# Patient Record
Sex: FEMALE | Race: WHITE | NOT HISPANIC OR LATINO | Employment: OTHER | URBAN - NONMETROPOLITAN AREA
[De-identification: names, ages, dates, MRNs, and addresses within clinical notes are randomized per-mention and may not be internally consistent; named-entity substitution may affect disease eponyms.]

---

## 2018-06-05 ENCOUNTER — OFFICE VISIT CONVERTED (OUTPATIENT)
Dept: FAMILY MEDICINE CLINIC | Age: 68
End: 2018-06-05
Attending: FAMILY MEDICINE

## 2018-12-04 ENCOUNTER — OFFICE VISIT CONVERTED (OUTPATIENT)
Dept: FAMILY MEDICINE CLINIC | Age: 68
End: 2018-12-04
Attending: FAMILY MEDICINE

## 2019-01-14 ENCOUNTER — HOSPITAL ENCOUNTER (OUTPATIENT)
Dept: OTHER | Facility: HOSPITAL | Age: 69
Discharge: HOME | End: 2019-01-14
Attending: FAMILY MEDICINE

## 2019-01-25 ENCOUNTER — OFFICE VISIT CONVERTED (OUTPATIENT)
Dept: NEUROSURGERY | Facility: CLINIC | Age: 69
End: 2019-01-25
Attending: NEUROLOGICAL SURGERY

## 2019-02-13 ENCOUNTER — OFFICE VISIT (OUTPATIENT)
Dept: PAIN MEDICINE | Facility: CLINIC | Age: 69
End: 2019-02-13

## 2019-02-13 VITALS
RESPIRATION RATE: 15 BRPM | HEART RATE: 83 BPM | BODY MASS INDEX: 32.2 KG/M2 | DIASTOLIC BLOOD PRESSURE: 73 MMHG | HEIGHT: 62 IN | TEMPERATURE: 98.1 F | WEIGHT: 175 LBS | OXYGEN SATURATION: 96 % | SYSTOLIC BLOOD PRESSURE: 120 MMHG

## 2019-02-13 DIAGNOSIS — G89.29 OTHER CHRONIC PAIN: Primary | ICD-10-CM

## 2019-02-13 DIAGNOSIS — M48.061 SPINAL STENOSIS OF LUMBAR REGION, UNSPECIFIED WHETHER NEUROGENIC CLAUDICATION PRESENT: ICD-10-CM

## 2019-02-13 DIAGNOSIS — M51.36 DDD (DEGENERATIVE DISC DISEASE), LUMBAR: ICD-10-CM

## 2019-02-13 DIAGNOSIS — M54.16 LUMBAR RADICULOPATHY: ICD-10-CM

## 2019-02-13 PROBLEM — M51.369 DDD (DEGENERATIVE DISC DISEASE), LUMBAR: Status: ACTIVE | Noted: 2019-02-13

## 2019-02-13 LAB
POC AMPHETAMINES: NEGATIVE
POC BARBITURATES: NEGATIVE
POC BENZODIAZEPHINES: NEGATIVE
POC COCAINE: NEGATIVE
POC METHADONE: NEGATIVE
POC METHAMPHETAMINE SCREEN URINE: NEGATIVE
POC OPIATES: NEGATIVE
POC OXYCODONE: NEGATIVE
POC PHENCYCLIDINE: NEGATIVE
POC PROPOXYPHENE: NEGATIVE
POC THC: NEGATIVE
POC TRICYCLIC ANTIDEPRESSANTS: NEGATIVE

## 2019-02-13 PROCEDURE — 80305 DRUG TEST PRSMV DIR OPT OBS: CPT | Performed by: NURSE PRACTITIONER

## 2019-02-13 PROCEDURE — 99204 OFFICE O/P NEW MOD 45 MIN: CPT | Performed by: NURSE PRACTITIONER

## 2019-02-13 RX ORDER — INSULIN ASPART 100 [IU]/ML
INJECTION, SOLUTION INTRAVENOUS; SUBCUTANEOUS
COMMUNITY
Start: 2018-11-09 | End: 2021-09-14

## 2019-02-13 RX ORDER — ASPIRIN 81 MG/1
81 TABLET, CHEWABLE ORAL DAILY
COMMUNITY

## 2019-02-13 RX ORDER — ATENOLOL AND CHLORTHALIDONE TABLET 50; 25 MG/1; MG/1
0.5 TABLET ORAL 2 TIMES DAILY
COMMUNITY
Start: 2019-01-04 | End: 2021-06-30

## 2019-02-13 RX ORDER — LEVOTHYROXINE SODIUM 0.1 MG/1
TABLET ORAL DAILY
COMMUNITY
Start: 2018-12-18 | End: 2021-06-14

## 2019-02-13 RX ORDER — LIRAGLUTIDE 6 MG/ML
1.2 INJECTION SUBCUTANEOUS DAILY
COMMUNITY
Start: 2019-01-22 | End: 2021-08-20

## 2019-02-13 RX ORDER — INSULIN GLARGINE 100 [IU]/ML
60 INJECTION, SOLUTION SUBCUTANEOUS DAILY
COMMUNITY
Start: 2018-11-19 | End: 2021-08-10

## 2019-02-13 RX ORDER — ATORVASTATIN CALCIUM 20 MG/1
TABLET, FILM COATED ORAL DAILY
COMMUNITY
Start: 2019-01-11 | End: 2021-06-28

## 2019-02-13 RX ORDER — LISINOPRIL 40 MG/1
TABLET ORAL DAILY
COMMUNITY
Start: 2018-12-18 | End: 2021-06-28

## 2019-02-13 NOTE — PROGRESS NOTES
"CHIEF COMPLAINT  New pt c/o hip and leg pain and numbness in right leg. She also has a burning sensation in that right leg and it does get weak after walking \"feels like it is not there.\" Pain is in both hips, worse in the right hip. Sitting down relieves the pain. States this pain has gotten worse in the last two months but has had it off and on for a year. Since mara night she has been sleeping with a pillow between her knees.  She has seen a neuosurgeon 3-4 weeks ago who suggested pain management and she doesn't want to go the route of surgery. States it was Dr. Hernandez from [facility]. She has a second visit scheduled. States she had epidural injections years ago 10-15 years ago. She had it done at [facility] in [facility]. It was through pain management. No reports of medicine or other treatments there. She had 6 injections which helped until 6 years ago. She has had cervical injections as well because of left arm getting numb and no issues since then. No use of OTC pain medicines, only rarely tylenol. No reports of narcotics to treat this. She has tried Gabapentin but stopped due to the side effects and not effective in helping reduce pain. MRI 1-22-19.     Subjective   Kelsy Rider is a  RHD 69 y.o. female.   She presents to the office for evaluation of hip and leg pain. She was referred here by Dr. Zuñiga.  She lives with her .  She is retired from working at a factory. Does not smoke and never has. Denies any alcohol use. Denies any substance use. Family history is positive for alcoholism(brother). Family history is negative for back and leg problems.    Complains of pain in her right buttock, right hip and right leg. Does have intermittent pain in the left buttock. Today her pain 7/10VAS. Describes her right leg is weak and has continuous burning. \"It feels like rubber and like it's not there.\" Fears of subjective weakness and falls. The pain radiates down her posterior and " "lateral right thigh. Pain increases with household chores, prolonged and walking and standing; pain decreases with laying, sitting, rest, pillow between knees for sleep. Used heat and ice with minimal temporary relief.  Takes Tylenol OTC occasionally with mild improvement. Was given a trial of gabapentin but stopped last week due to side effects(\"I felt like I was in a fog and stumbling over words-- which has resolved since stopping). Noted no relief except maybe made her sleep better. Denies any bowel or bladder incontinence. ADL's by self.    Her back pain started years ago. Had epidurals with Dr. Yeh over 10 years ago. Had ANÍBAL with positive response. Had instance of neck and LUE pain. Had BRITTANY with positive response. Had noticed intermittent instances of right hip and leg numbness over past 3 years. Would flare up and go away. However, since November 2018, has noticed her pain has become more constant, more severe and more debilitating. Saw Dr Hernandez (neurosurgery). Discussed potential for surgery. Decided to try pain management route. Followed up with Dr. Hernandez next week.     Her  is a patient of Dr. Selby and had lumbar RFL with significant relief.  No relief with PT and HEP.  Back Pain   This is a recurrent problem. The current episode started more than 1 year ago. The problem occurs constantly. The problem has been gradually worsening since onset. The pain is present in the gluteal. The quality of the pain is described as burning and shooting. Radiates to: RLE. The pain is at a severity of 7/10. The pain is moderate. The pain is the same all the time. The symptoms are aggravated by bending, position, standing and twisting. Stiffness is present all day. Associated symptoms include numbness and weakness. Pertinent negatives include no bladder incontinence, bowel incontinence, chest pain, fever or headaches. Risk factors include lack of exercise, menopause and sedentary lifestyle. She has tried " heat, ice, chiropractic manipulation, home exercises, bed rest, NSAIDs and muscle relaxant (gabapentin; previous lumbar epidurals provided significant long-term relief) for the symptoms. The treatment provided mild relief.      PEG Assessment   What number best describes your pain on average in the past week?9  What number best describes how, during the past week, pain has interfered with your enjoyment of life?8  What number best describes how, during the past week, pain has interfered with your general activity?  9      Current Outpatient Medications:   •  aspirin 81 MG chewable tablet, Chew 81 mg Daily., Disp: , Rfl:   •  atenolol-chlorthalidone (TENORETIC) 50-25 MG per tablet, 0.5 tablets 2 (Two) Times a Day., Disp: , Rfl:   •  atorvastatin (LIPITOR) 20 MG tablet, Daily., Disp: , Rfl:   •  LANTUS SOLOSTAR 100 UNIT/ML injection pen, 60 Units Daily., Disp: , Rfl:   •  levothyroxine (SYNTHROID, LEVOTHROID) 100 MCG tablet, Daily., Disp: , Rfl:   •  lisinopril (PRINIVIL,ZESTRIL) 40 MG tablet, Daily., Disp: , Rfl:   •  metFORMIN (GLUCOPHAGE) 850 MG tablet, 850 mg Daily., Disp: , Rfl:   •  NOVOLOG FLEXPEN 100 UNIT/ML solution pen-injector sc pen, 4units-12 units sliding scale, 6 units before meals, Disp: , Rfl:   •  VICTOZA 18 MG/3ML solution pen-injector injection, 1.2 Units Daily., Disp: , Rfl:     The following portions of the patient's history were reviewed and updated as appropriate: allergies, current medications, past family history, past medical history, past social history, past surgical history and problem list.      REVIEW OF PERTINENT MEDICAL DATA    BRITTANI [id number]-- Prescription for gabapentin 300 mg #90 from Dr. Amato on 1-8-19. Prescription for Hydrocodone 5/325 #20 from Petrona Hernandez on 5-14-18. One pharmacy and 2 providers.    PIÑA Inventory--2    Patient had 3 BRITTANY performed by Dr Steward on 10-26-17, 9-26-17.  Had lumbar epidural steroid injections with Dr. Yeh greater than 10 year  "ago.        Review of Systems   Constitutional: Positive for activity change. Negative for chills and fever.   Respiratory: Positive for apnea (states was on a machine for a long time but stopped using it due to having difficulty with the company.). Negative for cough, shortness of breath and wheezing.    Cardiovascular: Negative for chest pain.   Gastrointestinal: Positive for constipation. Negative for bowel incontinence.   Genitourinary: Negative for bladder incontinence and difficulty urinating.   Musculoskeletal: Positive for arthralgias and back pain.   Neurological: Positive for weakness and numbness. Negative for dizziness, light-headedness and headaches.   Psychiatric/Behavioral: Negative for agitation, confusion, hallucinations, sleep disturbance and suicidal ideas. The patient is not nervous/anxious.      Vitals:    02/13/19 1001   BP: 120/73   Pulse: 83   Resp: 15   Temp: 98.1 °F (36.7 °C)   SpO2: 96%   Weight: 79.4 kg (175 lb)   Height: 157.5 cm (62\")   PainSc:   7   PainLoc: Back  Comment: and leg. pt states when walking     Objective   Physical Exam   Constitutional: She is oriented to person, place, and time. Vital signs are normal. She appears well-developed and well-nourished. She is cooperative.   HENT:   Head: Normocephalic and atraumatic.   Nose: Nose normal.   Eyes: Conjunctivae, EOM and lids are normal. Pupils are equal, round, and reactive to light.   Neck: Trachea normal. Neck supple.   Cardiovascular: Normal rate, regular rhythm and normal heart sounds.   Pulmonary/Chest: Effort normal and breath sounds normal. No respiratory distress.   Abdominal: Normal appearance.   Musculoskeletal:        Lumbar back: She exhibits decreased range of motion and tenderness. She exhibits no bony tenderness and no spasm.   No si joint tenderness  No lumbar facet tenderness    Minimal right piriformis tenderness    + SLR on right, negative on left  Negative NATHAN bilaterally  + femoral stretch on right, " negative on left   Neurological: She is alert and oriented to person, place, and time. She has normal strength. Gait (RLE) abnormal.   Reflex Scores:       Patellar reflexes are 1+ on the right side and 2+ on the left side.       Achilles reflexes are 1+ on the right side and 1+ on the left side.  Skin: Skin is warm, dry and intact.   Psychiatric: She has a normal mood and affect. Her speech is normal and behavior is normal. Judgment and thought content normal. Cognition and memory are normal.   Nursing note and vitals reviewed.    Assessment/Plan   Kelsy was seen today for back pain and extremity pain.    Diagnoses and all orders for this visit:    Other chronic pain  -     Urine Drug Screen Confirmation - Urine, Clean Catch; Future  -     POC Urine Drug Screen, Triage    Spinal stenosis of lumbar region, unspecified whether neurogenic claudication present  -     Case Request  -     Urine Drug Screen Confirmation - Urine, Clean Catch; Future  -     POC Urine Drug Screen, Triage    DDD (degenerative disc disease), lumbar  -     Case Request  -     Urine Drug Screen Confirmation - Urine, Clean Catch; Future  -     POC Urine Drug Screen, Triage    Lumbar radiculopathy  -     Case Request  -     Urine Drug Screen Confirmation - Urine, Clean Catch; Future  -     POC Urine Drug Screen, Triage      --- Routine new patient initial UDS in office today.  The specimen was viewed and the immunoassay result reviewed and is NEGATIVE(APPROPRIATE).  This specimen will be sent to [facility] for confirmation.     -- TF ANÍBAL right L3 and L5 x2, 4 weeks apart. No blood thinners. Reviewed the procedure at length with the patient.  Included in the review was expectations, complications, risk and benefits.The procedure was described in detail and the risks, benefits and alternatives were discussed with the patient (including but not limited to: bleeding, infection, nerve damage, worsening of pain, inability to perform  injection, paralysis, seizures, and death) who agreed to proceed.  Discussed the potential for sedation if warranted/wanted.  The procedure will plan to be performed at [facility] with fluoroscopic guidance(unless ultrasound is indicated). Questions were answered and in a way the patient could understand.  Patient verbalized understanding and wishes to proceed.  This intervention will be ordered.  --- Follow-up after procedure or sooner if needed     BRITTANI REPORT      BRITTANI report has been reviewed and scanned into the patient's chart.    As the clinician, I personally reviewed the BRITTANI from 2-11-19 while the patient was in the office today.            EMR Dragon/Transcription disclaimer:   Much of this encounter note is an electronic transcription/translation of spoken language to printed text. The electronic translation of spoken language may permit erroneous, or at times, nonsensical words or phrases to be inadvertently transcribed; Although I have reviewed the note for such errors, some may still exist.

## 2019-02-18 ENCOUNTER — RESULTS ENCOUNTER (OUTPATIENT)
Dept: PAIN MEDICINE | Facility: CLINIC | Age: 69
End: 2019-02-18

## 2019-02-18 DIAGNOSIS — G89.29 OTHER CHRONIC PAIN: ICD-10-CM

## 2019-02-18 DIAGNOSIS — M51.36 DDD (DEGENERATIVE DISC DISEASE), LUMBAR: ICD-10-CM

## 2019-02-18 DIAGNOSIS — M48.061 SPINAL STENOSIS OF LUMBAR REGION, UNSPECIFIED WHETHER NEUROGENIC CLAUDICATION PRESENT: ICD-10-CM

## 2019-02-18 DIAGNOSIS — M54.16 LUMBAR RADICULOPATHY: ICD-10-CM

## 2019-02-27 ENCOUNTER — DOCUMENTATION (OUTPATIENT)
Dept: PAIN MEDICINE | Facility: CLINIC | Age: 69
End: 2019-02-27

## 2019-02-27 ENCOUNTER — OUTSIDE FACILITY SERVICE (OUTPATIENT)
Dept: PAIN MEDICINE | Facility: CLINIC | Age: 69
End: 2019-02-27

## 2019-02-27 PROCEDURE — 64484 NJX AA&/STRD TFRM EPI L/S EA: CPT | Performed by: PAIN MEDICINE

## 2019-02-27 PROCEDURE — 64483 NJX AA&/STRD TFRM EPI L/S 1: CPT | Performed by: PAIN MEDICINE

## 2019-02-27 NOTE — PROGRESS NOTES
Lumbar Transforaminal Epidural Steroid Injection (two levels)  [facility]      PREOPERATIVE DIAGNOSIS:  Chronic low back pain, Lumbar Degenerative Disc Disease and Lumbar Spinal Stenosis unspecified regarding Neurogenic Claudication    POSTOPERATIVE DIAGNOSIS:  Same as preop diagnosis    PROCEDURE:    1. CPT 65265 --  Diagnostic & Therapeutic Transforaminal Epidural Steroid Injection at the L3 level, on the right   2. CPT 20946 --  Diagnostic & Therapeutic Transforaminal Epidural Steroid Injection at the L5 level, on the right     PRE-PROCEDURE DISCUSSION WITH PATIENT:    Risks and complications were discussed with the patient prior to starting the procedure and informed consent was obtained.  We discussed various topics including but not limited to bleeding, infection, injury, nerve injury, paralysis, coma, death, postprocedural painful flare-up, postprocedural site soreness, and a lack of pain relief.  We discussed the diagnostic aspect of transforaminal epidural / selective nerve root blockade.    SURGEON:  Lady Selby MD    REASON FOR PROCEDURE:    Diagnostic injection at this level is needed, Degenerative changes are noted in the area. and Stenotic area is noted, and is likely contributing to this chronic &/or recurrent pain.     SEDATION:  Patient declined administration of moderate sedation    ANESTHETIC:  Marcaine 0.25%  STEROID:  Methylprednisolone (DEPO MEDROL) 80mg/ml    DESCRIPTON OF PROCEDURE:  After obtaining informed consent, an I.V. was not started in the preoperative area. The patient taken to the operating room and was placed in the prone position with a pillow under the abdomen.  All pressure points were well padded.  EKG, blood pressure, and pulse oximeter were monitored.  The lumbar area was prepped with Chloraprep and draped in a sterile fashion. Under fluoroscopic guidance in an oblique dimension on the above mentioned side, the transverse process of the first  aforementioned vertebra at the junction of the body at 6 o'clock position was identified. Skin and subcutaneous tissue was anesthetized with 1% lidocaine. A 22-gauge spinal needle was introduced under fluoroscopic guidance at the above junction into the foramen without parasthesias and into the epidural space. After confirming the position of the needle with PA, lateral, and oblique fluoroscopic views, aspiration was checked and was clear of blood or CSF.  Next, 1 mL of Omnipaque was injected. After seeing adequate spread on the corresponding nerve root, a total volume 2mL of injectate containing local anesthetic as above and half of the above mentioned corticosteroid was injected into the epidural space.  The needle was removed intact.      Next, in similar fashion, the second level mentioned above was addressed and a similar amount of injectate was delivered after similar imaging was achieved.      Vital signs were stable throughout.          ESTIMATED BLOOD LOSS:  <5 mL  SPECIMENS:  none    COMPLICATIONS:   No complications were noted. and There was no indication of vascular uptake on live injection of contrast dye.    TOLERANCE & DISCHARGE CONDITION:    The patient tolerated the procedure well.  The patient was transported to the recovery area without difficulties.  The patient was discharged to home under the care of family in stable and satisfactory condition.    PLAN OF CARE:  1. The patient was given our standard instruction sheet.  2. The patient will Repeat injection 2-4 wks.  3. The patient will resume all medications as per the medication reconciliation sheet.

## 2019-05-08 ENCOUNTER — DOCUMENTATION (OUTPATIENT)
Dept: PAIN MEDICINE | Facility: CLINIC | Age: 69
End: 2019-05-08

## 2019-05-08 ENCOUNTER — OUTSIDE FACILITY SERVICE (OUTPATIENT)
Dept: PAIN MEDICINE | Facility: CLINIC | Age: 69
End: 2019-05-08

## 2019-05-08 PROCEDURE — 64484 NJX AA&/STRD TFRM EPI L/S EA: CPT | Performed by: PAIN MEDICINE

## 2019-05-08 PROCEDURE — 64483 NJX AA&/STRD TFRM EPI L/S 1: CPT | Performed by: PAIN MEDICINE

## 2019-05-08 NOTE — PROGRESS NOTES
Lumbar Transforaminal Epidural Steroid Injection (two levels)  [facility]      PREOPERATIVE DIAGNOSIS:  Chronic low back pain and right Lumbar Radiculopathy    POSTOPERATIVE DIAGNOSIS:  Same as preop diagnosis    PROCEDURE:    1. CPT 54233 --  Diagnostic & Therapeutic Transforaminal Epidural Steroid Injection at the L3 level, on the right   2. CPT 90619 --  Diagnostic & Therapeutic Transforaminal Epidural Steroid Injection at the L5 level, on the right     PRE-PROCEDURE DISCUSSION WITH PATIENT:    Risks and complications were discussed with the patient prior to starting the procedure and informed consent was obtained.  We discussed various topics including but not limited to bleeding, infection, injury, nerve injury, paralysis, coma, death, postprocedural painful flare-up, postprocedural site soreness, and a lack of pain relief.  We discussed the diagnostic aspect of transforaminal epidural / selective nerve root blockade.    SURGEON:  Lady Selby MD    REASON FOR PROCEDURE:    Diagnostic injection at this level is needed and Degenerative changes are noted in the area.    SEDATION:  Patient declined administration of moderate sedation    ANESTHETIC:  Marcaine 0.25%  STEROID:  Methylprednisolone (DEPO MEDROL) 80mg/ml    DESCRIPTON OF PROCEDURE:  After obtaining informed consent, an I.V. was not started in the preoperative area. The patient taken to the operating room and was placed in the prone position with a pillow under the abdomen.  All pressure points were well padded.  EKG, blood pressure, and pulse oximeter were monitored.  The lumbar area was prepped with Chloraprep and draped in a sterile fashion. Under fluoroscopic guidance in an oblique dimension on the above mentioned side, the transverse process of the first aforementioned vertebra at the junction of the body at 6 o'clock position was identified. Skin and subcutaneous tissue was anesthetized with 1% lidocaine. A 22-gauge spinal  needle was introduced under fluoroscopic guidance at the above junction into the foramen without parasthesias and into the epidural space. After confirming the position of the needle with PA, lateral, and oblique fluoroscopic views, aspiration was checked and was clear of blood or CSF.  Next, 1 mL of Omnipaque was injected. After seeing adequate spread on the corresponding nerve root, a total volume 2mL of injectate containing local anesthetic as above and half of the above mentioned corticosteroid was injected into the epidural space.  The needle was removed intact.      Next, in similar fashion, the second level mentioned above was addressed and a similar amount of injectate was delivered after similar imaging was achieved.      Vital signs were stable throughout.          ESTIMATED BLOOD LOSS:  <5 mL  SPECIMENS:  none    COMPLICATIONS:   No complications were noted. and There was no indication of vascular uptake on live injection of contrast dye.    TOLERANCE & DISCHARGE CONDITION:    The patient tolerated the procedure well.  The patient was transported to the recovery area without difficulties.  The patient was discharged to home under the care of family in stable and satisfactory condition.    PLAN OF CARE:  1. The patient was given our standard instruction sheet.  2. The patient will Return to clinic 4-6 wks.  3. The patient will resume all medications as per the medication reconciliation sheet.

## 2019-06-13 ENCOUNTER — OFFICE VISIT CONVERTED (OUTPATIENT)
Dept: FAMILY MEDICINE CLINIC | Age: 69
End: 2019-06-13
Attending: FAMILY MEDICINE

## 2019-06-13 ENCOUNTER — HOSPITAL ENCOUNTER (OUTPATIENT)
Dept: OTHER | Facility: HOSPITAL | Age: 69
Discharge: HOME | End: 2019-06-13
Attending: FAMILY MEDICINE

## 2019-06-13 LAB
ALBUMIN SERPL-MCNC: 4.5 G/DL (ref 3.5–5)
ALBUMIN/GLOB SERPL: 1.4 {RATIO} (ref 1.4–2.6)
ALP SERPL-CCNC: 101 U/L (ref 43–160)
ALT SERPL-CCNC: 23 U/L (ref 10–40)
ANION GAP SERPL CALC-SCNC: 20 MMOL/L (ref 8–19)
AST SERPL-CCNC: 21 U/L (ref 15–50)
BILIRUB SERPL-MCNC: 0.54 MG/DL (ref 0.2–1.3)
BUN SERPL-MCNC: 30 MG/DL (ref 5–25)
BUN/CREAT SERPL: 25 {RATIO} (ref 6–20)
CALCIUM SERPL-MCNC: 9.8 MG/DL (ref 8.7–10.4)
CHLORIDE SERPL-SCNC: 98 MMOL/L (ref 99–111)
CHOLEST SERPL-MCNC: 166 MG/DL (ref 107–200)
CHOLEST/HDLC SERPL: 4.2 {RATIO} (ref 3–6)
CONV CO2: 25 MMOL/L (ref 22–32)
CONV CREATININE URINE, RANDOM: 228.8 MG/DL (ref 10–300)
CONV MICROALBUM.,U,RANDOM: 19.7 MG/L (ref 0–20)
CONV TOTAL PROTEIN: 7.8 G/DL (ref 6.3–8.2)
CREAT UR-MCNC: 1.21 MG/DL (ref 0.5–0.9)
EST. AVERAGE GLUCOSE BLD GHB EST-MCNC: 123 MG/DL
GFR SERPLBLD BASED ON 1.73 SQ M-ARVRAT: 46 ML/MIN/{1.73_M2}
GLOBULIN UR ELPH-MCNC: 3.3 G/DL (ref 2–3.5)
GLUCOSE SERPL-MCNC: 181 MG/DL (ref 65–99)
HBA1C MFR BLD: 5.9 % (ref 3.5–5.7)
HDLC SERPL-MCNC: 40 MG/DL (ref 40–60)
LDLC SERPL CALC-MCNC: 94 MG/DL (ref 70–100)
MICROALBUMIN/CREAT UR: 8.6 MG/G{CRE} (ref 0–35)
OSMOLALITY SERPL CALC.SUM OF ELEC: 297 MOSM/KG (ref 273–304)
POTASSIUM SERPL-SCNC: 4.5 MMOL/L (ref 3.5–5.3)
SODIUM SERPL-SCNC: 138 MMOL/L (ref 135–147)
TRIGL SERPL-MCNC: 162 MG/DL (ref 40–150)
VLDLC SERPL-MCNC: 32 MG/DL (ref 5–37)

## 2019-07-12 ENCOUNTER — HOSPITAL ENCOUNTER (OUTPATIENT)
Dept: OTHER | Facility: HOSPITAL | Age: 69
Discharge: HOME | End: 2019-07-12
Attending: FAMILY MEDICINE

## 2019-07-12 LAB
ANION GAP SERPL CALC-SCNC: 20 MMOL/L (ref 8–19)
BUN SERPL-MCNC: 19 MG/DL (ref 5–25)
BUN/CREAT SERPL: 22 {RATIO} (ref 6–20)
CALCIUM SERPL-MCNC: 9.4 MG/DL (ref 8.7–10.4)
CHLORIDE SERPL-SCNC: 101 MMOL/L (ref 99–111)
CONV CO2: 24 MMOL/L (ref 22–32)
CREAT UR-MCNC: 0.87 MG/DL (ref 0.5–0.9)
GFR SERPLBLD BASED ON 1.73 SQ M-ARVRAT: >60 ML/MIN/{1.73_M2}
GLUCOSE SERPL-MCNC: 158 MG/DL (ref 65–99)
OSMOLALITY SERPL CALC.SUM OF ELEC: 298 MOSM/KG (ref 273–304)
POTASSIUM SERPL-SCNC: 4 MMOL/L (ref 3.5–5.3)
SODIUM SERPL-SCNC: 141 MMOL/L (ref 135–147)

## 2019-07-15 ENCOUNTER — HOSPITAL ENCOUNTER (OUTPATIENT)
Dept: OTHER | Facility: HOSPITAL | Age: 69
Discharge: HOME | End: 2019-07-15
Attending: FAMILY MEDICINE

## 2019-07-15 ENCOUNTER — OFFICE VISIT CONVERTED (OUTPATIENT)
Dept: FAMILY MEDICINE CLINIC | Age: 69
End: 2019-07-15
Attending: FAMILY MEDICINE

## 2019-07-15 LAB
APPEARANCE UR: CLEAR
BACTERIA UR QL AUTO: ABNORMAL
BILIRUB UR QL: NEGATIVE
CASTS URNS QL MICRO: ABNORMAL /[LPF]
COLOR UR: YELLOW
CONV LEUKOCYTE ESTERASE: NEGATIVE
CONV UROBILINOGEN IN URINE BY AUTOMATED TEST STRIP: 0.2 {EHRLICHU}/DL (ref 0.1–1)
EPI CELLS #/AREA URNS HPF: ABNORMAL /[HPF]
GLUCOSE 24H UR-MCNC: NEGATIVE MG/DL
HGB UR QL STRIP: ABNORMAL
KETONES UR QL STRIP: NEGATIVE MG/DL
MUCOUS THREADS URNS QL MICRO: ABNORMAL
NITRITE UR-MCNC: NEGATIVE MG/ML
PH UR STRIP.AUTO: 6 [PH] (ref 5–8)
PROT UR-MCNC: NEGATIVE MG/DL
RBC # BLD AUTO: ABNORMAL /[HPF]
SP GR UR STRIP: 1.01 (ref 1–1.03)
SPECIMEN SOURCE: ABNORMAL
UNIDENT CRYS URNS QL MICRO: ABNORMAL /[HPF]
WBC #/AREA URNS HPF: ABNORMAL /[HPF]

## 2019-10-14 ENCOUNTER — OFFICE VISIT CONVERTED (OUTPATIENT)
Dept: FAMILY MEDICINE CLINIC | Age: 69
End: 2019-10-14
Attending: FAMILY MEDICINE

## 2019-10-14 ENCOUNTER — HOSPITAL ENCOUNTER (OUTPATIENT)
Dept: OTHER | Facility: HOSPITAL | Age: 69
Discharge: HOME | End: 2019-10-14
Attending: FAMILY MEDICINE

## 2019-10-14 LAB
ALBUMIN SERPL-MCNC: 4.5 G/DL (ref 3.5–5)
ALBUMIN/GLOB SERPL: 1.5 {RATIO} (ref 1.4–2.6)
ALP SERPL-CCNC: 102 U/L (ref 43–160)
ALT SERPL-CCNC: 22 U/L (ref 10–40)
ANION GAP SERPL CALC-SCNC: 18 MMOL/L (ref 8–19)
AST SERPL-CCNC: 22 U/L (ref 15–50)
BASOPHILS # BLD MANUAL: 0.04 10*3/UL (ref 0–0.2)
BASOPHILS NFR BLD MANUAL: 0.6 % (ref 0–3)
BILIRUB SERPL-MCNC: 0.4 MG/DL (ref 0.2–1.3)
BUN SERPL-MCNC: 17 MG/DL (ref 5–25)
BUN/CREAT SERPL: 20 {RATIO} (ref 6–20)
CALCIUM SERPL-MCNC: 9.8 MG/DL (ref 8.7–10.4)
CHLORIDE SERPL-SCNC: 101 MMOL/L (ref 99–111)
CHOLEST SERPL-MCNC: 149 MG/DL (ref 107–200)
CHOLEST/HDLC SERPL: 4 {RATIO} (ref 3–6)
CONV CO2: 26 MMOL/L (ref 22–32)
CONV TOTAL PROTEIN: 7.6 G/DL (ref 6.3–8.2)
CREAT UR-MCNC: 0.83 MG/DL (ref 0.5–0.9)
DEPRECATED RDW RBC AUTO: 45.3 FL
EOSINOPHIL # BLD MANUAL: 0.2 10*3/UL (ref 0–0.7)
EOSINOPHIL NFR BLD MANUAL: 3.1 % (ref 0–7)
ERYTHROCYTE [DISTWIDTH] IN BLOOD BY AUTOMATED COUNT: 13.4 % (ref 11.5–14.5)
EST. AVERAGE GLUCOSE BLD GHB EST-MCNC: 128 MG/DL
GFR SERPLBLD BASED ON 1.73 SQ M-ARVRAT: >60 ML/MIN/{1.73_M2}
GLOBULIN UR ELPH-MCNC: 3.1 G/DL (ref 2–3.5)
GLUCOSE SERPL-MCNC: 78 MG/DL (ref 65–99)
GRANS (ABSOLUTE): 3.56 10*3/UL (ref 2–8)
GRANS: 54.5 % (ref 30–85)
HBA1C MFR BLD: 13.8 G/DL (ref 12–16)
HBA1C MFR BLD: 6.1 % (ref 3.5–5.7)
HCT VFR BLD AUTO: 43.2 % (ref 37–47)
HDLC SERPL-MCNC: 37 MG/DL (ref 40–60)
IMM GRANULOCYTES # BLD: 0.01 10*3/UL (ref 0–0.54)
IMM GRANULOCYTES NFR BLD: 0.2 % (ref 0–0.43)
LDLC SERPL CALC-MCNC: 75 MG/DL (ref 70–100)
LYMPHOCYTES # BLD MANUAL: 2.11 10*3/UL (ref 1–5)
LYMPHOCYTES NFR BLD MANUAL: 9.2 % (ref 3–10)
MCH RBC QN AUTO: 29.6 PG (ref 27–31)
MCHC RBC AUTO-ENTMCNC: 31.9 G/DL (ref 33–37)
MCV RBC AUTO: 92.7 FL (ref 81–99)
MONOCYTES # BLD AUTO: 0.6 10*3/UL (ref 0.2–1.2)
OSMOLALITY SERPL CALC.SUM OF ELEC: 292 MOSM/KG (ref 273–304)
PLATELET # BLD AUTO: 276 10*3/UL (ref 130–400)
PMV BLD AUTO: 10.7 FL (ref 7.4–10.4)
POTASSIUM SERPL-SCNC: 4.1 MMOL/L (ref 3.5–5.3)
RBC # BLD AUTO: 4.66 10*6/UL (ref 4.2–5.4)
SODIUM SERPL-SCNC: 141 MMOL/L (ref 135–147)
TRIGL SERPL-MCNC: 186 MG/DL (ref 40–150)
TSH SERPL-ACNC: 0.56 M[IU]/L (ref 0.27–4.2)
VARIANT LYMPHS NFR BLD MANUAL: 32.4 % (ref 20–45)
VLDLC SERPL-MCNC: 37 MG/DL (ref 5–37)
WBC # BLD AUTO: 6.52 10*3/UL (ref 4.8–10.8)

## 2019-12-03 ENCOUNTER — OFFICE VISIT CONVERTED (OUTPATIENT)
Dept: FAMILY MEDICINE CLINIC | Age: 69
End: 2019-12-03
Attending: FAMILY MEDICINE

## 2019-12-13 ENCOUNTER — OFFICE VISIT CONVERTED (OUTPATIENT)
Dept: FAMILY MEDICINE CLINIC | Age: 69
End: 2019-12-13
Attending: FAMILY MEDICINE

## 2020-01-03 ENCOUNTER — HOSPITAL ENCOUNTER (OUTPATIENT)
Dept: OTHER | Facility: HOSPITAL | Age: 70
Discharge: HOME | End: 2020-01-03
Attending: FAMILY MEDICINE

## 2020-01-15 ENCOUNTER — HOSPITAL ENCOUNTER (OUTPATIENT)
Dept: OTHER | Facility: HOSPITAL | Age: 70
Discharge: HOME | End: 2020-01-15
Attending: FAMILY MEDICINE

## 2020-01-15 LAB
ALBUMIN SERPL-MCNC: 4.2 G/DL (ref 3.5–5)
ALBUMIN/GLOB SERPL: 1.4 {RATIO} (ref 1.4–2.6)
ALP SERPL-CCNC: 85 U/L (ref 43–160)
ALT SERPL-CCNC: 15 U/L (ref 10–40)
ANION GAP SERPL CALC-SCNC: 18 MMOL/L (ref 8–19)
AST SERPL-CCNC: 16 U/L (ref 15–50)
BILIRUB SERPL-MCNC: 0.38 MG/DL (ref 0.2–1.3)
BUN SERPL-MCNC: 25 MG/DL (ref 5–25)
BUN/CREAT SERPL: 26 {RATIO} (ref 6–20)
CALCIUM SERPL-MCNC: 9.5 MG/DL (ref 8.7–10.4)
CHLORIDE SERPL-SCNC: 102 MMOL/L (ref 99–111)
CHOLEST SERPL-MCNC: 160 MG/DL (ref 107–200)
CHOLEST/HDLC SERPL: 5.2 {RATIO} (ref 3–6)
CONV CO2: 26 MMOL/L (ref 22–32)
CONV TOTAL PROTEIN: 7.2 G/DL (ref 6.3–8.2)
CREAT UR-MCNC: 0.98 MG/DL (ref 0.5–0.9)
EST. AVERAGE GLUCOSE BLD GHB EST-MCNC: 120 MG/DL
GFR SERPLBLD BASED ON 1.73 SQ M-ARVRAT: 59 ML/MIN/{1.73_M2}
GLOBULIN UR ELPH-MCNC: 3 G/DL (ref 2–3.5)
GLUCOSE SERPL-MCNC: 129 MG/DL (ref 65–99)
HBA1C MFR BLD: 5.8 % (ref 3.5–5.7)
HDLC SERPL-MCNC: 31 MG/DL (ref 40–60)
LDLC SERPL CALC-MCNC: 80 MG/DL (ref 70–100)
OSMOLALITY SERPL CALC.SUM OF ELEC: 298 MOSM/KG (ref 273–304)
POTASSIUM SERPL-SCNC: 4.5 MMOL/L (ref 3.5–5.3)
SODIUM SERPL-SCNC: 141 MMOL/L (ref 135–147)
TRIGL SERPL-MCNC: 247 MG/DL (ref 40–150)
VLDLC SERPL-MCNC: 49 MG/DL (ref 5–37)

## 2020-03-23 ENCOUNTER — HOSPITAL ENCOUNTER (OUTPATIENT)
Dept: OTHER | Facility: HOSPITAL | Age: 70
Discharge: HOME | End: 2020-03-23
Attending: FAMILY MEDICINE

## 2020-03-23 ENCOUNTER — OFFICE VISIT CONVERTED (OUTPATIENT)
Dept: FAMILY MEDICINE CLINIC | Age: 70
End: 2020-03-23
Attending: FAMILY MEDICINE

## 2020-03-25 LAB — BACTERIA SPEC AEROBE CULT: NORMAL

## 2020-04-13 ENCOUNTER — OFFICE VISIT CONVERTED (OUTPATIENT)
Dept: FAMILY MEDICINE CLINIC | Age: 70
End: 2020-04-13
Attending: FAMILY MEDICINE

## 2020-06-23 ENCOUNTER — HOSPITAL ENCOUNTER (OUTPATIENT)
Dept: OTHER | Facility: HOSPITAL | Age: 70
Discharge: HOME | End: 2020-06-23
Attending: FAMILY MEDICINE

## 2020-06-23 ENCOUNTER — OFFICE VISIT CONVERTED (OUTPATIENT)
Dept: FAMILY MEDICINE CLINIC | Age: 70
End: 2020-06-23
Attending: FAMILY MEDICINE

## 2020-06-23 LAB
ALBUMIN SERPL-MCNC: 4.3 G/DL (ref 3.5–5)
ALBUMIN/GLOB SERPL: 1.3 {RATIO} (ref 1.4–2.6)
ALP SERPL-CCNC: 97 U/L (ref 43–160)
ALT SERPL-CCNC: 22 U/L (ref 10–40)
ANION GAP SERPL CALC-SCNC: 19 MMOL/L (ref 8–19)
AST SERPL-CCNC: 22 U/L (ref 15–50)
BILIRUB SERPL-MCNC: 0.5 MG/DL (ref 0.2–1.3)
BUN SERPL-MCNC: 28 MG/DL (ref 5–25)
BUN/CREAT SERPL: 28 {RATIO} (ref 6–20)
CALCIUM SERPL-MCNC: 9.8 MG/DL (ref 8.7–10.4)
CHLORIDE SERPL-SCNC: 100 MMOL/L (ref 99–111)
CHOLEST SERPL-MCNC: 87 MG/DL (ref 107–200)
CHOLEST/HDLC SERPL: 2.2 {RATIO} (ref 3–6)
CONV CO2: 24 MMOL/L (ref 22–32)
CONV CREATININE URINE, RANDOM: 89.8 MG/DL (ref 10–300)
CONV MICROALBUM.,U,RANDOM: <12 MG/L (ref 0–20)
CONV TOTAL PROTEIN: 7.5 G/DL (ref 6.3–8.2)
CREAT UR-MCNC: 0.99 MG/DL (ref 0.5–0.9)
EST. AVERAGE GLUCOSE BLD GHB EST-MCNC: 146 MG/DL
GFR SERPLBLD BASED ON 1.73 SQ M-ARVRAT: 58 ML/MIN/{1.73_M2}
GLOBULIN UR ELPH-MCNC: 3.2 G/DL (ref 2–3.5)
GLUCOSE SERPL-MCNC: 140 MG/DL (ref 65–99)
HBA1C MFR BLD: 6.7 % (ref 3.5–5.7)
HDLC SERPL-MCNC: 40 MG/DL (ref 40–60)
LDLC SERPL CALC-MCNC: 12 MG/DL (ref 70–100)
MICROALBUMIN/CREAT UR: 13.4 MG/G{CRE} (ref 0–35)
OSMOLALITY SERPL CALC.SUM OF ELEC: 296 MOSM/KG (ref 273–304)
POTASSIUM SERPL-SCNC: 4.4 MMOL/L (ref 3.5–5.3)
SODIUM SERPL-SCNC: 139 MMOL/L (ref 135–147)
TRIGL SERPL-MCNC: 177 MG/DL (ref 40–150)
VLDLC SERPL-MCNC: 35 MG/DL (ref 5–37)

## 2020-08-04 ENCOUNTER — OFFICE VISIT CONVERTED (OUTPATIENT)
Dept: NEUROSURGERY | Facility: CLINIC | Age: 70
End: 2020-08-04
Attending: NEUROLOGICAL SURGERY

## 2020-08-19 ENCOUNTER — HOSPITAL ENCOUNTER (OUTPATIENT)
Dept: OTHER | Facility: HOSPITAL | Age: 70
Discharge: HOME | End: 2020-08-19
Attending: NEUROLOGICAL SURGERY

## 2020-09-01 ENCOUNTER — OFFICE VISIT CONVERTED (OUTPATIENT)
Dept: NEUROSURGERY | Facility: CLINIC | Age: 70
End: 2020-09-01
Attending: NEUROLOGICAL SURGERY

## 2020-09-01 ENCOUNTER — CONVERSION ENCOUNTER (OUTPATIENT)
Dept: NEUROLOGY | Facility: CLINIC | Age: 70
End: 2020-09-01

## 2020-10-09 ENCOUNTER — HOSPITAL ENCOUNTER (OUTPATIENT)
Dept: PREADMISSION TESTING | Facility: HOSPITAL | Age: 70
Discharge: HOME | End: 2020-10-09
Attending: NEUROLOGICAL SURGERY

## 2020-10-09 LAB
ANION GAP SERPL CALC-SCNC: 15 MMOL/L (ref 8–19)
BUN SERPL-MCNC: 32 MG/DL (ref 5–25)
BUN/CREAT SERPL: 32 {RATIO} (ref 6–20)
CALCIUM SERPL-MCNC: 9.3 MG/DL (ref 8.7–10.4)
CHLORIDE SERPL-SCNC: 101 MMOL/L (ref 99–111)
CONV CO2: 24 MMOL/L (ref 22–32)
CREAT UR-MCNC: 0.99 MG/DL (ref 0.5–0.9)
GFR SERPLBLD BASED ON 1.73 SQ M-ARVRAT: 58 ML/MIN/{1.73_M2}
GLUCOSE SERPL-MCNC: 292 MG/DL (ref 65–99)
OSMOLALITY SERPL CALC.SUM OF ELEC: 300 MOSM/KG (ref 273–304)
POTASSIUM SERPL-SCNC: 3.8 MMOL/L (ref 3.5–5.3)
SODIUM SERPL-SCNC: 136 MMOL/L (ref 135–147)

## 2020-10-11 LAB — SARS-COV-2 RNA SPEC QL NAA+PROBE: NOT DETECTED

## 2020-10-14 ENCOUNTER — HOSPITAL ENCOUNTER (OUTPATIENT)
Dept: PERIOP | Facility: HOSPITAL | Age: 70
Setting detail: HOSPITAL OUTPATIENT SURGERY
Discharge: HOME | End: 2020-10-14
Attending: NEUROLOGICAL SURGERY

## 2020-10-14 LAB
GLUCOSE BLD-MCNC: 140 MG/DL (ref 65–99)
GLUCOSE BLD-MCNC: 176 MG/DL (ref 65–99)

## 2020-11-05 ENCOUNTER — OFFICE VISIT CONVERTED (OUTPATIENT)
Dept: NEUROSURGERY | Facility: CLINIC | Age: 70
End: 2020-11-05
Attending: PHYSICIAN ASSISTANT

## 2020-11-16 ENCOUNTER — HOSPITAL ENCOUNTER (OUTPATIENT)
Dept: OTHER | Facility: HOSPITAL | Age: 70
Discharge: HOME | End: 2020-11-16
Attending: FAMILY MEDICINE

## 2020-11-16 ENCOUNTER — OFFICE VISIT CONVERTED (OUTPATIENT)
Dept: FAMILY MEDICINE CLINIC | Age: 70
End: 2020-11-16
Attending: FAMILY MEDICINE

## 2020-11-16 LAB
25(OH)D3 SERPL-MCNC: 34.7 NG/ML (ref 30–100)
ALBUMIN SERPL-MCNC: 4.3 G/DL (ref 3.5–5)
ALBUMIN/GLOB SERPL: 1.5 {RATIO} (ref 1.4–2.6)
ALP SERPL-CCNC: 99 U/L (ref 43–160)
ALT SERPL-CCNC: 36 U/L (ref 10–40)
ANION GAP SERPL CALC-SCNC: 19 MMOL/L (ref 8–19)
AST SERPL-CCNC: 30 U/L (ref 15–50)
BILIRUB SERPL-MCNC: 0.39 MG/DL (ref 0.2–1.3)
BUN SERPL-MCNC: 21 MG/DL (ref 5–25)
BUN/CREAT SERPL: 21 {RATIO} (ref 6–20)
CALCIUM SERPL-MCNC: 9.3 MG/DL (ref 8.7–10.4)
CHLORIDE SERPL-SCNC: 101 MMOL/L (ref 99–111)
CHOLEST SERPL-MCNC: 69 MG/DL (ref 107–200)
CHOLEST/HDLC SERPL: 1.8 {RATIO} (ref 3–6)
CONV CO2: 25 MMOL/L (ref 22–32)
CONV TOTAL PROTEIN: 7.2 G/DL (ref 6.3–8.2)
CREAT UR-MCNC: 1.02 MG/DL (ref 0.5–0.9)
EST. AVERAGE GLUCOSE BLD GHB EST-MCNC: 160 MG/DL
GFR SERPLBLD BASED ON 1.73 SQ M-ARVRAT: 56 ML/MIN/{1.73_M2}
GLOBULIN UR ELPH-MCNC: 2.9 G/DL (ref 2–3.5)
GLUCOSE SERPL-MCNC: 200 MG/DL (ref 65–99)
HBA1C MFR BLD: 7.2 % (ref 3.5–5.7)
HDLC SERPL-MCNC: 39 MG/DL (ref 40–60)
LDLC SERPL CALC-MCNC: <10 MG/DL (ref 70–100)
OSMOLALITY SERPL CALC.SUM OF ELEC: 301 MOSM/KG (ref 273–304)
POTASSIUM SERPL-SCNC: 4.1 MMOL/L (ref 3.5–5.3)
SODIUM SERPL-SCNC: 141 MMOL/L (ref 135–147)
TRIGL SERPL-MCNC: 207 MG/DL (ref 40–150)
TSH SERPL-ACNC: 1.56 M[IU]/L (ref 0.27–4.2)
VLDLC SERPL-MCNC: 41 MG/DL (ref 5–37)

## 2020-12-07 ENCOUNTER — HOSPITAL ENCOUNTER (OUTPATIENT)
Dept: CARDIOLOGY | Facility: HOSPITAL | Age: 70
Discharge: HOME | End: 2020-12-07
Attending: FAMILY MEDICINE

## 2021-02-24 ENCOUNTER — HOSPITAL ENCOUNTER (OUTPATIENT)
Dept: VACCINE CLINIC | Facility: HOSPITAL | Age: 71
Discharge: HOME | End: 2021-02-24
Attending: INTERNAL MEDICINE

## 2021-03-26 ENCOUNTER — HOSPITAL ENCOUNTER (OUTPATIENT)
Dept: VACCINE CLINIC | Facility: HOSPITAL | Age: 71
Discharge: HOME | End: 2021-03-26
Attending: INTERNAL MEDICINE

## 2021-05-12 ENCOUNTER — OFFICE VISIT CONVERTED (OUTPATIENT)
Dept: FAMILY MEDICINE CLINIC | Age: 71
End: 2021-05-12
Attending: FAMILY MEDICINE

## 2021-05-12 ENCOUNTER — HOSPITAL ENCOUNTER (OUTPATIENT)
Dept: OTHER | Facility: HOSPITAL | Age: 71
Discharge: HOME | End: 2021-05-12
Attending: FAMILY MEDICINE

## 2021-05-12 LAB
ERYTHROCYTE [DISTWIDTH] IN BLOOD BY AUTOMATED COUNT: 13.8 % (ref 11.5–14.5)
HBA1C MFR BLD: 13.9 G/DL (ref 12–16)
HCT VFR BLD AUTO: 42.2 % (ref 37–47)
MCH RBC QN AUTO: 30 PG (ref 27–31)
MCHC RBC AUTO-ENTMCNC: 32.9 G/DL (ref 33–37)
MCV RBC AUTO: 91.1 FL (ref 81–99)
PLATELET # BLD AUTO: 210 10*3/UL (ref 130–400)
PMV BLD AUTO: 11.4 FL (ref 7.4–10.4)
RBC # BLD AUTO: 4.63 10*6/UL (ref 4.2–5.4)
TSH SERPL-ACNC: 3.05 M[IU]/L (ref 0.27–4.2)
WBC # BLD AUTO: 6.9 10*3/UL (ref 4.8–10.8)

## 2021-05-13 LAB
25(OH)D3 SERPL-MCNC: 34.3 NG/ML (ref 30–100)
ALBUMIN SERPL-MCNC: 4.5 G/DL (ref 3.5–5)
ALBUMIN/GLOB SERPL: 1.5 {RATIO} (ref 1.4–2.6)
ALP SERPL-CCNC: 75 U/L (ref 43–160)
ALT SERPL-CCNC: 22 U/L (ref 10–40)
ANION GAP SERPL CALC-SCNC: 16 MMOL/L (ref 8–19)
AST SERPL-CCNC: 22 U/L (ref 15–50)
BILIRUB SERPL-MCNC: 0.31 MG/DL (ref 0.2–1.3)
BUN SERPL-MCNC: 25 MG/DL (ref 5–25)
BUN/CREAT SERPL: 20 {RATIO} (ref 6–20)
CALCIUM SERPL-MCNC: 9.3 MG/DL (ref 8.7–10.4)
CHLORIDE SERPL-SCNC: 104 MMOL/L (ref 99–111)
CHOLEST SERPL-MCNC: 87 MG/DL (ref 107–200)
CHOLEST/HDLC SERPL: 2.1 {RATIO} (ref 3–6)
CONV CO2: 28 MMOL/L (ref 22–32)
CONV TOTAL PROTEIN: 7.6 G/DL (ref 6.3–8.2)
CREAT UR-MCNC: 1.25 MG/DL (ref 0.5–0.9)
EST. AVERAGE GLUCOSE BLD GHB EST-MCNC: 131 MG/DL
GFR SERPLBLD BASED ON 1.73 SQ M-ARVRAT: 43 ML/MIN/{1.73_M2}
GLOBULIN UR ELPH-MCNC: 3.1 G/DL (ref 2–3.5)
GLUCOSE SERPL-MCNC: 108 MG/DL (ref 65–99)
HBA1C MFR BLD: 6.2 % (ref 3.5–5.7)
HDLC SERPL-MCNC: 42 MG/DL (ref 40–60)
LDLC SERPL CALC-MCNC: 11 MG/DL (ref 70–100)
OSMOLALITY SERPL CALC.SUM OF ELEC: 301 MOSM/KG (ref 273–304)
POTASSIUM SERPL-SCNC: 4.5 MMOL/L (ref 3.5–5.3)
SODIUM SERPL-SCNC: 143 MMOL/L (ref 135–147)
TRIGL SERPL-MCNC: 169 MG/DL (ref 40–150)
VLDLC SERPL-MCNC: 34 MG/DL (ref 5–37)

## 2021-05-13 NOTE — PROGRESS NOTES
Progress Note      Patient Name: Kelsy Rider   Patient ID: [id number]   Sex: Female   YOB: 1950    Referring Provider: Jonnie Zuñiga MD    Visit Date: September 1, 2020    Provider: Maikol Nails MD   Location[facility]   Location Address: [address] [place] [address]  [id number]   Location Phone: [phone]          Chief Complaint  · Surgical History and Physical     Here to discuss MRI results.       History Of Present Illness     She has continued pain into the bilateral legs and feet. She has severe stenosis at L4-5 and L-3-4. She lateral recess stenosis at L5-S1 (? synovial cyst).       Past Medical History  Arthritis; Diabetes; Diabetes mellitus, insulin dependent (IDDM), controlled; Hyperlipemia; Hyperlipidemia; Hypertension; Hypertension, Benign Essential; Leg pain; Lumbago; Lumbar Spinal Stenosis; Synovial Cyst; Thyroid Disease         Past Surgical History  cardiac stents; Cholecystecomy; Gallbladder; Shoulder surgery         Medication List  alendronate 70 mg oral tablet; aspirin 81 mg oral tablet,delayed release (DR/EC); atenolol-chlorthalidone 100-25 mg oral tablet; atorvastatin 20 mg oral tablet; Calcium 600 + D(3) 600 mg calcium- 200 unit oral capsule; Co Q-10 100 mg oral capsule; famotidine 20 mg oral tablet; gabapentin 600 mg oral tablet; Lantus Solostar U-100 Insulin 100 unit/mL (3 mL) subcutaneous insulin pen; levothyroxine 100 mcg oral tablet; lisinopril 40 mg oral tablet; meloxicam 15 mg oral tablet; metformin 850 mg oral tablet; Novolog Flexpen U-100 Insulin 100 unit/mL subcutaneous insulin pen; Repatha Syringe 140 mg/mL subcutaneous syringe; Victoza 2-Osorio 0.6 mg/0.1 mL (18 mg/3 mL) subcutaneous pen injector         Allergy List  morphine; Plavix       Allergies Reconciled  Family Medical History  Heart Disease; Cancer, Unspecified; Diabetes; Family history of cancer; Family history of heart disease; Family history of diabetes  "mellitus         Social History  Alcohol (Never); Denies illicit substance abuse (Never); lives with spouse; ; Tobacco (Never)         Review of Systems  · Constitutional  o Admits  o : weight gain  o Denies  o : chills, excessive sweating, fatigue, fever, sycope/passing out, weight loss  · Eyes  o Denies  o : changes in vision, blurry vision, double vision  · HENT  o Denies  o : loss of hearing, ringing in the ears, ear aches, sore throat, nasal congestion, sinus pain, nose bleeds, seasonal allergies  · Cardiovascular  o Denies  o : blood clots, swollen legs, anemia, easy burising or bleeding, transfusions  · Respiratory  o Denies  o : shortness of breath, dry cough, productive cough, pneumonia, COPD  · Gastrointestinal  o Denies  o : difficulty swallowing, reflux  · Genitourinary  o Denies  o : incontinence  · Neurologic  o Denies  o : headache, seizure, stroke, tremor, loss of balance, falls, dizziness/vertigo, difficulty with sleep, numbness/tingling/paresthesia , difficulty with coordination, difficulty with dexterity, weakness  · Musculoskeletal  o Admits  o : pain radiating in leg, low back pain  o Denies  o : neck stiffness/pain, swollen lymph nodes, muscle aches, joint pain, weakness, spasms, sciatica, pain radiating in arm  · Endocrine  o Admits  o : diabetes, thyroid disorder  · Psychiatric  o Denies  o : anxiety, depression  · All Others Negative      Vitals  Date Time BP Position Site L\R Cuff Size HR RR TEMP (F) WT  HT  BMI kg/m2 BSA m2 O2 Sat        09/01/2020 10:34 AM        97.5 177lbs 3oz 5'  2\" 32.41 1.88           Physical Examination  · Constitutional  o Appearance  o : well-nourished, well developed, alert, in no acute distress  · Respiratory  o Respiratory Effort  o : breathing unlabored  · Cardiovascular  o Peripheral Vascular System  o :   § Extremities  § : no cyanosis  · Psychiatric  o Mood and Affect  o : mood normal, affect appropriate              Assessment  · Lumbar Spinal " Stenosis     724.02/M48.06  L3/4, L4/5 with neurogenic claudication she also has lateral recess stenosis at L5-S1 with possible synovial cyst  · Preoperative examination     V72.84/Z01.818      Plan  · Medications  o Medications have been Reconciled  o Transition of Care or Provider Policy  · Instructions  o We will plan to proceed with L3-4, L4-5 and L5-S1 minimally invasive laminectomy.   o ****Surgical Orders****  o Outpatient  o RISK AND BENEFITS:  o Possible risks/complications, benefits and alternatives to surgical or invasive procedure have been explained to the patient and/or legal guardian.  o Patient has been evaluated and can tolerate anesthesia and/or sedation. Risks, benefits, and alternatives to anesthesia and sedation have been explained to the patient and/or legal guardian.  o PREP: Per protocol;  o IV: Per Anesthesia;  o *******************************************  o PRE- OP MEDICATION ORDER:  o *******************************************  o Kefzol 2 grams IV on call to OR.  o ***************  o Date of Surgical Procedure:   o Please sign permit for:  o Minimally Invasive Laminectomy, left approach  o lumbar three to lumbar four  o lumbar four to lumbar five  o lumbar five to sacral one  o The above History and Physical must have been completed within 30 days of admission.            Electronically Signed by: Maikol Nails MD -Author on September 1, 2020 11:06:32 AM

## 2021-05-13 NOTE — PROGRESS NOTES
Progress Note      Patient Name: Kelsy Rider   Patient ID: [id number]   Sex: Female   YOB: 1950    Referring Provider: Jonnie Zuñiga MD    Visit Date: November 5, 2020    Provider: Kathy Patel PA-C   Location: [facility]   Location Address: [address] [place] [address]  [id number]   Location Phone: [phone]          Chief Complaint     3 week post op       History Of Present Illness     Here for 3 week post op visit s/p three level lumbar laminectomy.  Able to walk a lot further now and leg pain has improved.  Still having some burning pain in the right lateral lower leg and bottom of the foot and has been diagnosed with neuropathy. Denies fever or drainage from the wound.       Past Medical History  Arthritis; Diabetes; Diabetes mellitus, insulin dependent (IDDM), controlled; Hyperlipemia; Hyperlipidemia; Hypertension; Hypertension, Benign Essential; Leg pain; Lumbago; Lumbar Spinal Stenosis; Synovial Cyst; Thyroid Disease         Past Surgical History  cardiac stents; Cholecystecomy; Gallbladder; Lumbar laminectomy; Shoulder surgery         Medication List  alendronate 70 mg oral tablet; aspirin 81 mg oral tablet,delayed release (DR/EC); atenolol-chlorthalidone 100-25 mg oral tablet; atorvastatin 20 mg oral tablet; Calcium 600 + D(3) 600 mg calcium- 200 unit oral capsule; Co Q-10 100 mg oral capsule; famotidine 20 mg oral tablet; Fosamax 70 mg oral tablet; gabapentin 600 mg oral tablet; Lantus Solostar U-100 Insulin 100 unit/mL (3 mL) subcutaneous insulin pen; levothyroxine 100 mcg oral tablet; lisinopril 40 mg oral tablet; meloxicam 15 mg oral tablet; meloxicam 7.5 mg oral tablet; metformin 850 mg oral tablet; Novolog Flexpen U-100 Insulin 100 unit/mL subcutaneous insulin pen; Repatha Syringe 140 mg/mL subcutaneous syringe; Victoza 2-Osorio 0.6 mg/0.1 mL (18 mg/3 mL) subcutaneous pen injector         Allergy List  morphine; Plavix       Allergies  "Reconciled  Family Medical History  Heart Disease; Cancer, Unspecified; Diabetes; Family history of cancer; Family history of heart disease; Family history of diabetes mellitus         Social History  Alcohol (Never); Denies illicit substance abuse (Never); lives with spouse; ; Tobacco (Never)         Review of Systems  · Constitutional  o Admits  o : weight gain  o Denies  o : chills, excessive sweating, fatigue, fever, sycope/passing out, weight loss  · Eyes  o Denies  o : changes in vision, blurry vision, double vision  · HENT  o Denies  o : loss of hearing, ringing in the ears, ear aches, sore throat, nasal congestion, sinus pain, nose bleeds, seasonal allergies  · Cardiovascular  o Denies  o : blood clots, swollen legs, anemia, easy burising or bleeding, transfusions  · Respiratory  o Denies  o : shortness of breath, dry cough, productive cough, pneumonia, COPD  · Gastrointestinal  o Denies  o : difficulty swallowing, reflux  · Genitourinary  o Denies  o : incontinence  · Neurologic  o Admits  o : numbness/tingling/paresthesia , weakness  o Denies  o : headache, seizure, stroke, tremor, loss of balance, falls, dizziness/vertigo, difficulty with sleep, difficulty with coordination, difficulty with dexterity  · Musculoskeletal  o Admits  o : lbp and leg pain  · Endocrine  o Admits  o : diabetes, thyroid disorder  · Psychiatric  o Denies  o : anxiety, depression  · All Others Negative      Vitals  Date Time BP Position Site L\R Cuff Size HR RR TEMP (F) WT  HT  BMI kg/m2 BSA m2 O2 Sat FR L/min FiO2        11/05/2020 02:57 PM        96.9 177lbs 1oz 5'  2\" 32.38 1.87             Physical Examination     lumbar incision has healed and no signs of infection, gait and station are wnl           Assessment  · Lumbar Spinal Stenosis     724.02/M48.06  L3/4, L4/5 with neurogenic claudication she also has lateral recess stenosis at L5-S1 with possible synovial cyst ( s/p MIL)      Plan  · Medications  o Medications " have been Reconciled  o Transition of Care or Provider Policy  · Instructions  o She is doing well and will wean off gabapentin and is down to 7.5 mg of meloxicam now.             Electronically Signed by: Kathy Patel PA-C -Author on November 5, 2020 03:32:53 PM

## 2021-05-13 NOTE — PROGRESS NOTES
Progress Note      Patient Name: Kelsy Rider   Patient ID: [id number]   Sex: Female   YOB: 1950    Referring Provider: Jonnie Zuñiga MD    Visit Date: August 4, 2020    Provider: aMikol Nails MD   Location: [facility]   Location Address: [address] [place] [address]  [id number]   Location Phone: [phone]          Chief Complaint  · Follow-up     Here with complaints of worsening low back and bilateral leg pain. No recent imaging.       History Of Present Illness  The patient is a 70 year old /White female who is in the office for followup appointment. She has spinal stenosis at L3-4 and L4-5. She has bilateral burning leg pain. It is worse with standing for a period of time. Her toes feel swollen, but they are not. It is better with facet injections for a couple of weeks, a lumbar epidural didn't. It improves with sitting or lying down.       Past Medical History  Arthritis; Diabetes; Diabetes mellitus, insulin dependent (IDDM), controlled; Hyperlipemia; Hyperlipidemia; Hypertension; Hypertension, Benign Essential; Leg pain; Lumbago; Lumbar Spinal Stenosis; Synovial Cyst; Thyroid Disease         Past Surgical History  cardiac stents; Cholecystecomy; Gallbladder; Shoulder surgery         Medication List  alendronate 70 mg oral tablet; aspirin 81 mg oral tablet,delayed release (DR/EC); atenolol-chlorthalidone 100-25 mg oral tablet; atorvastatin 20 mg oral tablet; Calcium 600 + D(3) 600 mg calcium- 200 unit oral capsule; Co Q-10 100 mg oral capsule; famotidine 20 mg oral tablet; gabapentin 600 mg oral tablet; Lantus Solostar U-100 Insulin 100 unit/mL (3 mL) subcutaneous insulin pen; levothyroxine 100 mcg oral tablet; lisinopril 40 mg oral tablet; meloxicam 15 mg oral tablet; metformin 850 mg oral tablet; Novolog Flexpen U-100 Insulin 100 unit/mL subcutaneous insulin pen; Repatha Syringe 140 mg/mL subcutaneous syringe; Victoza 2-Osorio 0.6 mg/0.1 mL (18 mg/3 mL)  "subcutaneous pen injector         Allergy List  morphine; Plavix       Allergies Reconciled  Family Medical History  Heart Disease; Cancer, Unspecified; Diabetes; Family history of cancer; Family history of heart disease; Family history of diabetes mellitus         Social History  Alcohol (Never); Denies illicit substance abuse (Never); lives with spouse; ; Tobacco (Never)         Review of Systems  · Constitutional  o Admits  o : weight gain  o Denies  o : chills, excessive sweating, fatigue, fever, sycope/passing out, weight loss  · Eyes  o Denies  o : changes in vision, blurry vision, double vision  · HENT  o Denies  o : loss of hearing, ringing in the ears, ear aches, sore throat, nasal congestion, sinus pain, nose bleeds, seasonal allergies  · Cardiovascular  o Denies  o : blood clots, swollen legs, anemia, easy burising or bleeding, transfusions  · Respiratory  o Denies  o : shortness of breath, dry cough, productive cough, pneumonia, COPD  · Gastrointestinal  o Admits  o : reflux  o Denies  o : difficulty swallowing  · Genitourinary  o Denies  o : incontinence  · Neurologic  o Admits  o : difficulty with sleep, numbness/tingling/paresthesia , weakness  o Denies  o : headache, seizure, stroke, tremor, loss of balance, falls, dizziness/vertigo, difficulty with coordination, difficulty with dexterity  · Musculoskeletal  o Admits  o : joint pain, pain radiating in leg, low back pain  o Denies  o : neck stiffness/pain, swollen lymph nodes, muscle aches, weakness, spasms, sciatica, pain radiating in arm  · Endocrine  o Admits  o : diabetes, thyroid disorder  · Psychiatric  o Denies  o : anxiety, depression  · All Others Negative      Vitals  Date Time BP Position Site L\R Cuff Size HR RR TEMP (F) WT  HT  BMI kg/m2 BSA m2 O2 Sat        08/04/2020 02:22 PM        97.1 178lbs 8oz 5'  2\" 32.65 1.88           Physical Examination  · Constitutional  o Appearance  o : well-nourished, well developed, alert, in no " acute distress  · Respiratory  o Respiratory Effort  o : breathing unlabored  · Cardiovascular  o Peripheral Vascular System  o :   § Extremities  § : no cyanosis, clubbing or edema  · Psychiatric  o Mood and Affect  o : mood normal, affect appropriate          Assessment  · Lumbar Spinal Stenosis     724.02/M48.06  L3/4, L4/5 with neurogenic claudication  · Synovial Cyst     727.40/M71.30  Left L5/S1      Plan  · Orders  o MRI lumbar spine w/o contrast ([id number]) - - 08/04/2020   [place] back and leg pain[place]  · Medications  o Medications have been Reconciled  o Transition of Care or Provider Policy  · Instructions  o She will obtain a new MRI to evaluate and plan for surgery.             Electronically Signed by: Maikol Nails MD -Author on August 4, 2020 03:44:29 PM

## 2021-05-14 VITALS — BODY MASS INDEX: 32.58 KG/M2 | WEIGHT: 177.06 LBS | HEIGHT: 62 IN | TEMPERATURE: 96.9 F

## 2021-05-14 VITALS — BODY MASS INDEX: 32.61 KG/M2 | WEIGHT: 177.19 LBS | TEMPERATURE: 97.5 F | HEIGHT: 62 IN

## 2021-05-15 VITALS — TEMPERATURE: 97.1 F | WEIGHT: 178.5 LBS | HEIGHT: 62 IN | BODY MASS INDEX: 32.85 KG/M2

## 2021-05-16 VITALS
HEART RATE: 96 BPM | WEIGHT: 173.44 LBS | BODY MASS INDEX: 31.92 KG/M2 | SYSTOLIC BLOOD PRESSURE: 145 MMHG | DIASTOLIC BLOOD PRESSURE: 70 MMHG | HEIGHT: 62 IN

## 2021-05-18 NOTE — PROGRESS NOTES
Kelsy Rider  1950     Office/Outpatient Visit    Visit Date: Fri, Dec 13, 2019 11:20 am    Provider: Jonnie Zuñiga MD (Assistant: Kati Arroyo MA)    Location: [facility]        Electronically signed by Jonnie Zuñiga MD on  12/15/2019 03:32:06 PM                             Subjective:        CC: Mrs. Rider is a 69 year old White female.  This is a follow-up visit.  The patient is accompanied into the exam room by her spouse.  check up;         HPI:     Accompanied to the office visit today by her .  She continues to have significant back pain which is functionally limiting to her.  She did make the appointment to the [facility] this morning and they have suggested that she consider surgery.  Might possibly try epidural injections first.She does state that her spine surgeon asked she get a bone density study for which she is due.      UTD w eye exam    Mrs. Rider presents with type 2 diabetes mellitus without complications.  Compliance with treatment has been good.  She specifically denies associated symptoms, including nocturia, polydipsia and polyuria.  She reports home blood glucose readings have been a bit high, with average fasting readings in the 150-180 mg/dL range. She checks her glucose 1 to 2 times daily.            Concerning mixed hyperlipidemia, compliance with treatment has been good.  She specifically denies associated symptoms, including muscle pain and weakness.            Due for breast cancer screening.  She does SBE and is without concern from her own exams.      ROS:     CONSTITUTIONAL:  Negative for chills, fatigue, fever, and weight change.      EYES:  Negative for blurred vision.      CARDIOVASCULAR:  Negative for chest pain, orthopnea, paroxysmal nocturnal dyspnea and pedal edema.      RESPIRATORY:  Negative for dyspnea.      GASTROINTESTINAL:  Negative for abdominal pain, constipation, diarrhea, nausea and vomiting.      GENITOURINARY:   Negative for dysuria and frequent urination.      NEUROLOGICAL:  Negative for dizziness, headaches, paresthesias, and weakness.      PSYCHIATRIC:  Negative for anxiety, depression, and sleep disturbances.          Past Medical History / Family History / Social History:         Last Reviewed on 10/14/2019 01:20 PM by Jonnie Zuñiga    Past Medical History:                 PAST MEDICAL HISTORY         Hyperlipidemia     Hypothyroidism         PREVENTIVE HEALTH MAINTENANCE             BONE DENSITY: was last done 11/3/15 with the following abnormality noted-- osteopenia     COLORECTAL CANCER SCREENING: Up to date (colonoscopy q10y; sigmoidoscopy q5y; Cologuard q3y) was last done 2015, Results are in chart     EYE EXAM: Diabetic Eye Exam during this calendar year and results are in chart was last done 02/21/2018     MAMMOGRAM: was last done 11/3/15 with normal results         PAST MEDICAL HISTORY         Positive for    Stress test 10/2019- [place];         CURRENT MEDICAL PROVIDERS:    Cardiologist: Wagner    Dermatologist: Tacho    General Surgeon: Dr. Calixto    Ophthalmologist: Dr. Aranda    Orthopedist: Dr. Mumtaz Mathis    Pulmonologist: Vaibhav Singleton ([place] [facility])         Surgical History:         Cholecystectomy    Coronary Artery Stent Placement: 2/13/12;     Tubal Ligation     Breast Bx- Left- Negative; 2015     Epidural Injections;         Family History:     Father: Myocardial Infarction     Mother: Lung Cancer     Brother(s): Type 2 Diabetes     Son(s): Type 1 Diabetes     Daughter(s): Hypothyroidism         Social History:     Occupation: Retired (Prior occupation: [employer])     Marital Status:      Children: 3 children         Tobacco/Alcohol/Supplements:     Last Reviewed on 12/03/2019 02:29 PM by Kathleen Lopez    Tobacco: She has never smoked.          Allergies:     Last Reviewed on 12/03/2019 02:29 PM by Kathleen Lopez    Plavix:          Current Medications:     Last Reviewed on 12/03/2019  "02:29 PM by Kathleen Lopez    aspirin 81 mg oral tablet, delayed release (enteric coated) [1 tab daily]    Caltrate-600 Plus Vitamin D3 600 mg(1,500mg) -400 unit oral tablet [Take 1 tablet(s) by mouth daily]    Synthroid 0.1mg Tablet [Take 1 tablet(s) by mouth daily]    Atenolol/Chlorthalidone 50mg/25mg Tablet [Take 1/2  tablet by mouth BID]    Glucophage 850mg Tablet [Take 1 tablet(s) by mouth daily]    Glucose Reagent Blood Test Strips  Reagent Strips [check blood sugar up to TID with accuc chek shannon plus DX E11.9]    Lisinopril 40 mg oral tablet [Take 1 tablet(s) by mouth daily]    Lantus SoloSTAR 100units/1ml Injection [70 UNITS QDAY]    NovoLOG Flexpen U-100 Insulin 100 unit/mL (3 mL) subcutaneous Insulin Pen [take 4U in am, 14 U with noon meal and 6 U with evening along withsliding scale max daily 40 units DX E11.9]    BD Ultra-Fine Ita Pen Needle 32G x 5/32\"  Pen Needle [use as directed with victoza, lantus, & novolog Dx E11.9]    Victoza 2-Osorio 18mg/3ml Injection [Inject 1.2 mg daily]    atorvastatin 20 mg oral tablet [Take 1 tablet(s) by mouth daily]    Famotidine 20 mg oral tablet [1 tab daily ]    Medrol (Osorio) 4 mg oral Tablet, Dose Pack [take as directed]    Percocet 5-325 mg oral tablet [take 1 tablet by oral route every 6 hours as needed for pain]        Objective:        Vitals:         Current: 12/13/2019 11:28:05 AM    Ht:  5 ft, 2.25 in;  Wt: 175 lbs;  BMI: 31.8T: 97.7 F (oral);  BP: 99/61 mm Hg (left arm, sitting);  P: 85 bpm (left arm (BP Cuff), sitting);  sCr: 0.83 mg/dL;  GFR: 67.66        Exams:     PHYSICAL EXAM:     GENERAL: well developed, well nourished;  well groomed;  no apparent distress;     EYES: nonicteric;     E/N/T:  normal EACs, TMs, nasal/oral mucosa, teeth, gingiva, and oropharynx;     NECK:  supple, full ROM; no thyromegaly; no carotid bruits;     RESPIRATORY: normal appearance and symmetric expansion of chest wall; normal respiratory rate and pattern with no distress; normal " breath sounds with no rales, rhonchi, wheezes or rubs;     CARDIOVASCULAR: normal rate; rhythm is regular;  no systolic murmur;     GASTROINTESTINAL: nontender, nondistended; no hepatosplenomegaly or masses; no bruits;     LYMPHATIC: no enlargement of cervical or facial nodes; no supraclavicular nodes;     BREAST/INTEGUMENT: BREASTS: breast exam is normal without masses, skin changes, or nipple discharge;     MUSCULOSKELETAL: no pedal edema; Does take considerable effort for her to stand from a sitting position and she walks in a partially bent posture.      NEUROLOGIC: no focal lateralizing deficits.;     PSYCHIATRIC:  appropriate affect and demeanor; normal speech pattern; grossly normal memory;         Assessment:         M48.062   Spinal stenosis, lumbar region with neurogenic claudication       E11.9   Type 2 diabetes mellitus without complications       E78.2   Mixed hyperlipidemia       I10   Essential (primary) hypertension       M85.859   Other specified disorders of bone density and structure, unspecified thigh       Z12.39   Encounter for other screening for malignant neoplasm of breast           ORDERS:         Radiology/Test Orders:       [id number]  Screening digital breast tomosynthesis bi  (Send-Out)            66186  DXA, bone density study, 1 or more sites; axial skeleton (eg hips, pelvis, spine)  (Send-Out)                      Plan:         Spinal stenosis, lumbar region with neurogenic claudicationWe will send for her records from [facility] if you do not receive those next day or 2.        Type 2 diabetes mellitus without complicationsCertainly with her back pain and steroid use blood sugar control is been more difficult for her but we cannot lose focus of that.  She will be due for repeat lab work in January.        Mixed hyperlipidemiaContinue current medications for now we will repeat lab work in January        Essential (primary) hypertensionHer blood pressure looks amazingly good  today considering she is in pain.  Makes me doubt the validity of the reading.She is not having any orthostatic type symptoms.        Other specified disorders of bone density and structure, unspecified thighShe is due for repeat DEXA scan will get that scheduled for her        FOLLOW-UP TESTING #1:    RADIOLOGY:  I have ordered Dexa Scan to be done today.            Orders:       [id number]  DXA, bone density study, 1 or more sites; axial skeleton (eg hips, pelvis, spine)  (Send-Out)              Encounter for other screening for malignant neoplasm of breastBreast exam was unremarkable we will get her scheduled for a mammogram as well.        FOLLOW-UP TESTING #1:    RADIOLOGY:  I have ordered Screening 3D Mammogram Bilateral to be done today.            Orders:       [id number]  Screening digital breast tomosynthesis bi  (Send-Out)                  Charge Capture:         Primary Diagnosis:     M48.062  Spinal stenosis, lumbar region with neurogenic claudication           Orders:      [id number]  Office/outpatient visit; established patient, level 4  (In-House)              E11.9  Type 2 diabetes mellitus without complications     E78.2  Mixed hyperlipidemia     I10  Essential (primary) hypertension     M85.859  Other specified disorders of bone density and structure, unspecified thigh     Z12.39  Encounter for other screening for malignant neoplasm of breast

## 2021-05-18 NOTE — PROGRESS NOTES
Kelsy Rider 1950     Office/Outpatient Visit    Visit Date: Mon, Apr 13, 2020 11:12 am    Provider: Jonnie Zuñiga MD (Assistant: Shira Philippe MA )    Location: [facility]        Electronically signed by Jonnie Zuñiga MD on  04/13/2020 12:14:13 PM                             Subjective:        CC: TELEHEALTH- CONSENT BY MNP(PT IS NOT TAKING PERCOCET)GABAPENTIN 600MG TID)    HPI:       As far as her diabetes she is trying to do a better job with diet is been hard since isolated to the house and Easter.BS have been up some lately.  This morning BS was 91, but has been as high as 167.  One evening at dinner was 322, but had forgotten to take her lunch time shot. Her last hemoglobin A1c was good      Started on Repatha in the interium too- first shot was March 9th.      Back in March Pain Mgt incresed virgie 600 tid.  BS got a little messed up afterward and then  toes started feeling funny at that time too.  The toes feel swollen like a string tied around then, at times cold feeling, and at times burning up. The bad pain in the back and legs are a lot better.      Has been checking 164/88, 159/76, 134/76, 110/68.   Most of the time has been pretty good.           In regard to the hypothyroidism, unspecified, she denies any related symptoms.  She reports no symptoms suggestive of adverse medication effect.      ROS:     CONSTITUTIONAL:  Negative for chills, fatigue, fever, and weight change.      EYES:  Negative for blurred vision.      CARDIOVASCULAR:  Negative for chest pain, orthopnea, paroxysmal nocturnal dyspnea and pedal edema.      RESPIRATORY:  Negative for dyspnea.      GASTROINTESTINAL:  Negative for abdominal pain, constipation, diarrhea, nausea and vomiting.      GENITOURINARY:  Negative for dysuria and frequent urination.      NEUROLOGICAL:  Negative for dizziness, headaches, paresthesias, and weakness.      PSYCHIATRIC:  Negative for anxiety, depression, and sleep  disturbances.          Past Medical History / Family History / Social History:         Last Reviewed on 12/13/2019 12:00 PM by Jonnie Zuñiga    Past Medical History:                 PAST MEDICAL HISTORY         Hyperlipidemia     Hypothyroidism         PREVENTIVE HEALTH MAINTENANCE             BONE DENSITY: was last done 11/3/15 with the following abnormality noted-- osteopenia     COLORECTAL CANCER SCREENING: Up to date (colonoscopy q10y; sigmoidoscopy q5y; Cologuard q3y) was last done 2015, Results are in chart     EYE EXAM: Diabetic Eye Exam during this calendar year and results are in chart was last done 02/21/2018     MAMMOGRAM: was last done 11/3/15 with normal results         PAST MEDICAL HISTORY         Positive for    Stress test 10/2019- Flaget;         CURRENT MEDICAL PROVIDERS:    Cardiologist: Wagner    Dermatologist: Tacho    General Surgeon: Dr. Calixto    Ophthalmologist: Dr. Aranda    Orthopedist: Dr. Mumtaz Mathis    Pulmonologist: Vaibhav Singleton ([place] pulmon[facility])         Surgical History:         Cholecystectomy    Coronary Artery Stent Placement: 2/13/12;     Tubal Ligation     Breast Bx- Left- Negative; 2015     Epidural Injections;         Family History:     Father: Myocardial Infarction     Mother: Lung Cancer     Brother(s): Type 2 Diabetes     Son(s): Type 1 Diabetes     Daughter(s): Hypothyroidism         Social History:     Occupation: Retired (Prior occupation: [employer])     Marital Status:      Children: 3 children         Tobacco/Alcohol/Supplements:     Last Reviewed on 3/23/2020 10:06 AM by Spurling, Sarah C    Tobacco: She has never smoked.          Allergies:     Last Reviewed on 3/23/2020 10:06 AM by Spurling, Sarah C    Plavix:          Current Medications:     Last Reviewed on 3/23/2020 10:13 AM by Spurling, Sarah C    gabapentin 300 mg oral capsule [take 1 capsule (300 mg) by oral route 4 times per day]    CoQ-10 100 mg oral capsule [once a day ]    meloxicam 15 mg  "oral tablet [take 1 tablet (15 mg) by oral route once daily]    Repatha SureClick 140 mg/mL subcutaneous Pen Injector [inject 1 milliliter (140 mg) by subcutaneous route every 2 weeks in the abdomen, thigh, or outer area of upper arm (rotate sites)]    aspirin 81 mg oral tablet, delayed release (enteric coated) [1 tab daily]    Caltrate-600 Plus Vitamin D3 600 mg(1,500mg) -400 unit oral tablet [Take 1 tablet(s) by mouth daily]    levothyroxine 100 mcg oral tablet [TAKE ONE TABLET BY MOUTH DAILY]    Glucophage 850 mg oral tablet [TAKE ONE TABLET BY MOUTH DAILY]    atenolol-chlorthalidone 50-25 mg oral tablet [TAKE ONE-HALF TABLET BY MOUTH TWO TIMES A DAY]    atenoloL-chlorthalidone 50-25 mg oral tablet [TAKE 1/2 OF A TABLET BY MOUTH TWO TIMES A DAY]    Glucose Reagent Blood Test Strips  Reagent Strips [check blood sugar up to TID with accuc chek shannon plus DX E11.9]    lisinopriL 40 mg oral tablet [TAKE ONE TABLET BY MOUTH DAILY]    Lantus Solostar U-100 Insulin 100 unit/mL (3 mL) subcutaneous Insulin Pen [INJECT 70 UNITS SUBCUTANEOUSLY DAILY]    NovoLOG Flexpen U-100 Insulin 100 unit/mL (3 mL) subcutaneous Insulin Pen [take 4U in am, 14 U with noon meal and 6 U with evening along withsliding scale max daily 40 units DX E11.9]    BD Ultra-Fine Ita Pen Needle 32G x 5/32\"  Pen Needle [use as directed with victoza, lantus, & novolog Dx E11.9]    Victoza 2-Osorio 0.6 mg/0.1 mL (18 mg/3 mL) subcutaneous Pen Injector [DIAL AND INJECT SUBCUTANEOUSLY 1.2MG DAILY]    atorvastatin 20 mg oral tablet [TAKE ONE TABLET BY MOUTH DAILY]    Famotidine 20 mg oral tablet [1 tab daily ]    Percocet 5-325 mg oral tablet [take 1 tablet by oral route every 6 hours as needed for pain]        Objective:        Vitals:         Current: 4/13/2020 11:17:23 AM    Ht:  5 ft, 2.25 inBP: 143/76 mm Hg (right arm, sitting);  P: 77 bpm (left arm (BP Cuff), sitting);  sCr: 0.98 mg/dL;  GFR: 54.00        Exams: Telehealth visit via [employer] video.On the " "video telehealth visit patient looked well.  No acute distress.  Color look good.  Eyes were nonicteric.  Had no evidence of respiratory infection.  No cough or congestion.  Patient was up and ambulatory.  Mental health seem good.  Has good insight into the coronavirus issues.        Assessment:         E11.9   Type 2 diabetes mellitus without complications       E78.2   Mixed hyperlipidemia       M54.17   Radiculopathy, lumbosacral region       I10   Essential (primary) hypertension       E03.9   Hypothyroidism, unspecified           ORDERS:         Meds Prescribed:       [Refilled] NovoLOG Flexpen U-100 Insulin 100 unit/mL (3 mL) subcutaneous Insulin Pen [take 4U in am, 14 U with noon meal and 6 U with evening along withsliding scale max daily 40 units DX E11.9], #15 (fifteen) each, Refills: 0 (zero)       [Refilled] BD Ultra-Fine Ita Pen Needle 32 gauge x 5/32\" [use as directed with victoza, lantus, & novolog Dx E11.9], #200 (two hundred) each, Refills: 1 (one)                 Plan: We discussed issues related to coronavirus/Corvid-19 disease including the importance of social distancing, good hygiene, behaviors for visitors/family, safe grocery shopping practices, etc.  If does have any symptoms call us to discuss so we can help decide if needs to be seen or manage over phone.  Also advised to stay ahead on medication refills and have extra on hand.         Type 2 diabetes mellitus without complicationsWe will hold off on repeat labs until she is here for her repeat appointment in June.          Prescriptions:       [Refilled] NovoLOG Flexpen U-100 Insulin 100 unit/mL (3 mL) subcutaneous Insulin Pen [take 4U in am, 14 U with noon meal and 6 U with evening along withsliding scale max daily 40 units DX E11.9], #15 (fifteen) each, Refills: 0 (zero)       [Refilled] BD Ultra-Fine Ita Pen Needle 32 gauge x 5/32\" [use as directed with victoza, lantus, & novolog Dx E11.9], #200 (two hundred) each, Refills: 1 (one)    "      Mixed hyperlipidemiaHer lipid profile did not look terrible before but she is on Repatha and is getting paid for so we will see how that goes    Telehealth: Verbal consent obtained for visit to occur via televideo conferencing; Staff, other than provider, present during telephone visit include none; Total time spent was 20 minutes         Radiculopathy, lumbosacral regionContinue to follow with pain management and continue on current medical regimen        Hypothyroidism, unspecifiedContinue current medical regimen we will repeat lab in June when she has her next appointment            Other Patient Education Handouts:     Dragon transcription disclaimer:        Much of this encounter note is an electronic transcription/translation of spoken language to printed text.  The electronic translation of spoken language may permit erroneous, or at times, nonsensical words or phrases to be inadvertently transcribed.  Although I have reviewed the note for such errors, some may still exist.        Charge Capture:         Primary Diagnosis:     E11.9  Type 2 diabetes mellitus without complications           Orders:      61931  Office/outpatient visit; established patient, level 4  (In-House)              E78.2  Mixed hyperlipidemia     M54.17  Radiculopathy, lumbosacral region     I10  Essential (primary) hypertension     E03.9  Hypothyroidism, unspecified         ADDENDUMS:      ____________________________________    Addendum: 04/15/2020 12:49 PM - Jonnie Zuñiga        It was never the intent for this record to be billed based on time. It is to be billed based on E/M services provided.

## 2021-05-18 NOTE — PROGRESS NOTES
"Kelsy Rider  1950     Office/Outpatient Visit    Visit Date: Mon, Mar 23, 2020 10:05 am    Provider: Jonnie Zuñiga MD (Assistant: Spurling, Sarah C, MA)    Location: [facility]        Electronically signed by Jonnie Zuñiga MD on  03/23/2020 11:01:37 AM                             Subjective:        CC: Mrs. Rider is a 70 year old White female.  cough;         HPI:           Mrs. Rider presents with acute upper respiratory infection, unspecified.  These have been present for the past 3 days.  The symptoms include cough, headache, nasal discharge and sore throat.  She denies exposure to ill contacts.  No allergies usually. I haven't had anything like this for long time. Only place I've been is one trip [facility] and one doctor office (Dr Mathis). has \"sinuses\" going on.  He did go to work last week and was sent to \"the doctor\" for evaluation and not been back to work.  Neither of us have had fever. Seems worse at night time.Granddaughter lives with her and works at [employer], but has not been symptomatic.       Does have DM and BS have up and down.  Running  (but likely due to icecream).      BP running up today, she doesn't check often at home.     ROS:     CONSTITUTIONAL:  Negative for chills, fatigue, fever, and weight change.      EYES:  Negative for blurred vision.      CARDIOVASCULAR:  Negative for chest pain, orthopnea, paroxysmal nocturnal dyspnea and pedal edema.      RESPIRATORY:  Negative for dyspnea.      GASTROINTESTINAL:  Negative for abdominal pain, constipation, diarrhea, nausea and vomiting.      GENITOURINARY:  Negative for dysuria and frequent urination.      NEUROLOGICAL:  Negative for dizziness, headaches, paresthesias, and weakness.      PSYCHIATRIC:  Negative for anxiety, depression, and sleep disturbances.          Past Medical History / Family History / Social History:         Last Reviewed on 12/13/2019 12:00 PM by Jonnie Zuñiga    Past Medical " History:                 PAST MEDICAL HISTORY         Hyperlipidemia     Hypothyroidism         PREVENTIVE HEALTH MAINTENANCE             BONE DENSITY: was last done 11/3/15 with the following abnormality noted-- osteopenia     COLORECTAL CANCER SCREENING: Up to date (colonoscopy q10y; sigmoidoscopy q5y; Cologuard q3y) was last done 2015, Results are in chart     EYE EXAM: Diabetic Eye Exam during this calendar year and results are in chart was last done 02/21/2018     MAMMOGRAM: was last done 11/3/15 with normal results         PAST MEDICAL HISTORY         Positive for    Stress test 10/2019- [facility];         CURRENT MEDICAL PROVIDERS:    Cardiologist: Wagner    Dermatologist: Tacho    General Surgeon: Dr. Calixto    Ophthalmologist: Dr. Aranda    Orthopedist: Dr. Mumtaz Mathis    Pulmonologist: Vaibhav Singleton ([place] pul[facility])         Surgical History:         Cholecystectomy    Coronary Artery Stent Placement: 2/13/12;     Tubal Ligation     Breast Bx- Left- Negative; 2015     Epidural Injections;         Family History:     Father: Myocardial Infarction     Mother: Lung Cancer     Brother(s): Type 2 Diabetes     Son(s): Type 1 Diabetes     Daughter(s): Hypothyroidism         Social History:     Occupation: Retired (Prior occupation: [employer])     Marital Status:      Children: 3 children         Tobacco/Alcohol/Supplements:     Last Reviewed on 12/13/2019 11:21 AM by Kati Arroyo    Tobacco: She has never smoked.          Allergies:     Last Reviewed on 12/13/2019 11:21 AM by Kati Arroyo    Plavix:          Current Medications:     Last Reviewed on 3/23/2020 10:13 AM by Spurling, Sarah C    gabapentin 300 mg oral capsule [take 1 capsule (300 mg) by oral route 4 times per day]    CoQ-10 100 mg oral capsule [once a day ]    aspirin 81 mg oral tablet, delayed release (enteric coated) [1 tab daily]    Caltrate-600 Plus Vitamin D3 600 mg(1,500mg) -400 unit oral tablet [Take 1 tablet(s) by mouth daily]     "levothyroxine 100 mcg oral tablet [TAKE ONE TABLET BY MOUTH DAILY]    atenoloL-chlorthalidone 50-25 mg oral tablet [TAKE 1/2 OF A TABLET BY MOUTH TWO TIMES A DAY]    Glucophage 850 mg oral tablet [TAKE ONE TABLET BY MOUTH DAILY]    atenolol-chlorthalidone 50-25 mg oral tablet [TAKE ONE-HALF TABLET BY MOUTH TWO TIMES A DAY]    Glucose Reagent Blood Test Strips  Reagent Strips [check blood sugar up to TID with accuc chek shannon plus DX E11.9]    lisinopril 40 mg oral tablet [TAKE ONE TABLET BY MOUTH DAILY]    Lantus Solostar U-100 Insulin 100 unit/mL (3 mL) subcutaneous Insulin Pen [INJECT 70 UNITS SUBCUTANEOUSLY DAILY]    NovoLOG Flexpen U-100 Insulin 100 unit/mL (3 mL) subcutaneous Insulin Pen [take 4U in am, 14 U with noon meal and 6 U with evening along withsliding scale max daily 40 units DX E11.9]    BD Ultra-Fine Ita Pen Needle 32G x 5/32\"  Pen Needle [use as directed with victoza, lantus, & novolog Dx E11.9]    Victoza 2-Osorio 0.6 mg/0.1 mL (18 mg/3 mL) subcutaneous Pen Injector [DIAL AND INJECT SUBCUTANEOUSLY 1.2MG DAILY]    atorvastatin 20 mg oral tablet [TAKE ONE TABLET BY MOUTH DAILY]    Famotidine 20 mg oral tablet [1 tab daily ]    Percocet 5-325 mg oral tablet [take 1 tablet by oral route every 6 hours as needed for pain]    meloxicam 15 mg oral tablet [take 1 tablet (15 mg) by oral route once daily]    Repatha SureClick 140 mg/mL subcutaneous Pen Injector [inject 1 milliliter (140 mg) by subcutaneous route every 2 weeks in the abdomen, thigh, or outer area of upper arm (rotate sites)]        Objective:        Vitals:         Current: 3/23/2020 10:16:03 AM    Ht:  5 ft, 2.25 in;  Wt: 178.2 lbs;  BMI: 32.3T: 98.7 F (oral);  BP: 154/96 mm Hg (left arm, sitting);  P: 73 bpm (left arm (BP Cuff), sitting);  sCr: 0.98 mg/dL;  GFR: 56.98O2 Sat: 99 % (room air)        Repeat:     10:17:16 AM  BP:   151/71mm Hg (right arm, sitting, Second take, heart rate: 75)     Exams:     PHYSICAL EXAM:     GENERAL: well " developed, well nourished;  well groomed;  no apparent distress;     EYES: nonicteric;     E/N/T: EARS:  normal external auditory canals and tympanic membranes;  grossly normal hearing; OROPHARYNX:  normal mucosa, dentition, gingiva, and posterior pharynx;     NECK:  supple, full ROM; no thyromegaly; no carotid bruits;     RESPIRATORY: normal appearance and symmetric expansion of chest wall; normal respiratory rate and pattern with no distress; normal breath sounds with no rales, rhonchi, wheezes or rubs;     CARDIOVASCULAR: normal rate; rhythm is regular;  no systolic murmur;     MUSCULOSKELETAL: no pedal edema;     NEUROLOGIC: no focal lateralizing deficits.;     PSYCHIATRIC:  appropriate affect and demeanor; normal speech pattern; grossly normal memory;         Lab/Test Results:         Rapid Strep Screen: Negative (03/23/2020),     Performed by:: tls (03/23/2020),     Influenza A and B: Negative (03/23/2020),             Assessment:         J06.9   Acute upper respiratory infection, unspecified       E11.9   Type 2 diabetes mellitus without complications       I10   Essential (primary) hypertension           ORDERS:         Lab Orders:       [id number]  Infectious agent antigen detection by immunoassay; Influenza  (In-House)            [id number]  Infectious agent antigen detection by immunoassay; Influenza  (In-House)            48610  Group A Streptococcus detection by immunoassay with direct optical observation  (In-House)            53790  [facility] Throat culture, strep  (Send-Out)                      Plan:         Acute upper respiratory infection, unspecifiedsymptomatic treatment for now.  let us know if worsens or if sever soa go to er.          Orders:       [id number]  Infectious agent antigen detection by immunoassay; Influenza  (In-House)            87202  Infectious agent antigen detection by immunoassay; Influenza  (In-House)            06642  Group A Streptococcus detection by immunoassay with direct  optical observation  (In-House)            48689  [facility] Throat culture, strep  (Send-Out)              Type 2 diabetes mellitus without complicationsgive us update on BS to us next week too         Essential (primary) hypertensionCheck BP at home daily for next week and call us the results.             Charge Capture:         Primary Diagnosis:     J06.9  Acute upper respiratory infection, unspecified           Orders:      [id number]  Office/outpatient visit; established patient, level 4  (In-House)            70634-88  Infectious agent antigen detection by immunoassay; Influenza  (In-House)            77135  Infectious agent antigen detection by immunoassay; Influenza  (In-House)            72368  Group A Streptococcus detection by immunoassay with direct optical observation  (In-House)              E11.9  Type 2 diabetes mellitus without complications     I10  Essential (primary) hypertension

## 2021-05-18 NOTE — PROGRESS NOTES
Kelsy Rider  1950     Office/Outpatient Visit    Visit Date: Tue, Jun 23, 2020 08:47 am    Provider: Jonnie Zuñiga MD (Assistant: Spurling, Sarah C, MA)    Location: [facility]        Electronically signed by Jonnie Zuñiga MD on  06/23/2020 09:29:21 AM                             Subjective:        CC: Mrs. Rider is a 70 year old White female.  This is a follow-up visit.  *no longer on percocet, and she said that her gabapentin is a different dosage now as well that will need to be changed.* Routine follow up.;         HPI:           Patient to be evaluated for type 2 diabetes mellitus without complications.  Compliance with treatment has been good; she takes her medication as directed, follows up as directed, and is keeping a glucose diary.  Her blood sugar log mostly looks pretty good she does have some elevations          Concerning mixed hyperlipidemia, compliance with treatment has been good; she takes her medication as directed, maintains her low cholesterol diet, and follows up as directed.  She is on the Repatha every 2 weeks.  He will be interesting see what her lipid profile looks like today.          Additionally, she presents with history of essential (primary) hypertension.  compliance with treatment has been good; she takes her medication as directed and follows up as directed.        She continues to have considerable pain in her back and radiating into her legs.  Also has numbness and tingling in her feet and toes which need discussed as possibly related to neuropathy as well.  Hard to distinguish at this point.  She is on gabapentin but she does not think that it is done much for her.  It was making her sleepy so she dropped her midday dosage.      Her back is been bothering her enough she said she would consider having surgery.  Had previously gone to the [facility] but says that she would like to see Dr. Nails as a possible alternative.      We  also reviewed the results of her recent DEXA scan shows she does have some osteoporosis in the femur.  Discussed the treatment options pros and cons of treatment possible side effects of the medication.She understands no need to treat could be as high as 30    ROS:     CONSTITUTIONAL:  Negative for chills, fatigue, fever, and weight change.      EYES:  Negative for blurred vision.      CARDIOVASCULAR:  Negative for chest pain, orthopnea, paroxysmal nocturnal dyspnea and pedal edema.      RESPIRATORY:  Negative for dyspnea.      GASTROINTESTINAL:  Negative for abdominal pain, constipation, diarrhea, nausea and vomiting.      GENITOURINARY:  Negative for dysuria and frequent urination.      NEUROLOGICAL:  Negative for dizziness, headaches, paresthesias, and weakness.      PSYCHIATRIC:  Negative for anxiety, depression, and sleep disturbances.          Past Medical History / Family History / Social History:         Last Reviewed on 6/23/2020 08:55 AM by Jonnie Zuñiga    Past Medical History:                 PAST MEDICAL HISTORY         Hyperlipidemia     Hypothyroidism         PREVENTIVE HEALTH MAINTENANCE             BONE DENSITY: was last done 11/3/15 with the following abnormality noted-- osteopenia     COLORECTAL CANCER SCREENING: Up to date (colonoscopy q10y; sigmoidoscopy q5y; Cologuard q3y) was last done 2015, Results are in chart     EYE EXAM: Diabetic Eye Exam during this calendar year and results are in chart was last done 02/21/2018     MAMMOGRAM: was last done 11/3/15 with normal results         PAST MEDICAL HISTORY         Positive for    Stress test 10/2019- Flaget;         CURRENT MEDICAL PROVIDERS:    Cardiologist: Wagner    Dermatologist: Tacho    General Surgeon: Dr. Calixto    Ophthalmologist: Dr. Aarnda    Orthopedist: Dr. Mumtaz Mathis    Pulmonologist: Vaibhav Singleton ([place] pulmonology)         Surgical History:         Cholecystectomy    Coronary Artery Stent Placement: 2/13/12;     Tubal  Ligation     Breast Bx- Left- Negative; 2015     Epidural Injections;         Family History:     Father: Myocardial Infarction     Mother: Lung Cancer     Brother(s): Type 2 Diabetes     Son(s): Type 1 Diabetes     Daughter(s): Hypothyroidism         Social History:     Occupation: Retired (Prior occupation: [employer])     Marital Status:      Children: 3 children         Tobacco/Alcohol/Supplements:     Last Reviewed on 6/23/2020 08:47 AM by Spurling, Sarah C    Tobacco: She has never smoked.          Current Problems:     Last Reviewed on 6/23/2020 08:47 AM by Spurling, Sarah C    Hypothyroidism, unspecified    Type 2 diabetes mellitus without complications    Mixed hyperlipidemia    Essential (primary) hypertension    Pain in right shoulder    Pain in right hip    Disorder of bone density and structure, unspecified    Vitamin D deficiency, unspecified    Residual hemorrhoidal skin tags    Radiculopathy, lumbosacral region    Other long term (current) drug therapy    Encounter for screening for depression    Urgency of urination    Constipation, unspecified    Spinal stenosis, lumbar region with neurogenic claudication    Encounter for other screening for malignant neoplasm of breast    Other specified disorders of bone density and structure, unspecified thigh    Acute upper respiratory infection, unspecified        Allergies:     Last Reviewed on 6/23/2020 08:47 AM by Spurling, Sarah C    Plavix:      morphine:          Current Medications:     Last Reviewed on 6/23/2020 08:51 AM by Spurling, Sarah C    meloxicam 15 mg oral tablet [take 1 tablet (15 mg) by oral route once daily]    Repatha SureClick 140 mg/mL subcutaneous Pen Injector [inject 1 milliliter (140 mg) by subcutaneous route every 2 weeks in the abdomen, thigh, or outer area of upper arm (rotate sites)]    gabapentin 300 mg oral capsule [take 1 capsule (300 mg) by oral route 4 times per day]    CoQ-10 100 mg oral capsule [once a day ]     "levothyroxine 100 mcg oral tablet [TAKE ONE TABLET BY MOUTH DAILY]    aspirin 81 mg oral tablet, delayed release (enteric coated) [1 tab daily]    Caltrate-600 Plus Vitamin D3 600 mg(1,500mg) -400 unit oral tablet [Take 2 tablet(s) by mouth daily]    atenoloL-chlorthalidone 50-25 mg oral tablet [TAKE 1/2 OF A TABLET BY MOUTH TWO TIMES A DAY]    Glucophage 850 mg oral tablet [TAKE ONE TABLET BY MOUTH DAILY]    Glucose Reagent Blood Test Strips  Reagent Strips [check blood sugar up to TID with accuc chek shannon plus DX E11.9]    lisinopriL 40 mg oral tablet [TAKE ONE TABLET BY MOUTH DAILY]    Lantus Solostar U-100 Insulin 100 unit/mL (3 mL) subcutaneous Insulin Pen [INJECT 70 UNITS SUBCUTANEOUSLY DAILY]    NovoLOG Flexpen U-100 Insulin 100 unit/mL (3 mL) subcutaneous Insulin Pen [take 4U in am, 14 U with noon meal and 6 U with evening along withsliding scale max daily 40 units DX E11.9]    BD Ultra-Fine Ita Pen Needle 32 gauge x 5/32\"  [use as directed with victoza, lantus, & novolog Dx E11.9]    Victoza 2-Osorio 0.6 mg/0.1 mL (18 mg/3 mL) subcutaneous Pen Injector [DIAL AND INJECT SUBCUTANEOUSLY 1.2MG DAILY]    atorvastatin 20 mg oral tablet [TAKE ONE TABLET BY MOUTH DAILY]    Famotidine 20 mg oral tablet [1 tab daily ]    Percocet 5-325 mg oral tablet [take 1 tablet by oral route every 6 hours as needed for pain]        Objective:        Vitals:         Current: 6/23/2020 8:54:24 AM    Ht:  5 ft, 2.25 in;  Wt: 178.6 lbs;  BMI: 32.4T: 97.8 F (temporal);  BP: 130/72 mm Hg (left arm, sitting);  P: 81 bpm (left arm (BP Cuff), sitting);  sCr: 0.98 mg/dL;  GFR: 57.03        Exams:     PHYSICAL EXAM:     GENERAL: obese;  well groomed;  no apparent distress;     EYES: nonicteric;     E/N/T: EARS:  normal external auditory canals and tympanic membranes;  grossly normal hearing; OROPHARYNX:  normal mucosa, dentition, gingiva, and posterior pharynx;     NECK: carotid exam is normal with good upstroke and no bruits;     " RESPIRATORY: normal appearance and symmetric expansion of chest wall; normal respiratory rate and pattern with no distress; normal breath sounds with no rales, rhonchi, wheezes or rubs;     CARDIOVASCULAR: normal rate; rhythm is regular;  no systolic murmur;     LYMPHATIC: no enlargement of cervical or facial nodes; no supraclavicular nodes;     MUSCULOSKELETAL: no pedal edema;     NEUROLOGIC: no focal lateralizing deficits.  Monofilament test okay bilat feet.;     PSYCHIATRIC:  appropriate affect and demeanor; normal speech pattern; grossly normal memory;     Left foot exam    Protective sensation using Monofilament test: NORMAL sensation. Patient detects .07 grams of force which is considered normal.    Vascular status: normal peripheral vascular exam with palpable dorsal pedal and posterior tibal pulses and brisk digital capillary refill    Skin is intact without sores or ulcers    Right foot exam    Protective sensation using Monofilament test: NORMAL sensation. Patient detects .07 grams of force which is considered normal.    Vascular status: normal peripheral vascular exam with palpable dorsal pedal and posterior tibal pulses and brisk digital capillary refill    Skin is intact without sores or ulcers         Assessment:         E11.9   Type 2 diabetes mellitus without complications       E78.2   Mixed hyperlipidemia       I10   Essential (primary) hypertension       M54.17   Radiculopathy, lumbosacral region       M48.062   Spinal stenosis, lumbar region with neurogenic claudication       M81.0   Age-related osteoporosis without current pathological fracture           ORDERS:         Meds Prescribed:       [New Rx] alendronate 70 mg oral tablet [take 1 po once weekly in the morning, at least 30 min before first food, beverage, or medication of day and do not lie down for 30 min], #12 (twelve) tablets, Refills: 3 (three)         Lab Orders:       [id number]  DIAB2 - [facility] CMP A1C LIPID AND MICRO ALBUM CR RATIO:  [id number]  (Send-Out)              Procedures Ordered:       REFER  Referral to Specialist or Other Facility  (Send-Out)              Other Orders:       2028F  Foot examination performed (includes examination through visual inspection, sensory exam with monofilament, and pulse exam - report when any of the three components are completed) (DM)4  (In-House)                      Plan:         Type 2 diabetes mellitus without complicationsWe will check lab today and predicate further medication changes based on those results    LABORATORY:  Labs ordered to be performed today include Diabetes Panel 2;CMP, A1C, Lipid, Microalbumin:Creatinine Ratio.            Orders:       [id number]  DIAB2 - [facility] CMP A1C LIPID AND MICRO ALBUM CR RATIO: [id number]  (Send-Out)              Mixed hyperlipidemiaWe will see what labs look like with the Repatha        Radiculopathy, lumbosacral regionWe will get her in see the neurosurgeon.  Continue with epidural injections as per pain management        Spinal stenosis, lumbar region with neurogenic claudication        REFERRALS:  Referral initiated to a neurosurgeon ( Dr. Maikol Nails ).  ONLY wants to see Dr Nails          Orders:       REFER  Referral to Specialist or Other Facility  (Send-Out)              Age-related osteoporosis without current pathological fractureWe will get her started on alendronate.  I described to her the dosing regimen necessary.          Prescriptions:       [New Rx] alendronate 70 mg oral tablet [take 1 po once weekly in the morning, at least 30 min before first food, beverage, or medication of day and do not lie down for 30 min], #12 (twelve) tablets, Refills: 3 (three)             Other Orders      2028F  Foot examination performed (includes examination through visual inspection, sensory exam with monofilament, and pulse exam - report when any of the three components are completed) (DM)4  (In-House)              Other  Patient Education Handouts:     Dragon transcription disclaimer:        Much of this encounter note is an electronic transcription/translation of spoken language to printed text.  The electronic translation of spoken language may permit erroneous, or at times, nonsensical words or phrases to be inadvertently transcribed.  Although I have reviewed the note for such errors, some may still exist.        Charge Capture:         Primary Diagnosis:     E11.9  Type 2 diabetes mellitus without complications           Orders:      [id number]  Office/outpatient visit; established patient, level 4  (In-House)              E78.2  Mixed hyperlipidemia     I10  Essential (primary) hypertension     M54.17  Radiculopathy, lumbosacral region     M48.062  Spinal stenosis, lumbar region with neurogenic claudication     M81.0  Age-related osteoporosis without current pathological fracture         Other Orders:       2028F  Foot examination performed (includes examination through visual inspection, sensory exam with monofilament, and pulse exam - report when any of the three components are completed) (DM)4  (In-House)

## 2021-05-18 NOTE — PROGRESS NOTES
Kelsy Rider  1950     Office/Outpatient Visit    Visit Date: Tue, Dec 3, 2019 02:24 pm    Provider: Jonnie Zuñiga MD (Assistant: Kathleen Lopez LPN)    Location: [facility]        Electronically signed by Jonnie Zuñiga MD on  12/04/2019 07:05:39 AM                             Subjective:        CC: Medrol dose pack is finished.        Gabapentin 300mg TID    Tramadol 50mg- TID PRN pain pt is only taking 1/2 pill a dayMrvern Rider is a 69 year old White female.  pt is here today for pain in her legs. she states she's been here numerous times for this. went to the ER Saturday for this pain, was given Tramadol 50mg.;         HPI:           Ms. Rider that she went to [employer] around [facility] and was in so much pain he ended up crying most of the day Thanksving.  Walking seem to make her pain worse.  Pain radiating into both buttocks and down to both legs but worse so on the left side.  No loss of control of bowel or bladder.  When they returned from this location [place] she ended up going to [facility] emergency room where she was given prescription for some tramadol.  She says the tramadol makes her nauseated and does not really help that much with her pain.  In the past few weeks she has been to the chiropractor on occasion seem to be worse afterward, but in the case she did feel better for a day or 2.She did have an MRI of her LS spine and January which showed multilevel degenerative changes and high-grade central canal stenosis subsequently saw Dr. Hernandez neurosurgeon.She then went to pain management at [facility] with little benefit.She says that her pain is a sensation of burning and needles that are very intense.She does also have diabetes mellitus and we discussed possible contribution from neuropathy but symptoms seem more compatible with radiculopathy and spinal stenosis.    ROS:     CONSTITUTIONAL:  Negative for chills, fatigue, fever, and weight change.       EYES:  Negative for blurred vision.      CARDIOVASCULAR:  Negative for chest pain, orthopnea, paroxysmal nocturnal dyspnea and pedal edema.      RESPIRATORY:  Negative for dyspnea.      GASTROINTESTINAL:  Negative for abdominal pain, constipation, diarrhea, nausea and vomiting.      GENITOURINARY:  Negative for dysuria and frequent urination.      NEUROLOGICAL:  Negative for dizziness, headaches, paresthesias, and weakness.      PSYCHIATRIC:  Negative for anxiety, depression, and sleep disturbances.          Past Medical History / Family History / Social History:         Last Reviewed on 10/14/2019 01:20 PM by Jonnie Zuñiga    Past Medical History:                 PAST MEDICAL HISTORY         Hyperlipidemia     Hypothyroidism         PREVENTIVE HEALTH MAINTENANCE             BONE DENSITY: was last done 11/3/15 with the following abnormality noted-- osteopenia     COLORECTAL CANCER SCREENING: Up to date (colonoscopy q10y; sigmoidoscopy q5y; Cologuard q3y) was last done 2015, Results are in chart     EYE EXAM: Diabetic Eye Exam during this calendar year and results are in chart was last done 02/21/2018     MAMMOGRAM: was last done 11/3/15 with normal results         PAST MEDICAL HISTORY         Positive for    Stress test 10/2019- Flaget;         CURRENT MEDICAL PROVIDERS:    Cardiologist: Wagner    Dermatologist: Tacho    General Surgeon: Dr. Calixto    Ophthalmologist: Dr. Aranda    Orthopedist: Dr. Mumtaz Mathis    Pulmonologist: Vaibhav Singleton ([place] pul[facility])         Surgical History:         Cholecystectomy    Coronary Artery Stent Placement: 2/13/12;     Tubal Ligation     Breast Bx- Left- Negative; 2015     Epidural Injections;         Family History:     Father: Myocardial Infarction     Mother: Lung Cancer     Brother(s): Type 2 Diabetes     Son(s): Type 1 Diabetes     Daughter(s): Hypothyroidism         Social History:     Occupation: Retired (Prior occupation: [employer])     Marital Status:   "    Children: 3 children         Tobacco/Alcohol/Supplements:     Last Reviewed on 10/14/2019 12:45 PM by Spurling, Sarah C    Tobacco: She has never smoked.          Allergies:     Last Reviewed on 10/14/2019 12:45 PM by Spurling, Sarah C    Plavix:          Current Medications:     Last Reviewed on 10/14/2019 12:47 PM by Spurling, Sarah C    aspirin 81 mg oral tablet, delayed release (enteric coated) [1 tab daily]    Caltrate-600 Plus Vitamin D3 600 mg(1,500mg) -400 unit oral tablet [Take 1 tablet(s) by mouth daily]    Synthroid 0.1mg Tablet [Take 1 tablet(s) by mouth daily]    Atenolol/Chlorthalidone 50mg/25mg Tablet [Take 1/2  tablet by mouth BID]    Glucophage 850mg Tablet [Take 1 tablet(s) by mouth daily]    Glucose Reagent Blood Test Strips  Reagent Strips [check blood sugar up to TID with accuc chek shannon plus DX E11.9]    Lisinopril 40 mg oral tablet [Take 1 tablet(s) by mouth daily]    Lantus SoloSTAR 100units/1ml Injection [70 UNITS QDAY]    NovoLOG Flexpen U-100 Insulin 100 unit/mL (3 mL) subcutaneous Insulin Pen [take 4U in am, 14 U with noon meal and 6 U with evening along withsliding scale max daily 40 units DX E11.9]    BD Ultra-Fine Ita Pen Needle 32G x 5/32\"  Pen Needle [use as directed with victoza, lantus, & novolog Dx E11.9]    Victoza 2-Osorio 18mg/3ml Injection [Inject 1.2 mg daily]    atorvastatin 20 mg oral tablet [Take 1 tablet(s) by mouth daily]    Famotidine 20 mg oral tablet [1 tab daily ]    Medrol (Osorio) 4 mg oral Tablet, Dose Pack [take as directed]        Objective:        Vitals:         Current: 12/3/2019 2:31:27 PM    Ht:  5 ft, 2.25 in;  Wt: 178.2 lbs;  BMI: 32.3T: 97.6 F (oral);  BP: 144/68 mm Hg (left arm, sitting);  P: 65 bpm (left arm (BP Cuff), sitting);  sCr: 0.83 mg/dL;  GFR: 68.19        Repeat:     2:33:38 PM  BP:   159/74mm Hg (left arm, sitting) 2:33:50 PM  P:   71bpm (left arm (BP Cuff), sitting)     Exams:     PHYSICAL EXAM:     GENERAL: well developed, well nourished;  " well groomed;  no apparent distress;     EYES: nonicteric;     NECK:  supple, full ROM; no thyromegaly; no carotid bruits;     RESPIRATORY: normal appearance and symmetric expansion of chest wall; normal respiratory rate and pattern with no distress; normal breath sounds with no rales, rhonchi, wheezes or rubs;     CARDIOVASCULAR: normal rate; rhythm is regular;  no systolic murmur;     LYMPHATIC: no enlargement of cervical or facial nodes; no supraclavicular nodes;     MUSCULOSKELETAL: no pedal edema; Forward bending is slow but able to get to 90 degrees, however standing back erect  is painful.  No tenderness to palpation on paraspinous muscles of the lumbar region.      NEUROLOGIC: no focal lateralizing deficits.; Deep tendon reflexes patella and Achilles bilaterally are symmetrical.  There is no clonus.    PSYCHIATRIC:  appropriate affect and demeanor; normal speech pattern; grossly normal memory;         Assessment:         M48.062   Spinal stenosis, lumbar region with neurogenic claudication       M54.17   Radiculopathy, lumbosacral region       E11.9   Type 2 diabetes mellitus without complications       I10   Essential (primary) hypertension           ORDERS:         Meds Prescribed:       [New Rx] Percocet 5-325 mg oral tablet [take 1 tablet by oral route every 6 hours as needed for pain], #30 (thirty) tablets, Refills: 0 (zero)         Procedures Ordered:       REFER  Referral to Specialist or Other Facility  (Send-Out)                      Plan:         Spinal stenosis, lumbar region with neurogenic claudicationWe will get her back into see the neurosurgeon.  She would like to see someone different for second opinion.  She is not interested in epidural injections since they were not that beneficial.In the meantime we will make the following medication changes.Increase gabapentin to 2 po BID.  Stop tramadol.  Begin Percocet .        REFERRALS:  Referral initiated to [facility].             Prescriptions:       [New Rx] Percocet 5-325 mg oral tablet [take 1 tablet by oral route every 6 hours as needed for pain], #30 (thirty) tablets, Refills: 0 (zero)           Orders:       REFER  Referral to Specialist or Other Facility  (Send-Out)              Radiculopathy, lumbosacral regionas above        Essential (primary) hypertensionHer blood pressure is noted to be elevated but she is in pain.  I will make any changes to her medications today.  She is got a follow-up just in a couple weeks we will see how she is doing at that point            Patient Recommendations:        For  Spinal stenosis, lumbar region with neurogenic claudication:    I also recommend [facility].              Charge Capture:         Primary Diagnosis:     M48.062  Spinal stenosis, lumbar region with neurogenic claudication           Orders:      [id number]  Office/outpatient visit; established patient, level 4  (In-House)              M54.17  Radiculopathy, lumbosacral region     E11.9  Type 2 diabetes mellitus without complications     I10  Essential (primary) hypertension

## 2021-05-18 NOTE — PROGRESS NOTES
Kelsy Rider 1950     Office/Outpatient Visit    Visit Date: Mon, Jul 15, 2019 01:26 pm    Provider: Jonnie Zuñiga MD (Assistant: Shira Philippe MA)    Location: [facility]        Electronically signed by Jonnie Zuñiga MD on  07/16/2019 03:58:58 PM                             SUBJECTIVE:        CC: (PT STOPPED GABAPENTIN, DUE TO HOW IT MADE HER FEEL)     Mrs. Rider is a 69 year old White female.  She presents with pain in right low side & issues with BM. Urinary urgency.          HPI: Kelsy has been continuing to have some back pain.  She went to a chiropractor and thought she has some relief, but pain then returned. Then she started experiencing some constipation had no bowel movement at all Sunday Monday or Tuesday.Then just small bowel movements for a couple of days after that.  This of course added to her discomfort.  Her chiropractor thought that constipation might be related to her back issues. She is had a long history of constipation in the past and was on MiraLAX but stopped it because she was doing okay. She has also noted a little bit of urinary urgency/frequency but not much in the way of dysuria.She does of course have diabetes but her sugars have been okay. So one has to wonder may be she is got possible infection contributing to the back pain         In regard to the screening for depression,         PHQ-9 Depression Screening: Completed form scanned and in chart; Total Score 3 Alcohol Consumption Screening: Completed form scanned and in chart; Total Score 0     ROS:     CONSTITUTIONAL:  Negative for chills, fatigue, fever, and weight change.      EYES:  Negative for blurred vision.      CARDIOVASCULAR:  Negative for chest pain, orthopnea, paroxysmal nocturnal dyspnea and pedal edema.      RESPIRATORY:  Negative for dyspnea.      GASTROINTESTINAL:  Negative for hematemesis and hematochezia.      GENITOURINARY:  Negative for dysuria and hematuria.      NEUROLOGICAL:   Negative for dizziness, headaches, paresthesias, and weakness.      PSYCHIATRIC:  Negative for anxiety, depression, and sleep disturbances.          PMH/FMH/SH:     Last Reviewed on 6/13/2019 11:18 AM by Jonnie Zuñiga    Past Medical History:                 PAST MEDICAL HISTORY         Hyperlipidemia     Hypothyroidism         PREVENTIVE HEALTH MAINTENANCE             BONE DENSITY: was last done 11/3/15 with the following abnormality noted-- osteopenia     COLORECTAL CANCER SCREENING: Up to date (colonoscopy q10y; sigmoidoscopy q5y; Cologuard q3y) was last done 2015, Results are in chart     EYE EXAM: Diabetic Eye Exam during this calendar year and results are in chart was last done 02/21/2018     MAMMOGRAM: was last done 11/3/15 with normal results         PAST MEDICAL HISTORY             CURRENT MEDICAL PROVIDERS:    Cardiologist: Wagner    Dermatologist: Tacho    General Surgeon: Dr. Calixto    Ophthalmologist: Dr. Aranda    Orthopedist: Dr. Mumtaz Mathis    Pulmonologist: Vaibhav Singleton ([place] pul[facility])         Surgical History:         Cholecystectomy    Coronary Artery Stent Placement: 2/13/12;     Tubal Ligation      Breast Bx- Left- Negative;     2015     Epidural Injections;         Family History:     Father: Myocardial Infarction     Mother: Lung Cancer     Brother(s): Type 2 Diabetes     Son(s): Type 1 Diabetes     Daughter(s): Hypothyroidism         Social History:     Occupation: Retired (Prior occupation: [employer])     Marital Status:      Children: 3 children         Tobacco/Alcohol/Supplements:     Last Reviewed on 6/13/2019 10:43 AM by Spurling, Sarah C    Tobacco: She has never smoked.              Allergies:     Last Reviewed on 6/13/2019 10:43 AM by Spurling, Sarah C    Plavix:        Current Medications:     Last Reviewed on 6/13/2019 10:43 AM by Spurling, Sarah C    NovoLog FlexPen 100units/1ml Injection take 4U in am, 14 U with noon meal and 6 U with evening along withsliding  scale max daily 40 units DX E11.9     Atorvastatin Calcium 20mg Tablet Take 1 tablet(s) by mouth daily     Lisinopril 40mg Tablet Take 1 tablet(s) by mouth daily     Atenolol/Chlorthalidone 50mg/25mg Tablet Take 1/2  tablet by mouth BID     Glucophage 850mg Tablet Take 1 tablet(s) by mouth daily     Synthroid 0.1mg Tablet Take 1 tablet(s) by mouth daily     Lantus SoloSTAR 100units/1ml Injection 70 UNITS QDAY     Glucose Reagent Blood Test Strips  Reagent Strips check blood sugar up to TID with accuc chek shannon plus DX E11.9     Victoza 2-Osorio 18mg/3ml Injection Inject 1.2 mg daily     Gabapentin 300mg Capsules Take 1 capsule(s) by mouth qday for 1 week then increase to 1 capsule(s) BID for second week, then tid     Aspirin (ASA) 81mg Tablets, Enteric Coated 1 tab daily     Caltrate 600 + D Tablet Take 1 tablet(s) by mouth daily         OBJECTIVE:        Vitals:         Current: 7/15/2019 1:41:17 PM    Ht:  5 ft, 2.25 in;  Wt: 175 lbs;  BMI: 31.8    T: 98.1 F (oral);  BP: 127/55 mm Hg (right arm, sitting);  P: 82 bpm (right arm (BP Cuff), sitting);  sCr: 0.87 mg/dL;  GFR: 64.55        Exams:     PHYSICAL EXAM:     GENERAL: well developed, well nourished;  well groomed;  no apparent distress;     EYES: nonicteric;     NECK:  supple, full ROM; no thyromegaly; no carotid bruits;     RESPIRATORY: normal appearance and symmetric expansion of chest wall; normal respiratory rate and pattern with no distress; normal breath sounds with no rales, rhonchi, wheezes or rubs;     CARDIOVASCULAR: normal rate; rhythm is regular;  no systolic murmur;     GASTROINTESTINAL: nontender, nondistended; no hepatosplenomegaly or masses; no bruits;     LYMPHATIC: no enlargement of cervical or facial nodes; no supraclavicular nodes;     MUSCULOSKELETAL: no pedal edema;   Internal and external rotation of the hip without pain.  No pain with pelvic rock     NEUROLOGIC: no focal lateralizing deficits.; DTRs symmetrical bilaterally at patella and  Achilles.  Dorsiflexion great toe symmetrical.  Straight leg raise negative.     PSYCHIATRIC:  appropriate affect and demeanor; normal speech pattern; grossly normal memory;         ASSESSMENT           722.10   M54.17  Lumbar radiculopathy              DDx:     564.01   K59.00  Constipation              DDx:     788.63   R39.15  Urgency of urination              DDx:     250.00   E11.9  Type 2 diabetes              DDx:     V79.0   Z13.31  Screening for depression              DDx:         ORDERS:         Lab Orders:       [id number]  [facility] - [facility] Urinalysis, automated, with micro  (Send-Out; Stat)           Other Orders:         Depression screen negative  (In-House)                   PLAN:          Lumbar radiculopathy Take the gabapentin as prescribed.  If she wants to see the chiropractor that is fine.  Also suggests consideration of physical therapy.          Constipation Think if we can get her constipation under better control she will fill better as well.  So wanted to take MiraLAX on a daily basis.  She should report back in 1 week if that is been effective or not.          Urgency of urination           Orders:       [id number]  [facility] - [facility] Urinalysis, automated, with micro  (Send-Out; Stat)            Screening for depression     MIPS PHQ-9 Depression Screening: Completed form scanned and in chart; Total Score 3; Negative Depression Screen           Orders:         Depression screen negative  (In-House)               CHARGE CAPTURE           **Please note: ICD descriptions below are intended for billing purposes only and may not represent clinical diagnoses**        Primary Diagnosis:         722.10 Lumbar radiculopathy            M54.17    Radiculopathy, lumbosacral region              Orders:          [id number]   Office/outpatient visit; established patient, level 3  (In-House)           564.01 Constipation            K59.00    Constipation, unspecified    788.63 Urgency of urination            R39.15     Urgency of urination    250.00 Type 2 diabetes            E11.9    Type 2 diabetes mellitus without complications    V79.0 Screening for depression            Z13.31    Encounter for screening for depression              Orders:             Depression screen negative  (In-House)

## 2021-05-18 NOTE — PROGRESS NOTES
Kelsy Rider 1950     Office/Outpatient Visit    Visit Date: Mon, Oct 14, 2019 12:42 pm    Provider: Jonnie Zuñiga MD (Assistant: Sarah Spurling, MA)    Location: [facility]        Electronically signed by Jonnie Zuñiga MD on  10/14/2019 05:10:41 PM                             SUBJECTIVE:        CC:     Mrs. Rider is a 69 year old White female.  She is here today following a transition of care from an inpatient hospital: [facility]. The patient was admitted on 10/3/19 to 10-4-19 for chest pains.. Our office called the patient within 48 hours of discharge and scheduled the follow-up appointment..          HPI:     CBD for leg pain working in flowers, took ntg then sick/like pass out. same in past     Kelsy is here today follow-up on her recent hospitalization for chest pain.  She had cardiac work-up and myocardial infarction was ruled out.  She had a stress test that was unremarkable.  The hospitalist told her that he suspect it may be dyspepsia and discharged her with Pepcid.  Since being home she is not been symptomatic.  She relates to me that the initiation of her symptoms she had been out working in the flowers started to have a little chest discomfort felt like her bra was on too tight.  Took a half of the nitroglycerin did not improve took a second half of nitroglycerin and then became nauseated diaphoretic lightheaded.  Ended up having to lie on the floor.  Granddaughter had to call for assistance to get her transported to the emergency room.  She does state that once before she took nitroglycerin and got syncopal.     She also tells me that she is been having significant discomfort in the right leg related to her lumbar radiculopathy.  She admits that she started taking CBD oil and thinks that it has been helpful.  Now she does not know if she should stay on the gabapentin or the CBD or both         In regard to the hypothyroidism, she denies any related symptoms.  She reports no  symptoms suggestive of adverse medication effect.          With regard to the mixed hyperlipidemia, compliance with treatment has been good.  She specifically denies associated symptoms, including muscle pain and weakness.          Additionally, she presents with history of hypertension.  she did not bring her blood pressure diary, but says that pressures have been okay.  She is tolerating the medication well without side effects.  Compliance with treatment has been good.          Type 2 diabetes details; compliance with treatment has been good.  She specifically denies associated symptoms, including nocturia, polydipsia and polyuria.  She reports home blood glucose readings have been fairly good, with average fasting glucoses running in the 120-150 mg/dL range. She checks her glucose 1 to 2 times daily.  She says while she was in the hospital her sugars can get a little out of whack as they did not administer her insulin weight was prescribed.     ROS:     CONSTITUTIONAL:  Negative for chills, fatigue, fever, and weight change.      EYES:  Negative for blurred vision.      CARDIOVASCULAR:  Negative for chest pain, orthopnea, paroxysmal nocturnal dyspnea and pedal edema.      RESPIRATORY:  Negative for dyspnea.      GASTROINTESTINAL:  Negative for abdominal pain, constipation, diarrhea, nausea and vomiting.      GENITOURINARY:  Negative for dysuria and frequent urination.      NEUROLOGICAL:  Negative for dizziness, headaches, paresthesias, and weakness.      PSYCHIATRIC:  Negative for anxiety, depression, and sleep disturbances.          PMH/FMH/SH:     Last Reviewed on 10/14/2019 01:20 PM by Jonnie Zuñiga    Past Medical History:                 PAST MEDICAL HISTORY         Hyperlipidemia     Hypothyroidism         PREVENTIVE HEALTH MAINTENANCE             BONE DENSITY: was last done 11/3/15 with the following abnormality noted-- osteopenia     COLORECTAL CANCER SCREENING: Up to date (colonoscopy q10y;  sigmoidoscopy q5y; Cologuard q3y) was last done 2015, Results are in chart     EYE EXAM: Diabetic Eye Exam during this calendar year and results are in chart was last done 02/21/2018     MAMMOGRAM: was last done 11/3/15 with normal results         PAST MEDICAL HISTORY         Positive for    Stress test 10/2019- [facility];         CURRENT MEDICAL PROVIDERS:    Cardiologist: Wagner    Dermatologist: Tacho    General Surgeon: Dr. Calixto    Ophthalmologist: Dr. Aranda    Orthopedist: Dr. Mumtaz Mathis    Pulmonologist: Vaibhav Singleton ([place] [facility])         Surgical History:         Cholecystectomy    Coronary Artery Stent Placement: 2/13/12;     Tubal Ligation      Breast Bx- Left- Negative;     2015     Epidural Injections;         Family History:     Father: Myocardial Infarction     Mother: Lung Cancer     Brother(s): Type 2 Diabetes     Son(s): Type 1 Diabetes     Daughter(s): Hypothyroidism         Social History:     Occupation: Retired (Prior occupation: [employer])     Marital Status:      Children: 3 children         Tobacco/Alcohol/Supplements:     Last Reviewed on 10/14/2019 12:45 PM by Spurling, Sarah C    Tobacco: She has never smoked.              Allergies:     Last Reviewed on 10/14/2019 12:45 PM by Spurling, Sarah C    Plavix:        Current Medications:     Last Reviewed on 10/14/2019 12:47 PM by Spurling, Sarah C    Atenolol/Chlorthalidone 50mg/25mg Tablet Take 1/2  tablet by mouth BID     Glucophage 850mg Tablet Take 1 tablet(s) by mouth daily     Lisinopril 40mg Tablet Take 1 tablet(s) by mouth daily     Synthroid 0.1mg Tablet Take 1 tablet(s) by mouth daily     Lantus SoloSTAR 100units/1ml Injection 70 UNITS QDAY     Glucose Reagent Blood Test Strips  Reagent Strips check blood sugar up to TID with accuc chek shannon plus DX E11.9     Victoza 2-Osorio 18mg/3ml Injection Inject 1.2 mg daily     NovoLog FlexPen 100units/1ml Injection take 4U in am, 14 U with noon meal and 6 U with evening along  withsliding scale max daily 40 units DX E11.9     Atorvastatin Calcium 20mg Tablet Take 1 tablet(s) by mouth daily     Aspirin (ASA) 81mg Tablets, Enteric Coated 1 tab daily     Caltrate 600 + D Tablet Take 1 tablet(s) by mouth daily     Gabapentin 300mg Capsules Take 1 capsule TID     Famotidine 20mg Tablet 1 tab daily         OBJECTIVE:        Vitals:         Current: 10/14/2019 12:49:19 PM    Ht:  5 ft, 2.25 in;  Wt: 173.6 lbs;  BMI: 31.5    T: 98.1 F (oral);  BP: 123/61 mm Hg (left arm, sitting);  P: 70 bpm (left arm (BP Cuff), sitting);  sCr: 0.87 mg/dL;  GFR: 64.33        Exams:     PHYSICAL EXAM:     GENERAL: well developed, well nourished;  well groomed;  no apparent distress;     EYES: nonicteric;     E/N/T: EARS:  normal external auditory canals and tympanic membranes;  grossly normal hearing; OROPHARYNX:  normal mucosa, dentition, gingiva, and posterior pharynx;     NECK:  supple, full ROM; no thyromegaly; no carotid bruits;     RESPIRATORY: normal appearance and symmetric expansion of chest wall; normal respiratory rate and pattern with no distress; normal breath sounds with no rales, rhonchi, wheezes or rubs;     CARDIOVASCULAR: normal rate; rhythm is regular;  no systolic murmur;     GASTROINTESTINAL: nontender, nondistended; no hepatosplenomegaly or masses; no bruits;     GENITOURINARY: No CVA tenderness;     LYMPHATIC: no enlargement of cervical or facial nodes; no supraclavicular nodes;     MUSCULOSKELETAL: no pedal edema;     NEUROLOGIC: no focal lateralizing deficits.;     PSYCHIATRIC:  appropriate affect and demeanor; normal speech pattern; grossly normal memory;         ASSESSMENT           786.59   R07.9  Atypical chest pain              DDx:     536.8   K30  Dyspepsia              DDx:     722.10   M54.17  Lumbar radiculopathy              DDx:     244.9   E03.9  Hypothyroidism              DDx:     272.2   E78.2  Mixed hyperlipidemia              DDx:     401.1   I10  Hypertension               DDx:     250.00   E11.9  Type 2 diabetes              DDx:         ORDERS:         Meds Prescribed:       Refill of: Atorvastatin Calcium 20mg Tablet Take 1 tablet(s) by mouth daily  #90 (Ninety) tablet(s) Refills: 0         Lab Orders:       [id number]  DIAB1 - [facility] LIPID,CMP, A1C: 38695, 51720, 04221  (Send-Out)         [id number]  BDCB - [facility] CBC with 3 part diff  (Send-Out)         16424  TSH - [facility] TSH  (Send-Out)                   PLAN:          Atypical chest pain Keep her follow-up appointment with cardiology.  Sounds like her cardiac work-up was negative and hospitalist could be correct that her symptoms are related to dyspepsia     LABORATORY:  Labs ordered to be performed today include CBC.            Orders:       [id number]  [facility] CBC with 3 part diff  (Send-Out)            Dyspepsia Continue with the Pepcid for at least a month and then she can see how symptoms are.  If she needs to stay on it chronically that would be fine.          Lumbar radiculopathy I told her that I was unable to give definitive advice regarding the CBD oil as controlled studies have not really been performed.  She is more or less on her own in regards to judging effectiveness.  If she wants to wean the CBD and see if her symptoms worsen that would be reasonable.  Then if symptoms do worsen she could get back on CBD and try weaning the gabapentin to see if symptoms worsen. Again informed her I could not vouch for the safety and long-term effects of CBD oil exposure.          Hypothyroidism     LABORATORY:  Labs ordered to be performed today include TSH.            Orders:       [id number]  TSH - [facility] TSH  (Send-Out)            Mixed hyperlipidemia           Prescriptions:       Refill of: Atorvastatin Calcium 20mg Tablet Take 1 tablet(s) by mouth daily  #90 (Ninety) tablet(s) Refills: 0          Hypertension Stay on current medication          Type 2 diabetes     LABORATORY:  Labs ordered to be performed today include Diabetes Panel 1; CMP,  Lipid, A1C.            Orders:       [id number]  [address] [place] [address] LIPID,CMP, A1C: 00400, 29565, 79937  (Send-Out)               Patient Recommendations:    Dragon transcription disclaimer:        Much of this encounter note is an electronic transcription/translation of spoken language to printed text.  The electronic translation of spoken language may permit erroneous, or at times, nonsensical words or phrases to be inadvertently transcribed.  Although I have reviewed the note for such errors, some may still exist.             CHARGE CAPTURE           **Please note: ICD descriptions below are intended for billing purposes only and may not represent clinical diagnoses**        Primary Diagnosis:         786.59 Atypical chest pain            R07.9    Chest pain, unspecified              Orders:          [id number]   Transitional care manage service 14 day discharge  (In-House)           536.8 Dyspepsia            K30    Functional dyspepsia    722.10 Lumbar radiculopathy            M54.17    Radiculopathy, lumbosacral region    244.9 Hypothyroidism            E03.9    Hypothyroidism, unspecified    272.2 Mixed hyperlipidemia            E78.2    Mixed hyperlipidemia    401.1 Hypertension            I10    Essential (primary) hypertension    250.00 Type 2 diabetes            E11.9    Type 2 diabetes mellitus without complications

## 2021-05-18 NOTE — PROGRESS NOTES
Kelsy Rider 1950     Office/Outpatient Visit    Visit Date: Tue, Jun 5, 2018 09:49 am    Provider: Jonnie Zuñiga MD (Assistant: Jennifer Gaitan MA)    Location: [facility]        Electronically signed by Jonnie Zuñiga MD on  06/05/2018 04:32:47 PM                             SUBJECTIVE:        CC:     Mrs. Rider is a 68 year old White female.  Patient is here for 6 month check up and medication refills.;         HPI: With regard to hypothyroidism, she denies fatigue, constipation, and weight gain and is tolerating medications well.         Patient to be evaluated for mixed hyperlipidemia.  Compliance with treatment has been good.  She specifically denies associated symptoms, including muscle pain and weakness.          With regard to the hypertension, she did not bring her blood pressure diary, but says that pressures have been okay.  She is tolerating the medication well without side effects.  Compliance with treatment has been good.          Type 2 diabetes details; compliance with treatment has been good.  She specifically denies associated symptoms, including nocturia, polydipsia and polyuria.  She reports home blood glucose readings have averaged fasting readings in the  mg/dL range. She checks her glucose 3 to 4 times daily.  She had an eye exam in March 2018 which showed no retinopathy and mild cataracts in L eye.         Due for breast cancer screening.  She does SBE and is without concern from her own exams.      ROS:     CONSTITUTIONAL:  Negative for chills, fatigue, fever, and weight change.      EYES:  Negative for blurred vision.      CARDIOVASCULAR:  Negative for chest pain, orthopnea, paroxysmal nocturnal dyspnea and pedal edema.      RESPIRATORY:  Negative for dyspnea.      GASTROINTESTINAL:  Negative for abdominal pain, constipation, diarrhea, nausea and vomiting.      GENITOURINARY:  Negative for dysuria and frequent urination.      NEUROLOGICAL:  Negative for  dizziness, headaches, paresthesias, and weakness.      PSYCHIATRIC:  Negative for anxiety, depression, and sleep disturbances.          PMH/FMH/SH:     Last Reviewed on 3/07/2013 09:43 AM by Jonnie Zuñiga    Past Medical History:                 PAST MEDICAL HISTORY         Hyperlipidemia     Hypothyroidism         PREVENTIVE HEALTH MAINTENANCE             COLONOSCOPY: Done within the last 10 years was last done 2015     MAMMOGRAM: was last done 11/3/15 with normal results     BONE DENSITY: was last done 11/3/15 with the following abnormality noted-- osteopenia     PNEUMOCOCCAL 23 VACCINE: was last done 8/10/10     Prevnar 13: was last done 11/12/15     INFLUENZA VACCINE: was last done 10/2016     SHINGLES VACCINE: was last done 11/14/13     Tdap VACCINE: was last done 11/14/13         PAST MEDICAL HISTORY             CURRENT MEDICAL PROVIDERS:    Cardiologist: Wagner    Dermatologist: Tacho    General Surgeon: Dr. Calixto    Ophthalmologist: Dr. Aranda    Orthopedist: Dr. Mumtaz Mathis    Pulmonologist: Vaibhav Singleton ([place] [facility])         Surgical History:         Cholecystectomy    Coronary Artery Stent Placement: 2/13/12;     Tubal Ligation      Breast Bx- Left- Negative;     Procedures: colonoscopy 2005 2015     Epidural Injections;         Family History:     Father: Myocardial Infarction     Mother: Lung Cancer     Brother(s): Type 2 Diabetes     Son(s): Type 1 Diabetes     Daughter(s): Hypothyroidism         Social History:     Occupation: Retired (Prior occupation: [employer])     Marital Status:      Children: 3 children         Tobacco/Alcohol/Supplements:     Last Reviewed on 6/05/2018 09:52 AM by Jennifer Gaitan    Tobacco: She has never smoked.              Allergies:     Last Reviewed on 6/05/2018 09:50 AM by Jennifer Gaitan    Plavix:        Current Medications:     Last Reviewed on 6/05/2018 09:53 AM by Jennifer Gaitan    Lantus SoloSTAR 100units/1ml Injection 70 UNITS QDAY      "Glucose Reagent Blood Test Strips  Reagent Strips check blood sugar up to TID with accuc chek shannon plus DX E11.9     Victoza 2-Osorio 18mg/3ml Injection Inject 1.2 mg daily     Atorvastatin Calcium 20mg Tablet Take 1 tablet(s) by mouth daily     Lisinopril 40mg Tablet Take 1 tablet(s) by mouth daily     Synthroid 0.1mg Tablet Take 1 tablet(s) by mouth daily     NovoLog FlexPen 100units/1ml Injection take 4U in am, 14 U with noon meal and 6 U with evening along withsliding scale max daily 40 units DX E11.9     Atenolol/Chlorthalidone 50mg/25mg Tablet Take 1/2  tablet by mouth BID     Glucophage 850mg Tablet Take 1 tablet(s) by mouth daily     Aspirin (ASA) 81mg Tablets, Enteric Coated 1 tab daily     BD Ultra-Fine Ita Pen Needle 32G x 5/32\"  Pen Needle use as directed     Caltrate 600 + D Tablet Take 1 tablet(s) by mouth daily         OBJECTIVE:        Vitals:         Current: 6/5/2018 9:52:06 AM    Ht:  5 ft, 2.25 in;  Wt: 179.3 lbs;  BMI: 32.5    T: 97.9 F (oral);  BP: 95/56 mm Hg (left arm, sitting);  P: 84 bpm (left arm (BP Cuff), sitting);  sCr: 0.97 mg/dL;  GFR: 59.28        Repeat:     11:00:06 AM     BP:   110/64mm Hg        Exams:     PHYSICAL EXAM:     GENERAL:  well developed and nourished; appropriately groomed; in no apparent distress;     EYES: PERRL, EOMI     E/N/T: oropharynx w/o erythema or edema     NECK: thyroid exam reveals no discrete nodules;  carotid exam is normal with good upstroke and no bruits;     RESPIRATORY: CTA B WITHOUT WHEEZING/RALES OR RHONCHI, NORMAL RESP. EFFORT     CARDIOVASCULAR: normal rate; rhythm is regular;     GASTROINTESTINAL: nontender;     LYMPHATIC: no cervical lymphadenopathy;     BREAST/INTEGUMENT: BREASTS: breast exam is normal without masses, skin changes, or nipple discharge; no nipple discharge, no skin retractions, no masses palpated;     NEUROLOGIC: GROSSLY INTACT     PSYCHIATRIC:  appropriate affect and demeanor; normal speech pattern; grossly normal memory;     "     ASSESSMENT           272.2   E78.2  Mixed hyperlipidemia              DDx:     401.1   I10  Hypertension              DDx:     250.00   E11.9  Type 2 diabetes              DDx:     V76.10   Z12.39  Screening for breast cancer, unspecified              DDx:         ORDERS:         Radiology/Test Orders:       [id number]  Screening mammography bi 2-view inc CAD  (Send-Out)           Lab Orders:       [id number]  DIAB2 - [facility] CMP A1C LIPID AND MICRO ALBUM CR RATIO: [id number]  (Send-Out)                   PLAN:          Mixed hyperlipidemia cont rx, ck lab          Hypertension cont rx, if develops orthostatic symptoms we can lower dose of meds.  She is followed by Cardiology too.          Type 2 diabetes continue Rx and ck lab     LABORATORY:  Labs ordered to be performed today include Diabetes Panel 2;CMP, A1C, Lipid, Microalbumin:Creatinine Ratio.            Orders:       [id number]  DIAB2 - [facility] CMP A1C LIPID AND MICRO ALBUM CR RATIO: [id number]  (Send-Out)            Screening for breast cancer, unspecified         RADIOLOGY:  I have ordered screening mammogram to be done today.            Orders:       [id number]  Screening mammography bi 2-view inc CAD  (Send-Out)               CHARGE CAPTURE           **Please note: ICD descriptions below are intended for billing purposes only and may not represent clinical diagnoses**        Primary Diagnosis:         272.2 Mixed hyperlipidemia            E78.2    Mixed hyperlipidemia              Orders:          [id number]   Office/outpatient visit; established patient, level 4  (In-House)           401.1 Hypertension            I10    Essential (primary) hypertension    250.00 Type 2 diabetes            E11.9    Type 2 diabetes mellitus without complications    V76.10 Screening for breast cancer, unspecified            Z12.39    Encounter for other screening for malignant neoplasm of breast

## 2021-05-18 NOTE — PROGRESS NOTES
Kelsy Rider 1950     Office/Outpatient Visit    Visit Date: Thu, Jun 13, 2019 10:41 am    Provider: Jonnie Zuñiga MD (Assistant: Sarah Spurling, MA)    Location: [facility]        Electronically signed by Jonnie Zuñiga MD on  06/13/2019 11:52:17 AM                             SUBJECTIVE:        CC:     Mrs. Rider is a 69 year old White female.  This is a follow-up visit.  no longer taking gabapentin, still in the med list for you.;         HPI:         Patient presents with mixed hyperlipidemia.  Compliance with treatment has been good.  She specifically denies associated symptoms, including muscle pain and weakness.          With regard to the hypertension, Mrs. Rider does not check her blood pressure other than at her clinic appointments.  She is tolerating the medication well without side effects.  Compliance with treatment has been good.          Type 2 diabetes details; compliance with treatment has been good.  She specifically denies associated symptoms, including nocturia, polydipsia and polyuria.  She reports home blood glucose readings have been fairly good, with average fasting glucoses running in the 120-150 mg/dL range. She checks her glucose 2 to 3 times daily.      As far as her lumbar radiculopathy spinal stenosis issues she has seen the neurosurgeon, Dr. Hernandez, who suggested surgery but she is not ready for that she says.  She did go to pain management and has had 2 epidural injections.  Says they do help but only for a couple of weeks.  She is not sure she is going to continue to do those.     As far as her shoulder issues she says she is getting along okay.  We reviewed the last note from Dr. Mumtaz Mathis.  He anticipates that eventually she may need surgery again.     ROS:     CONSTITUTIONAL:  Negative for chills, fatigue, fever, and weight change.      EYES:  Negative for blurred vision.      CARDIOVASCULAR:  Negative for chest pain, orthopnea, paroxysmal nocturnal  dyspnea and pedal edema.      RESPIRATORY:  Negative for dyspnea.      GASTROINTESTINAL:  Negative for abdominal pain, constipation, diarrhea, nausea and vomiting.      GENITOURINARY:  Negative for dysuria and frequent urination.      NEUROLOGICAL:  Negative for dizziness, headaches, paresthesias, and weakness.      PSYCHIATRIC:  Negative for anxiety, depression, and sleep disturbances.          PMH/FMH/SH:     Last Reviewed on 6/13/2019 11:18 AM by Jonnie Zuñiga    Past Medical History:                 PAST MEDICAL HISTORY         Hyperlipidemia     Hypothyroidism         PREVENTIVE HEALTH MAINTENANCE             BONE DENSITY: was last done 11/3/15 with the following abnormality noted-- osteopenia     COLORECTAL CANCER SCREENING: Up to date (colonoscopy q10y; sigmoidoscopy q5y; Cologuard q3y) was last done 2015, Results are in chart     EYE EXAM: Diabetic Eye Exam during this calendar year and results are in chart was last done 02/21/2018     MAMMOGRAM: was last done 11/3/15 with normal results         PAST MEDICAL HISTORY             CURRENT MEDICAL PROVIDERS:    Cardiologist: Wagner    Dermatologist: Tacho    General Surgeon: Dr. Calixto    Ophthalmologist: Dr. Aranda    Orthopedist: Dr. Mumtaz Mathis    Pulmonologist: Vaibhav Singleton ([place] [facility])         Surgical History:         Cholecystectomy    Coronary Artery Stent Placement: 2/13/12;     Tubal Ligation      Breast Bx- Left- Negative;     2015     Epidural Injections;         Family History:     Father: Myocardial Infarction     Mother: Lung Cancer     Brother(s): Type 2 Diabetes     Son(s): Type 1 Diabetes     Daughter(s): Hypothyroidism         Social History:     Occupation: Retired (Prior occupation: [employer])     Marital Status:      Children: 3 children         Tobacco/Alcohol/Supplements:     Last Reviewed on 6/13/2019 10:43 AM by Spurling, Sarah C    Tobacco: She has never smoked.              Allergies:     Last Reviewed on 6/13/2019  10:43 AM by Spurling, Sarah C    Plavix:        Current Medications:     Last Reviewed on 6/13/2019 10:43 AM by Spurling, Sarah C    Lantus SoloSTAR 100units/1ml Injection 70 UNITS QDAY     Glucose Reagent Blood Test Strips  Reagent Strips check blood sugar up to TID with accuc chek shannon plus DX E11.9     Victoza 2-Osorio 18mg/3ml Injection Inject 1.2 mg daily     Atorvastatin Calcium 20mg Tablet Take 1 tablet(s) by mouth daily     Atenolol/Chlorthalidone 50mg/25mg Tablet Take 1/2  tablet by mouth BID     Lisinopril 40mg Tablet Take 1 tablet(s) by mouth daily     Glucophage 850mg Tablet Take 1 tablet(s) by mouth daily     Synthroid 0.1mg Tablet Take 1 tablet(s) by mouth daily     Gabapentin 300mg Capsules Take 1 capsule(s) by mouth qday for 1 week then increase to 1 capsule(s) BID for second week, then tid     NovoLog FlexPen 100units/1ml Injection take 4U in am, 14 U with noon meal and 6 U with evening along withsliding scale max daily 40 units DX E11.9     Aspirin (ASA) 81mg Tablets, Enteric Coated 1 tab daily     Caltrate 600 + D Tablet Take 1 tablet(s) by mouth daily         OBJECTIVE:        Vitals:         Current: 6/13/2019 10:51:48 AM    Ht:  5 ft, 2.25 in;  Wt: 174 lbs;  BMI: 31.6    T: 98.5 F (oral);  BP: 97/54 mm Hg (left arm, sitting);  P: 76 bpm (left arm (BP Cuff), sitting);  sCr: 0.91 mg/dL;  GFR: 61.57        Exams:     PHYSICAL EXAM:     GENERAL: well developed, well nourished;  well groomed;  no apparent distress;     EYES: nonicteric;     E/N/T: EARS:  normal external auditory canals and tympanic membranes;  grossly normal hearing; OROPHARYNX:  normal mucosa, dentition, gingiva, and posterior pharynx;     NECK:  supple, full ROM; no thyromegaly; no carotid bruits;     RESPIRATORY: normal appearance and symmetric expansion of chest wall; normal respiratory rate and pattern with no distress; normal breath sounds with no rales, rhonchi, wheezes or rubs;     CARDIOVASCULAR: normal rate; rhythm is  regular;  no systolic murmur;     LYMPHATIC: no enlargement of cervical or facial nodes; no supraclavicular nodes;     MUSCULOSKELETAL: no pedal edema; Right shoulder abduction to 90 degrees     NEUROLOGIC: no focal lateralizing deficits.;     PSYCHIATRIC:  appropriate affect and demeanor; normal speech pattern; grossly normal memory;     Left foot exam    Protective sensation using Monofilament test: NORMAL sensation. Patient detects .07 grams of force which is considered normal.    Vascular status: normal peripheral vascular exam with palpable dorsal pedal and posterior tibal pulses and brisk digital capillary refill    Skin is intact without sores or ulcers    Right foot exam    Protective sensation using Monofilament test: NORMAL sensation. Patient detects .07 grams of force which is considered normal.    Vascular status: normal peripheral vascular exam with palpable dorsal pedal and posterior tibal pulses and brisk digital capillary refill    Skin is intact without sores or ulcers         ASSESSMENT           272.2   E78.2  Mixed hyperlipidemia              DDx:     401.1   I10  Hypertension              DDx:     250.00   E11.9  Type 2 diabetes              DDx:     722.10   M54.17  Lumbar radiculopathy              DDx:     719.41   M25.511  Shoulder pain              DDx:         ORDERS:         Lab Orders:       [id number]  DIAB2 - [facility] CMP A1C LIPID AND MICRO ALBUM CR RATIO: [id number]  (Send-Out)           Other Orders:       [id number]  Foot examination performed (includes examination through visual inspection, sensory exam with monofi  (In-House)                   PLAN:          Mixed hyperlipidemia Check labs and predicate any medication changes based on those results.  I did suggest that she think about getting labs prior to her next appointment so we can review while she is here.          Hypertension Continue on her current medication as long as she remains asymptomatic.  Did tell her if she  starts having lightheadedness dizziness etc. let us know we might need to back off on the blood pressure meds          Type 2 diabetes     LABORATORY:  Labs ordered to be performed today include Diabetes Panel 2;CMP, A1C, Lipid, Microalbumin:Creatinine Ratio.            Orders:       [id number]  DIAB - [facility] CMP A1C LIPID AND MICRO ALBUM CR RATIO: [id number]  (Send-Out)            Lumbar radiculopathy Continue to follow with pain management.  Told her she will know if she thinks it is worth going for back surgery or not          Shoulder pain Continue to follow with Dr. Mathis.             Other Orders:       2028F  Foot examination performed (includes examination through visual inspection, sensory exam with monofi  (In-House)           Patient Recommendations:    Dragon transcription disclaimer:        Much of this encounter note is an electronic transcription/translation of spoken language to printed text.  The electronic translation of spoken language may permit erroneous, or at times, nonsensical words or phrases to be inadvertently transcribed.  Although I have reviewed the note for such errors, some may still exist.             CHARGE CAPTURE           **Please note: ICD descriptions below are intended for billing purposes only and may not represent clinical diagnoses**        Primary Diagnosis:         272.2 Mixed hyperlipidemia            E78.2    Mixed hyperlipidemia              Orders:          [id number]   Office/outpatient visit; established patient, level 4  (In-House)           401.1 Hypertension            I10    Essential (primary) hypertension    250.00 Type 2 diabetes            E11.9    Type 2 diabetes mellitus without complications    722.10 Lumbar radiculopathy            M54.17    Radiculopathy, lumbosacral region    719.41 Shoulder pain            M25.511    Pain in right shoulder        Other Orders:           2028F   Foot examination performed (includes examination through visual  inspection, sensory exam with monofi  (In-House)

## 2021-05-18 NOTE — PROGRESS NOTES
Kelsy Rider  1950     Office/Outpatient Visit    Visit Date: Wed, May 12, 2021 10:50 am    Provider: Jonnie Zuñiga MD (Assistant: Sonia Quinones MA)    Location: [facility]        Electronically signed by Jonnie Zuñiga MD on  05/12/2021 12:25:28 PM                             Subjective:        CC: Mrs. Rider is a 71 year old White female.  This is a follow-up visit.  physical exam         HPI:           Mrs. Rider is here for a Medicare wellness visit.  The required HRA questions are integrated within this visit note. Family medical history and individual medical/surgical history were reviewed and updated.  A current height, weight, BMI, blood pressure, and pulse were recorded in the vitals section of the note and have been reviewed. Patient's medications, including supplements, were recorded in the chart and reviewed.  Current providers and suppliers were reviewed and updated.          Self-Assessment of Health: She rates her health as good. She rates her confidence of being able to control/manage most of her health problems as very confident. Her physical/emotional health has limited her social activites not at all.  A review of cognitive impairment was performed, including ability to drive a car, manage finances, and any memory changes, and was found to be negative.  A review of functional ability, including bathing, dressing, walking, and urine/bowel continence as well as level of safety was performed and was found to be negative.  Falls Risk: Has not had any falls or only one fall without injury in the past year.  She denies having trouble hearing the TV/radio when others do not, having to strain to hear or understand conversations and wearing hearing aid(s).  Concerning home safety, she reports that at home she DOES have adequate lighting, a skid resistant shower/tub, grab bars in the bath, handrails on stairs, functioning smoke alarms and absence of throw rugs.       "    Immunization Status: ** Has not received pneumococcal vaccination; Physical Activity: She exercises but less than 20 minutes 3 days per week; Type of diet patient normally eats is described as well-balanced with fruits and vegetables Tobacco: She has never smoked.   Preventative Health updated today       Also here to follow-up on chronic health conditions which include diabetes hypertension and hyperlipidemia.She forgot to bring in her blood sugar log today.Reports BS have been running a little high, but not real bad.  Highest in last few weeks 196 and that was because she ate pretzels.She knows her diet could be better and says she will blame [place].Receives Rapatha injections for her lipids and has worked well.  However in reflection she is wondering if her numbness and burning sensation in her feet began at about the same time that she started on the injections. Told her I am not really familiar with that medication but she should inquire with her cardiologist who prescribes for her.          PHQ-9 Depression Screening: Completed form scanned and in chart; Total Score 1       As noted above the toes/ lower right leg are \"numb and tingling\" and mostly burn especially at night.  Has to take leg out from under the covers.  But the back/hip pain is gone after the back surgery and she is so thankful.      We did review her last bone density which revealed osteoporosis.  She was placed on alendronate and seems to be tolerating well.    ROS:     CONSTITUTIONAL:  Negative for chills, fatigue, fever, and weight change.      EYES:  Negative for blurred vision.      CARDIOVASCULAR:  Negative for chest pain, orthopnea, paroxysmal nocturnal dyspnea and pedal edema.      RESPIRATORY:  Negative for dyspnea.      GASTROINTESTINAL:  Negative for abdominal pain, constipation, diarrhea, nausea and vomiting.      GENITOURINARY:  Negative for dysuria and frequent urination.      NEUROLOGICAL:  Negative for dizziness, headaches, " paresthesias, and weakness.      PSYCHIATRIC:  Negative for anxiety, depression, and sleep disturbances.          Past Medical History / Family History / Social History:         Last Reviewed on 5/12/2021 11:24 AM by Jonnie Zuñiga    Past Medical History:                 PAST MEDICAL HISTORY         Hyperlipidemia     Hypothyroidism         PREVENTIVE HEALTH MAINTENANCE             BONE DENSITY: was last done 11/3/15 with the following abnormality noted-- osteopenia     COLORECTAL CANCER SCREENING: Up to date (colonoscopy q10y; sigmoidoscopy q5y; Cologuard q3y) was last done 2015, Results are in chart     EYE EXAM: Diabetic Eye Exam during this calendar year and results are in chart was last done 02/21/2018     MAMMOGRAM: was last done 11/3/15 with normal results         PAST MEDICAL HISTORY         Positive for    Stress test 10/2019- [facility];         CURRENT MEDICAL PROVIDERS:    Cardiologist: Wagner    Dermatologist: Tacho    General Surgeon: Dr. Calixto    Ophthalmologist: Dr. Aranda    Orthopedist: Dr. Mumtaz Mathis    Pulmonologist: Vaibhav Singleton ([place] pulmon[facility])         Surgical History:         Cholecystectomy    Coronary Artery Stent Placement: 2/13/12;     Tubal Ligation     Breast Bx- Left- Negative; 2015     Epidural Injections;         Family History:     Father: Myocardial Infarction     Mother: Lung Cancer     Brother(s): Type 2 Diabetes     Son(s): Type 1 Diabetes     Daughter(s): Hypothyroidism         Social History:     Occupation: Retired (Prior occupation: [employer])     Marital Status:      Children: 3 children         Tobacco/Alcohol/Supplements:     Last Reviewed on 5/12/2021 10:55 AM by Sonia Quinones    Tobacco: She has never smoked.          Allergies:     Last Reviewed on 5/12/2021 10:54 AM by Sonia Quinones    Plavix:      morphine:          Current Medications:     Last Reviewed on 5/12/2021 10:54 AM by Sonia Quinones    CoQ-10 100 mg oral capsule [once a day ]     "meloxicam 15 mg oral tablet [take 1 tablet (15 mg) by oral route once daily]    Repatha SureClick 140 mg/mL subcutaneous Pen Injector [inject 1 milliliter (140 mg) by subcutaneous route every 2 weeks in the abdomen, thigh, or outer area of upper arm (rotate sites)]    aspirin 81 mg oral tablet, delayed release (enteric coated) [1 tab daily]    levothyroxine 100 mcg oral tablet [TAKE ONE TABLET BY MOUTH DAILY]    Caltrate-600 Plus Vitamin D3 600 mg(1,500mg) -400 unit oral tablet [Take 2 tablet(s) by mouth daily]    Glucophage 850 mg oral tablet [TAKE ONE TABLET BY MOUTH DAILY]    atenoloL-chlorthalidone 50-25 mg oral tablet [TAKE 1/2 TABLET BY MOUTH TWICE A DAY]    Accu-Chek Gricelda Plus test strips  [CHECK BLOOD SUGAR UP TO THREE TIMES A DAY]    lisinopriL 40 mg oral tablet [TAKE ONE TABLET BY MOUTH DAILY]    Lantus Solostar U-100 Insulin 100 unit/mL (3 mL) subcutaneous Insulin Pen [INJECT 70 UNITS UNDER THE SKIN DAILY]    NovoLOG Flexpen U-100 Insulin 100 unit/mL (3 mL) subcutaneous Insulin Pen [take 4U in am, 14 U with noon meal and 6 U with evening along withsliding scale max daily 40 units DX E11.9]    BD Ultra-Fine Ita Pen Needle 32 gauge x 5/32\"  [use as directed with victoza, lantus, & novolog Dx E11.9]    Victoza 2-Osorio 0.6 mg/0.1 mL (18 mg/3 mL) subcutaneous Pen Injector [DIAL AND INJECT UNDER THE SKIN 1.2 MG DAILY]    atorvastatin 20 mg oral tablet [TAKE ONE TABLET BY MOUTH DAILY]    Famotidine 20 mg oral tablet [1 tab daily ]    alendronate 70 mg oral tablet [take 1 po once weekly in the morning, at least 30 min before first food, beverage, or medication of day and do not lie down for 30 min]    Lyrica 50 mg oral capsule [TAKE ONE CAPSULE BY MOUTH THREE TIMES A DAY]        Objective:        Vitals:         Current: 5/12/2021 10:51:37 AM    Ht:  5 ft, 2.25 in;  Wt: 178.8 lbs;  BMI: 32.4T: 97 F (temporal);  BP: 118/70 mm Hg (left arm, sitting);  P: 77 bpm (left arm (BP Cuff), sitting);  sCr: 1.02 mg/dL;  GFR: " 54.07        Exams:     PHYSICAL EXAM:     GENERAL: obese;  well groomed;  no apparent distress;     EYES: nonicteric;     E/N/T: EARS:  normal external auditory canals and tympanic membranes;  grossly normal hearing; OROPHARYNX:  normal mucosa, dentition, gingiva, and posterior pharynx;     NECK: carotid exam is normal with good upstroke and no bruits;     RESPIRATORY: normal appearance and symmetric expansion of chest wall; normal respiratory rate and pattern with no distress; normal breath sounds with no rales, rhonchi, wheezes or rubs;     CARDIOVASCULAR: normal rate; rhythm is regular;  no systolic murmur;     GASTROINTESTINAL: nontender, nondistended; no hepatosplenomegaly or masses; no bruits;     LYMPHATIC: no enlargement of cervical or facial nodes; no supraclavicular nodes;     BREAST/INTEGUMENT: BREASTS: breast exam is normal without masses, skin changes, or nipple discharge;     MUSCULOSKELETAL: no pedal edema;     NEUROLOGIC: no focal lateralizing deficits.  Monofilament test okay bilat feet.;     PSYCHIATRIC:  appropriate affect and demeanor; normal speech pattern; grossly normal memory;     Left foot exam    Protective sensation using Monofilament test: NORMAL sensation. Patient detects .07 grams of force which is considered normal.    Vascular status: normal peripheral vascular exam with palpable dorsal pedal and posterior tibal pulses and brisk digital capillary refill    Skin is intact without sores or ulcers    Right foot exam    Protective sensation using Monofilament test: NORMAL sensation. Patient detects .07 grams of force which is considered normal.    Vascular status: normal peripheral vascular exam with palpable dorsal pedal and posterior tibal pulses and brisk digital capillary refill    Skin is intact without sores or ulcers         Assessment:         Z00.00   Encounter for general adult medical examination without abnormal findings       E11.9   Type 2 diabetes mellitus without  complications       E78.2   Mixed hyperlipidemia       I10   Essential (primary) hypertension       Z13.31   Encounter for screening for depression       E03.9   Hypothyroidism, unspecified       M54.17   Radiculopathy, lumbosacral region       M48.062   Spinal stenosis, lumbar region with neurogenic claudication       Z12.39   Encounter for other screening for malignant neoplasm of breast       M81.0   Age-related osteoporosis without current pathological fracture           ORDERS:         Radiology/Test Orders:       [id number]  Screening digital breast tomosynthesis bi  (Send-Out)              Lab Orders:       [id number]  DIAB1 - [facility] LIPID,CMP, A1C: 60342, 87608, 66565  (Send-Out)            85375  TSH - [facility] TSH  (Send-Out)            03005  VITD - [facility] Vitamin D, 25 Hydroxy  (Send-Out)            82468  BDCB2 - [facility] CBC w/o diff  (Send-Out)              Other Orders:       2028F  Foot examination performed (includes examination through visual inspection, sensory exam with monofilament, and pulse exam - report when any of the three components are completed) (DM)4  (In-House)              Depression screen negative  (In-House)              Subsequent Annual Well Visit Medicare  (x1)                  Plan:         Encounter for general adult medical examination without abnormal findingsWe reviewed the preventive service recommendations and created an individualized handout.        Type 2 diabetes mellitus without complicationsCheck lab and predicate medication changes based on results    LABORATORY:  Labs ordered to be performed today include Diabetes Panel 1; CMP, Lipid, A1C.            Orders:       [id number]  DIAB1 - [facility] LIPID,CMP, A1C: 86750, 72957, 54982  (Send-Out)              Mixed hyperlipidemiaon Rapatha Q 2 wks, suggest she check with cardiology if the leg/foot symptoms might possibly be responsible.        Essential (primary) hypertensionBlood pressure looks okay continue current regimen    LABORATORY:  Labs  ordered to be performed today include CBC W/O DIFF.            Orders:       79871  BDCB2 - [facility] CBC w/o diff  (Send-Out)              Encounter for screening for depression    MIPS PHQ-9 Depression Screening: Completed form scanned and in chart; Total Score 1; Negative Depression Screen           Orders:         Depression screen negative  (In-House)              Hypothyroidism, unspecifiedCheck lab predicate medication change based on results    LABORATORY:  Labs ordered to be performed today include TSH.            Orders:       89405  TSH - [facility] TSH  (Send-Out)              Radiculopathy, lumbosacral regionimproved post surgery        Encounter for other screening for malignant neoplasm of breastnormal breast exam today, will schedule mammo        FOLLOW-UP TESTING #1:    RADIOLOGY:  I have ordered Screening 3D Mammogram Bilateral to be done today.            Orders:       02653  Screening digital breast tomosynthesis bi  (Send-Out)              Age-related osteoporosis without current pathological fracturetolerating aleddronate    LABORATORY:  Labs ordered to be performed today include Vitamin D.            Orders:       03187  VITD - [facility] Vitamin D, 25 Hydroxy  (Send-Out)                  Other Orders      2028F  Foot examination performed (includes examination through visual inspection, sensory exam with monofilament, and pulse exam - report when any of the three components are completed) (DM)4  (In-House)              Other Patient Education Handouts:     Dragon transcription disclaimer:        Much of this encounter note is an electronic transcription/translation of spoken language to printed text.  The electronic translation of spoken language may permit erroneous, or at times, nonsensical words or phrases to be inadvertently transcribed.  Although I have reviewed the note for such errors, some may still exist.        Charge Capture:         Primary Diagnosis:     Z00.00  Encounter for general adult  medical examination without abnormal findings           Orders:      13151  Preventive medicine, established patient, age 65+ years  (In-House)              Subsequent Annual Well Visit Medicare  (x1)          E11.9  Type 2 diabetes mellitus without complications           Orders:      84793-87  Office/outpatient visit; established patient, level 4  (In-House)              E78.2  Mixed hyperlipidemia     I10  Essential (primary) hypertension     Z13.31  Encounter for screening for depression           Orders:        Depression screen negative  (In-House)              E03.9  Hypothyroidism, unspecified     M54.17  Radiculopathy, lumbosacral region     M48.062  Spinal stenosis, lumbar region with neurogenic claudication     Z12.39  Encounter for other screening for malignant neoplasm of breast     M81.0  Age-related osteoporosis without current pathological fracture         Other Orders:       2028F  Foot examination performed (includes examination through visual inspection, sensory exam with monofilament, and pulse exam - report when any of the three components are completed) (DM)4  (In-House)

## 2021-05-18 NOTE — PROGRESS NOTES
Kelsy Rider 1950     Office/Outpatient Visit    Visit Date: Tue, Dec 4, 2018 10:00 am    Provider: Jonnie Zuñiga MD (Assistant: Tesfaye Taylor)    Location: [facility]        Electronically signed by Jonnie Zuñiga MD on  12/09/2018 09:45:16 PM                             SUBJECTIVE:        CC:     Mrs. Rider is a 68 year old White female.  This is a follow-up visit.  has a cough and congestion x1 week;         HPI:         Patient to be evaluated for mixed hyperlipidemia.  Compliance with treatment has been good.  She specifically denies associated symptoms, including muscle pain and weakness.          In regard to the hypertension, she has not kept a blood pressure diary, but states that pressures have been well controlled.  She is tolerating the medication well without side effects.  Compliance with treatment has been good.          Concerning type 2 diabetes, compliance with treatment has been good.  She specifically denies associated symptoms, including nocturia, polydipsia and polyuria.  She reports home blood glucose readings have been excellent, with average fasting glucoses running <120 mg/dL. She checks her glucose 3 to 4 times daily.          URI details; these have been present for the past one to two days.  The symptoms include cough, ear congestion,  watery nasal discharge and clear sputum production.  She denies fever.  She denies exposure to ill contacts.      Will get pain and burning into the right leg.  If out walking has to sit down or the leg might give away. Yesterday had to sit down to cut vegetables to cook.     Also says that she developed cold sore for the first time in many years following her Uri symptoms.     ROS:     CONSTITUTIONAL:  Positive for chills.      EYES:  Negative for blurred vision.      CARDIOVASCULAR:  Negative for chest pain, orthopnea, paroxysmal nocturnal dyspnea and pedal edema.      RESPIRATORY:  Negative for dyspnea.      GASTROINTESTINAL:   Positive for diarrhea.   Negative for abdominal pain, constipation, nausea or vomiting.      GENITOURINARY:  Negative for dysuria and frequent urination.      NEUROLOGICAL:  Negative for dizziness, headaches, paresthesias, and weakness.      PSYCHIATRIC:  Negative for anxiety, depression, and sleep disturbances.          PMH/FMH/SH:     Last Reviewed on 12/04/2018 10:47 AM by Jonnie Zuñiga    Past Medical History:                 PAST MEDICAL HISTORY         Hyperlipidemia     Hypothyroidism         PREVENTIVE HEALTH MAINTENANCE             COLONOSCOPY: Done within the last 10 years was last done 2015     MAMMOGRAM: was last done 11/3/15 with normal results     BONE DENSITY: was last done 11/3/15 with the following abnormality noted-- osteopenia     PNEUMOCOCCAL 23 VACCINE: was last done 8/10/10     Prevnar 13: was last done 11/12/15     INFLUENZA VACCINE: was last done 10/2016     SHINGLES VACCINE: was last done 11/14/13     Tdap VACCINE: was last done 11/14/13         PAST MEDICAL HISTORY             CURRENT MEDICAL PROVIDERS:    Cardiologist: Wagner    Dermatologist: Tacho    General Surgeon: Dr. Calixto    Ophthalmologist: Dr. Aranda    Orthopedist: Dr. Mumtaz Mathis    Pulmonologist: Vaibhav Singleton ([place] [facility])         Surgical History:         Cholecystectomy    Coronary Artery Stent Placement: 2/13/12;     Tubal Ligation      Breast Bx- Left- Negative;     Procedures: colonoscopy 2005 2015     Epidural Injections;         Family History:     Father: Myocardial Infarction     Mother: Lung Cancer     Brother(s): Type 2 Diabetes     Son(s): Type 1 Diabetes     Daughter(s): Hypothyroidism         Social History:     Occupation: Retired (Prior occupation: [employer])     Marital Status:      Children: 3 children         Tobacco/Alcohol/Supplements:     Last Reviewed on 12/04/2018 10:02 AM by Tesfaye Taylor    Tobacco: She has never smoked.              Allergies:     Last Reviewed on 12/04/2018 10:02  AM by Tesfaye Taylor    Plavix:        Current Medications:     Last Reviewed on 12/04/2018 10:03 AM by Tesfaye Taylor    Lantus SoloSTAR 100units/1ml Injection 70 UNITS QDAY     NovoLog FlexPen 100units/1ml Injection take 4U in am, 14 U with noon meal and 6 U with evening along withsliding scale max daily 40 units DX E11.9     Victoza 2-Osorio 18mg/3ml Injection Inject 1.2 mg daily     Atorvastatin Calcium 20mg Tablet Take 1 tablet(s) by mouth daily     Atenolol/Chlorthalidone 50mg/25mg Tablet Take 1/2  tablet by mouth BID     Lisinopril 40mg Tablet Take 1 tablet(s) by mouth daily     Synthroid 0.1mg Tablet Take 1 tablet(s) by mouth daily     Glucophage 850mg Tablet Take 1 tablet(s) by mouth daily     Glucose Reagent Blood Test Strips  Reagent Strips check blood sugar up to TID with accuc chek shannon plus DX E11.9     Aspirin (ASA) 81mg Tablets, Enteric Coated 1 tab daily     Caltrate 600 + D Tablet Take 1 tablet(s) by mouth daily         OBJECTIVE:        Vitals:         Current: 12/4/2018 10:05:30 AM    Ht:  5 ft, 2.25 in;  Wt: 176.4 lbs;  BMI: 32.0    T: 98.3 F (oral);  BP: 119/50 mm Hg (right arm, sitting);  P: 68 bpm (right arm (BP Cuff), sitting);  sCr: 0.93 mg/dL;  GFR: 61.41        Exams:     PHYSICAL EXAM:     GENERAL: well developed, well nourished;  well groomed;  no apparent distress;     EYES: nonicteric;     E/N/T: EARS:  normal external auditory canals and tympanic membranes;  grossly normal hearing; OROPHARYNX:  normal mucosa, dentition, gingiva, and posterior pharynx;  cold sore on right upper lip     NECK:  supple, full ROM; no thyromegaly; no carotid bruits;     RESPIRATORY: normal appearance and symmetric expansion of chest wall; normal respiratory rate and pattern with no distress; normal breath sounds with no rales, rhonchi, wheezes or rubs;     CARDIOVASCULAR: normal rate; rhythm is regular;  no systolic murmur;     LYMPHATIC: no enlargement of cervical or facial nodes; no supraclavicular  nodes;     MUSCULOSKELETAL: no pedal edema;     NEUROLOGIC: no focal lateralizing deficits.;     PSYCHIATRIC:  appropriate affect and demeanor; normal speech pattern; grossly normal memory;         ASSESSMENT           272.2   E78.2  Mixed hyperlipidemia              DDx:     401.1   I10  Hypertension              DDx:     250.00   E11.9  Type 2 diabetes              DDx:     465.8   J06.9  URI              DDx:     722.10   M54.17  Lumbar radiculopathy              DDx:     054.9   B00.89  Cold sore              DDx:         ORDERS:         Meds Prescribed:       Acyclovir 800mg Tablet Take 1 tablet(s) by mouth q8h X 3-4 DAYS; BEGIN AT FIRST SIGN OF COLD SORE  #24 (Twenty Four) tablet(s) Refills: 2         Lab Orders:       [id number]  DIAB[address] [place] [address] LIPID,CMP, A1C: 09996, 81923, 95488  (Send-Out)         APPTO  Appointment need  (In-House)                   PLAN:          Mixed hyperlipidemia cont rx, ck alb          Hypertension cont rx         FOLLOW-UP: Schedule a follow-up visit in 6 months..            Orders:       APPTO  Appointment need  (In-House)            Type 2 diabetes     LABORATORY:  Labs ordered to be performed today include Diabetes Panel 1; CMP, Lipid, A1C.            Orders:       [id number]  DIAB - [facility] LIPID,CMP, A1C: 24932, 03322, 38275  (Send-Out)            URI cont with symptomatic care          Lumbar radiculopathy she will let us know if she wants to pursue MRI and Pain Management          Cold sore           Prescriptions:       Acyclovir 800mg Tablet Take 1 tablet(s) by mouth q8h X 3-4 DAYS; BEGIN AT FIRST SIGN OF COLD SORE  #24 (Twenty Four) tablet(s) Refills: 2             Patient Recommendations:        For  Hypertension:     Schedule a follow-up visit in 6 months.                APPOINTMENT INFORMATION:        Monday Tuesday Wednesday Thursday Friday Saturday Sunday            Time:___________________AM  PM   Date:_____________________             CHARGE CAPTURE           **Please note:  ICD descriptions below are intended for billing purposes only and may not represent clinical diagnoses**        Primary Diagnosis:         272.2 Mixed hyperlipidemia            E78.2    Mixed hyperlipidemia              Orders:          69610   Office/outpatient visit; established patient, level 4  (In-House)           401.1 Hypertension            I10    Essential (primary) hypertension              Orders:          APPTO   Appointment need  (In-House)           250.00 Type 2 diabetes            E11.9    Type 2 diabetes mellitus without complications    465.8 URI            J06.9    Acute upper respiratory infection, unspecified    722.10 Lumbar radiculopathy            M54.17    Radiculopathy, lumbosacral region    054.9 Cold sore            B00.89    Other herpesviral infection        ADDENDUMS:      ____________________________________    Date: 01/22/2019 11:52 AM    Author: Ciarra Azar         Visit Note Faxed to:        User Entered Recipient; Number [phone]

## 2021-05-18 NOTE — PROGRESS NOTES
Kelsy Rider  1950     Office/Outpatient Visit    Visit Date: Mon, Nov 16, 2020 02:57 pm    Provider: Jonnie Zuñiga MD (Assistant: Kathleen Lopez LPN)    Location: [facility]        Electronically signed by Jonnie Zuñiga MD on  11/21/2020 04:12:13 PM                             Subjective:        CC: Gabapentin is now 600mg once a day- started today. Meloxicam is now down to 7.5mg. no longer on PercocetMrs. Tereso is a 70 year old White female.  toes on both feet constantly feel numb/asleep. right foot is worse. also plans to do 6 month diabetic follow up exam today, that was scheduled for next month;         HPI:           Patient presents with type 2 diabetes mellitus without complications.  Compliance with treatment has been good.  She specifically denies associated symptoms, including nocturia, polydipsia and polyuria.  She reports home blood glucose readings have been fairly good, with average fasting glucoses running in the 120-150 mg/dL range. She checks her glucose 1 to 2 times daily.  Her weight is up a couple of pounds and she admits that being confined at home Gives her easy access to the refrigerator          Additionally, she presents with history of mixed hyperlipidemia.  compliance with treatment has been good.  She specifically denies associated symptoms, including muscle pain and weakness.            In regard to the essential (primary) hypertension, compliance with treatment has been good.  She is tolerating the medication well without side effects.  She did not bring her blood pressure diary, but says that pressures have been okay.        She did well with her back surgery. Pain is better. Dr. Nails has said her numbness and discomfort in toes is more likely due to neuropathy.Not having the hot/cold sensations in the past week.  She is weaning down on gabapentin and meloxicam. Says that Kathy Patel the nurse practitioner had suggested consideration of  Jamelad been on Gabapentin 600 mg TID and today is beginning 600 mg QHS .          Hypothyroidism, unspecified details; she denies any related symptoms.  She reports no symptoms suggestive of adverse medication effect.      ROS:     CONSTITUTIONAL:  Negative for chills, fatigue, fever, and weight change.      EYES:  Negative for blurred vision.      CARDIOVASCULAR:  Negative for chest pain, orthopnea, paroxysmal nocturnal dyspnea and pedal edema.      RESPIRATORY:  Negative for dyspnea.      GASTROINTESTINAL:  Negative for abdominal pain, constipation, diarrhea, nausea and vomiting.      GENITOURINARY:  Negative for dysuria and frequent urination.      NEUROLOGICAL:  Negative for dizziness, headaches, paresthesias, and weakness.      PSYCHIATRIC:  Negative for anxiety, depression, and sleep disturbances.          Past Medical History / Family History / Social History:         Last Reviewed on 6/23/2020 08:55 AM by Jonnie Zuñiga    Past Medical History:                 PAST MEDICAL HISTORY         Hyperlipidemia     Hypothyroidism         PREVENTIVE HEALTH MAINTENANCE             BONE DENSITY: was last done 11/3/15 with the following abnormality noted-- osteopenia     COLORECTAL CANCER SCREENING: Up to date (colonoscopy q10y; sigmoidoscopy q5y; Cologuard q3y) was last done 2015, Results are in chart     EYE EXAM: Diabetic Eye Exam during this calendar year and results are in chart was last done 02/21/2018     MAMMOGRAM: was last done 11/3/15 with normal results         PAST MEDICAL HISTORY         Positive for    Stress test 10/2019- [place];         CURRENT MEDICAL PROVIDERS:    Cardiologist: Wagner    Dermatologist: Tacho    General Surgeon: Dr. Calixto    Ophthalmologist: Dr. Aranda    Orthopedist: Dr. Mumtaz Mathis    Pulmonologist: Vaibhav Singleton ([place] pul[facility])         Surgical History:         Cholecystectomy    Coronary Artery Stent Placement: 2/13/12;     Tubal Ligation     Breast Bx- Left-  Negative; 2015     Epidural Injections;         Family History:     Father: Myocardial Infarction     Mother: Lung Cancer     Brother(s): Type 2 Diabetes     Son(s): Type 1 Diabetes     Daughter(s): Hypothyroidism         Social History:     Occupation: Retired (Prior occupation: [employer])     Marital Status:      Children: 3 children         Tobacco/Alcohol/Supplements:     Last Reviewed on 6/23/2020 08:47 AM by Spurling, Sarah C    Tobacco: She has never smoked.          Allergies:     Last Reviewed on 6/23/2020 08:47 AM by Spurling, Sarah C    Plavix:      morphine:          Current Medications:     Last Reviewed on 6/23/2020 08:51 AM by Spurling, Sarah C    gabapentin 300 mg oral capsule [take 1 capsule (300 mg) by oral route 4 times per day]    CoQ-10 100 mg oral capsule [once a day ]    meloxicam 15 mg oral tablet [take 1 tablet (15 mg) by oral route once daily]    Repatha SureClick 140 mg/mL subcutaneous Pen Injector [inject 1 milliliter (140 mg) by subcutaneous route every 2 weeks in the abdomen, thigh, or outer area of upper arm (rotate sites)]    aspirin 81 mg oral tablet, delayed release (enteric coated) [1 tab daily]    levothyroxine 100 mcg oral tablet [TAKE ONE TABLET BY MOUTH DAILY]    Caltrate-600 Plus Vitamin D3 600 mg(1,500mg) -400 unit oral tablet [Take 2 tablet(s) by mouth daily]    atenoloL-chlorthalidone 50-25 mg oral tablet [TAKE 1/2 OF A TABLET BY MOUTH TWO TIMES A DAY]    Glucophage 850 mg oral tablet [TAKE ONE TABLET BY MOUTH DAILY]    Glucose Reagent Blood Test Strips  Reagent Strips [check blood sugar up to TID with accuc chek shannon plus DX E11.9]    lisinopriL 40 mg oral tablet [TAKE ONE TABLET BY MOUTH DAILY]    Lantus Solostar U-100 Insulin 100 unit/mL (3 mL) subcutaneous Insulin Pen [INJECT 70 UNITS SUBCUTANEOUSLY DAILY]    NovoLOG Flexpen U-100 Insulin 100 unit/mL (3 mL) subcutaneous Insulin Pen [take 4U in am, 14 U with noon meal and 6 U with evening along withsliding scale max  "daily 40 units DX E11.9]    BD Ultra-Fine Ita Pen Needle 32 gauge x 5/32\"  [use as directed with victoza, lantus, & novolog Dx E11.9]    Victoza 2-Osorio 0.6 mg/0.1 mL (18 mg/3 mL) subcutaneous Pen Injector [DIAL AND INJECT UNDER THE SKIN 1.2 MG DAILY]    atorvastatin 20 mg oral tablet [TAKE ONE TABLET BY MOUTH DAILY]    Famotidine 20 mg oral tablet [1 tab daily ]    Percocet 5-325 mg oral tablet [take 1 tablet by oral route every 6 hours as needed for pain]    alendronate 70 mg oral tablet [take 1 po once weekly in the morning, at least 30 min before first food, beverage, or medication of day and do not lie down for 30 min]        Objective:        Vitals:         Current: 11/16/2020 3:06:00 PM    Ht:  5 ft, 2.25 in;  Wt: 180.2 lbs;  BMI: 32.7T: 97.3 F (temporal);  BP: 137/87 mm Hg (left arm, sitting);  P: 87 bpm (left arm (BP Cuff), sitting);  sCr: 0.99 mg/dL;  GFR: 56.67        Exams:     PHYSICAL EXAM:     GENERAL: obese;  well groomed;  no apparent distress;     EYES: nonicteric;     E/N/T: EARS:  normal external auditory canals and tympanic membranes;  grossly normal hearing; OROPHARYNX:  normal mucosa, dentition, gingiva, and posterior pharynx;     NECK: carotid exam is normal with good upstroke and no bruits;     RESPIRATORY: normal appearance and symmetric expansion of chest wall; normal respiratory rate and pattern with no distress; normal breath sounds with no rales, rhonchi, wheezes or rubs;     CARDIOVASCULAR: normal rate; rhythm is regular;  no systolic murmur;     LYMPHATIC: no enlargement of cervical or facial nodes; no supraclavicular nodes;     MUSCULOSKELETAL: no pedal edema;     NEUROLOGIC: no focal lateralizing deficits.  Monofilament test okay bilat feet.;     PSYCHIATRIC:  appropriate affect and demeanor; normal speech pattern; grossly normal memory;     Left foot exam    Protective sensation using Monofilament test: NORMAL sensation. Patient detects .07 grams of force which is considered " normal.    Vascular status: normal peripheral vascular exam with palpable dorsal pedal and posterior tibal pulses and brisk digital capillary refill    Skin is intact without sores or ulcers    Right foot exam    Protective sensation using Monofilament test: NORMAL sensation. Patient detects .07 grams of force which is considered normal.    Vascular status: normal peripheral vascular exam with palpable dorsal pedal and posterior tibal pulses and brisk digital capillary refill    Skin is intact without sores or ulcers         Lab/Test Results:         Urine temperature: confirmed (11/16/2020),     All urine drug screen levels confirmed negative: yes (11/16/2020),     Date and time of last pill: gabapentin 11/15/20 6 pm/ans (11/16/2020),     Performed by: tls (11/16/2020),     Collection Time: 1600 (11/16/2020),             Assessment:         E11.9   Type 2 diabetes mellitus without complications       E78.2   Mixed hyperlipidemia       I10   Essential (primary) hypertension       M48.062   Spinal stenosis, lumbar region with neurogenic claudication       R20.2   Paresthesia of skin       E03.9   Hypothyroidism, unspecified       E55.9   Vitamin D deficiency, unspecified       M81.0   Age-related osteoporosis without current pathological fracture       Z79.899   Other long term (current) drug therapy           ORDERS:         Meds Prescribed:       [New Rx] Lyrica 50 mg oral capsule [take 1 capsule (50 mg) by oral route 2  times per day], #60 (sixty) capsules, Refills: 0 (zero)         Lab Orders:       99929  DIAB1 - [facility] LIPID,CMP, A1C: 67494, 36379, 03392  (Send-Out)            23879  TSH - [facility] TSH  (Send-Out)            98682  VITD - [facility] Vitamin D, 25 Hydroxy  (Send-Out)            57691  Drug test prsmv qual dir optical obs per day  (In-House)              Procedures Ordered:       03725  Ankle brachial index  (Send-Out)              Other Orders:       2028F  Foot examination performed (includes examination  through visual inspection, sensory exam with monofilament, and pulse exam - report when any of the three components are completed) (DM)4  (In-House)                      Plan:         Type 2 diabetes mellitus without complicationsCheck lab predicate medication change based on those results    LABORATORY:  Labs ordered to be performed today include Diabetes Panel 1; CMP, Lipid, A1C.            Orders:       33299  DIAB1 - [facility] LIPID,CMP, A1C: 21106, 74839, 34480  (Send-Out)              Mixed hyperlipidemiaCheck lab predicate medication change based on those results        Essential (primary) hypertensionContinue on the current blood pressure medicine        Spinal stenosis, lumbar region with neurogenic claudicationI think a trial of Lyrica for treatment of her paresthesias as possibly originating from neuropathy is reasonable.will have her continue to wean gabapentin 600 mg x 1 wk then 300 mg x 1 wk (or until gone) . Begin Lyrica and we will  titrate up as needed.           Prescriptions:       [New Rx] Lyrica 50 mg oral capsule [take 1 capsule (50 mg) by oral route 2  times per day], #60 (sixty) capsules, Refills: 0 (zero)         Paresthesia of skinAs aboveWe will also check for peripheral vascular disease        FOLLOW-UP TESTING #1:    RADIOLOGY:  I have ordered KIANA testing to be done today.            Orders:       99889  Ankle brachial index  (Send-Out)              Hypothyroidism, unspecified    LABORATORY:  Labs ordered to be performed today include TSH.            Orders:       50777  TSH - [facility] TSH  (Send-Out)              Age-related osteoporosis without current pathological fracture    LABORATORY:  Labs ordered to be performed today include Vitamin D.            Orders:       53382  VITD - [facility] Vitamin D, 25 Hydroxy  (Send-Out)              Other long term (current) drug therapy    Controlled substance documentation: David reviewed; drug screen performed and appropriate; consent is reviewed and signed and  on the chart.  She is aware of risk of addiction on this medication, understands that she will need to follow up for a review every 3 months and her medications will be adjusted or decreased as deemed appropriate at each visit.  No history of drug or alcohol abuse.  No concerns about diversion or abuse. She denies side effects related to the medication.  She is aware that she may be called in for pill counts.  The dosing of this medication will be reviewed on a regular basis and reduced if possible..  Ongoing use of a controlled substance is necessary for this patient to have a normal quality of life     Drug screen Controlled Substance Contract has been ordered           Orders:       75805  Drug test prsmv qual dir optical obs per day  (In-House)                  Other Orders      2028F  Foot examination performed (includes examination through visual inspection, sensory exam with monofilament, and pulse exam - report when any of the three components are completed) (DM)4  (In-House)              Charge Capture:         Primary Diagnosis:     E11.9  Type 2 diabetes mellitus without complications           Orders:      59013  Office/outpatient visit; established patient, level 4  (In-House)              E78.2  Mixed hyperlipidemia     I10  Essential (primary) hypertension     M48.062  Spinal stenosis, lumbar region with neurogenic claudication     R20.2  Paresthesia of skin     E03.9  Hypothyroidism, unspecified     E55.9  Vitamin D deficiency, unspecified     M81.0  Age-related osteoporosis without current pathological fracture     Z79.899  Other long term (current) drug therapy           Orders:      22491  Drug test prsmv qual dir optical obs per day  (In-House)                  Other Orders:       2028F  Foot examination performed (includes examination through visual inspection, sensory exam with monofilament, and pulse exam - report when any of the three components are completed) (DM)4  (In-House)

## 2021-06-04 ENCOUNTER — TRANSCRIBE ORDERS (OUTPATIENT)
Dept: ADMINISTRATIVE | Facility: HOSPITAL | Age: 71
End: 2021-06-04

## 2021-06-04 DIAGNOSIS — Z12.31 OTHER SCREENING MAMMOGRAM: Primary | ICD-10-CM

## 2021-06-14 ENCOUNTER — HOSPITAL ENCOUNTER (OUTPATIENT)
Dept: MAMMOGRAPHY | Facility: HOSPITAL | Age: 71
Discharge: HOME | End: 2021-06-14
Admitting: FAMILY MEDICINE

## 2021-06-14 DIAGNOSIS — Z12.31 OTHER SCREENING MAMMOGRAM: ICD-10-CM

## 2021-06-14 PROCEDURE — 77067 SCR MAMMO BI INCL CAD: CPT

## 2021-06-14 PROCEDURE — 77063 BREAST TOMOSYNTHESIS BI: CPT

## 2021-06-14 RX ORDER — LEVOTHYROXINE SODIUM 0.1 MG/1
TABLET ORAL
Qty: 90 TABLET | Refills: 0 | Status: SHIPPED | OUTPATIENT
Start: 2021-06-14 | End: 2021-09-14

## 2021-06-28 ENCOUNTER — TELEPHONE (OUTPATIENT)
Dept: FAMILY MEDICINE CLINIC | Age: 71
End: 2021-06-28

## 2021-06-28 RX ORDER — ATORVASTATIN CALCIUM 20 MG/1
TABLET, FILM COATED ORAL
Qty: 90 TABLET | Refills: 0 | Status: SHIPPED | OUTPATIENT
Start: 2021-06-28 | End: 2021-09-29

## 2021-06-28 RX ORDER — LISINOPRIL 40 MG/1
TABLET ORAL
Qty: 90 TABLET | Refills: 0 | Status: SHIPPED | OUTPATIENT
Start: 2021-06-28 | End: 2021-09-29

## 2021-06-28 NOTE — TELEPHONE ENCOUNTER
Caller: Kelsy Rider    Relationship to patient: Self    Best call back number: [phone]    Patient is needing: PATIENT CALLED IN AND ASKED TO SPEAK WITH JUAN. WARM TRANSFER UNSUCCESSFUL. PATIENT WOULD LIKE A CALL BACK PLEASE.

## 2021-06-30 DIAGNOSIS — I10 ESSENTIAL HYPERTENSION: Primary | ICD-10-CM

## 2021-06-30 RX ORDER — ATENOLOL AND CHLORTHALIDONE TABLET 50; 25 MG/1; MG/1
TABLET ORAL
Qty: 30 TABLET | Refills: 0 | Status: SHIPPED | OUTPATIENT
Start: 2021-06-30 | End: 2021-08-02

## 2021-07-01 VITALS
TEMPERATURE: 97.9 F | WEIGHT: 179.3 LBS | HEIGHT: 62 IN | DIASTOLIC BLOOD PRESSURE: 64 MMHG | SYSTOLIC BLOOD PRESSURE: 110 MMHG | HEART RATE: 84 BPM | BODY MASS INDEX: 33 KG/M2

## 2021-07-01 VITALS
HEIGHT: 62 IN | DIASTOLIC BLOOD PRESSURE: 61 MMHG | SYSTOLIC BLOOD PRESSURE: 99 MMHG | HEART RATE: 85 BPM | BODY MASS INDEX: 32.2 KG/M2 | TEMPERATURE: 97.7 F | WEIGHT: 175 LBS

## 2021-07-01 VITALS
SYSTOLIC BLOOD PRESSURE: 97 MMHG | HEIGHT: 62 IN | HEART RATE: 76 BPM | WEIGHT: 174 LBS | DIASTOLIC BLOOD PRESSURE: 54 MMHG | TEMPERATURE: 98.5 F | BODY MASS INDEX: 32.02 KG/M2

## 2021-07-01 VITALS
HEIGHT: 62 IN | TEMPERATURE: 98.3 F | DIASTOLIC BLOOD PRESSURE: 50 MMHG | SYSTOLIC BLOOD PRESSURE: 119 MMHG | BODY MASS INDEX: 32.46 KG/M2 | HEART RATE: 68 BPM | WEIGHT: 176.4 LBS

## 2021-07-01 VITALS
WEIGHT: 175 LBS | HEART RATE: 82 BPM | HEIGHT: 62 IN | BODY MASS INDEX: 32.2 KG/M2 | SYSTOLIC BLOOD PRESSURE: 127 MMHG | TEMPERATURE: 98.1 F | DIASTOLIC BLOOD PRESSURE: 55 MMHG

## 2021-07-01 VITALS
WEIGHT: 173.6 LBS | TEMPERATURE: 98.1 F | DIASTOLIC BLOOD PRESSURE: 61 MMHG | SYSTOLIC BLOOD PRESSURE: 123 MMHG | BODY MASS INDEX: 31.94 KG/M2 | HEIGHT: 62 IN | HEART RATE: 70 BPM

## 2021-07-01 VITALS
HEART RATE: 71 BPM | TEMPERATURE: 97.6 F | DIASTOLIC BLOOD PRESSURE: 74 MMHG | WEIGHT: 178.2 LBS | BODY MASS INDEX: 32.79 KG/M2 | SYSTOLIC BLOOD PRESSURE: 159 MMHG | HEIGHT: 62 IN

## 2021-07-02 VITALS
BODY MASS INDEX: 32.87 KG/M2 | HEIGHT: 62 IN | HEART RATE: 81 BPM | SYSTOLIC BLOOD PRESSURE: 130 MMHG | DIASTOLIC BLOOD PRESSURE: 72 MMHG | WEIGHT: 178.6 LBS | TEMPERATURE: 97.8 F

## 2021-07-02 VITALS
WEIGHT: 180.2 LBS | DIASTOLIC BLOOD PRESSURE: 87 MMHG | HEIGHT: 62 IN | BODY MASS INDEX: 33.16 KG/M2 | SYSTOLIC BLOOD PRESSURE: 137 MMHG | TEMPERATURE: 97.3 F | HEART RATE: 87 BPM

## 2021-07-02 VITALS
HEART RATE: 73 BPM | SYSTOLIC BLOOD PRESSURE: 151 MMHG | TEMPERATURE: 98.7 F | BODY MASS INDEX: 32.79 KG/M2 | WEIGHT: 178.2 LBS | HEIGHT: 62 IN | OXYGEN SATURATION: 99 % | DIASTOLIC BLOOD PRESSURE: 71 MMHG

## 2021-07-02 VITALS
HEART RATE: 77 BPM | SYSTOLIC BLOOD PRESSURE: 118 MMHG | TEMPERATURE: 97 F | BODY MASS INDEX: 32.9 KG/M2 | DIASTOLIC BLOOD PRESSURE: 70 MMHG | WEIGHT: 178.8 LBS | HEIGHT: 62 IN

## 2021-07-02 VITALS
BODY MASS INDEX: 32.33 KG/M2 | DIASTOLIC BLOOD PRESSURE: 76 MMHG | HEIGHT: 62 IN | SYSTOLIC BLOOD PRESSURE: 143 MMHG | HEART RATE: 77 BPM

## 2021-07-13 RX ORDER — ALENDRONATE SODIUM 70 MG/1
TABLET ORAL
Qty: 12 TABLET | Refills: 1 | Status: SHIPPED | OUTPATIENT
Start: 2021-07-13 | End: 2022-01-17

## 2021-07-13 RX ORDER — UBIDECARENONE 100 MG
CAPSULE ORAL
COMMUNITY

## 2021-07-13 RX ORDER — PREGABALIN 50 MG/1
1 CAPSULE ORAL 3 TIMES DAILY
COMMUNITY
Start: 2021-06-23 | End: 2021-07-26 | Stop reason: SDUPTHER

## 2021-07-13 RX ORDER — PEN NEEDLE, DIABETIC 32GX 5/32"
5 NEEDLE, DISPOSABLE MISCELLANEOUS DAILY
COMMUNITY
Start: 2021-06-02

## 2021-07-13 RX ORDER — BLOOD SUGAR DIAGNOSTIC
1 STRIP MISCELLANEOUS 3 TIMES DAILY
COMMUNITY
Start: 2021-04-14 | End: 2021-07-27

## 2021-07-13 RX ORDER — FAMOTIDINE 20 MG/1
1 TABLET, FILM COATED ORAL DAILY
COMMUNITY

## 2021-07-13 RX ORDER — EVOLOCUMAB 140 MG/ML
140 INJECTION, SOLUTION SUBCUTANEOUS
COMMUNITY
Start: 2021-06-28

## 2021-07-13 RX ORDER — ALENDRONATE SODIUM 70 MG/1
1 TABLET ORAL WEEKLY
COMMUNITY
Start: 2021-04-14 | End: 2021-07-13 | Stop reason: SDUPTHER

## 2021-07-22 DIAGNOSIS — M48.061 SPINAL STENOSIS OF LUMBAR REGION, UNSPECIFIED WHETHER NEUROGENIC CLAUDICATION PRESENT: Primary | ICD-10-CM

## 2021-07-26 DIAGNOSIS — M48.061 SPINAL STENOSIS OF LUMBAR REGION, UNSPECIFIED WHETHER NEUROGENIC CLAUDICATION PRESENT: Primary | ICD-10-CM

## 2021-07-26 RX ORDER — PREGABALIN 50 MG/1
50 CAPSULE ORAL 3 TIMES DAILY
Qty: 90 CAPSULE | Refills: 1 | Status: SHIPPED | OUTPATIENT
Start: 2021-07-26 | End: 2021-09-10 | Stop reason: ALTCHOICE

## 2021-07-26 RX ORDER — PREGABALIN 50 MG/1
CAPSULE ORAL
Qty: 90 CAPSULE | Refills: 0 | OUTPATIENT
Start: 2021-07-26

## 2021-07-27 RX ORDER — BLOOD SUGAR DIAGNOSTIC
STRIP MISCELLANEOUS
Qty: 100 EACH | Refills: 0 | Status: SHIPPED | OUTPATIENT
Start: 2021-07-27 | End: 2021-11-16 | Stop reason: SDUPTHER

## 2021-07-31 DIAGNOSIS — I10 ESSENTIAL HYPERTENSION: ICD-10-CM

## 2021-08-02 RX ORDER — ATENOLOL AND CHLORTHALIDONE TABLET 50; 25 MG/1; MG/1
TABLET ORAL
Qty: 30 TABLET | Refills: 3 | Status: SHIPPED | OUTPATIENT
Start: 2021-08-02 | End: 2021-12-02

## 2021-08-10 RX ORDER — INSULIN GLARGINE 100 [IU]/ML
INJECTION, SOLUTION SUBCUTANEOUS
Qty: 18 PEN | Refills: 0 | Status: SHIPPED | OUTPATIENT
Start: 2021-08-10 | End: 2021-09-09

## 2021-08-20 DIAGNOSIS — Z79.4 TYPE 2 DIABETES MELLITUS WITHOUT COMPLICATION, WITH LONG-TERM CURRENT USE OF INSULIN (HCC): Primary | ICD-10-CM

## 2021-08-20 DIAGNOSIS — E11.9 TYPE 2 DIABETES MELLITUS WITHOUT COMPLICATION, WITH LONG-TERM CURRENT USE OF INSULIN (HCC): Primary | ICD-10-CM

## 2021-08-20 RX ORDER — LIRAGLUTIDE 6 MG/ML
INJECTION SUBCUTANEOUS
Qty: 6 ML | Refills: 1 | Status: SHIPPED | OUTPATIENT
Start: 2021-08-20 | End: 2021-09-10 | Stop reason: ALTCHOICE

## 2021-09-01 ENCOUNTER — TELEPHONE (OUTPATIENT)
Dept: FAMILY MEDICINE CLINIC | Age: 71
End: 2021-09-01

## 2021-09-01 NOTE — TELEPHONE ENCOUNTER
Caller: Kelsy Rider    Relationship to patient: Self    Best call back number: [phone]     Patient is needing: PT CALLED TO SPEAK TO JUAN, PLEASE ADVISE, THANK YOU.    UNABLE TO WARM TRANSFER

## 2021-09-01 NOTE — TELEPHONE ENCOUNTER
Pt is asking if you will work her in on Friday?  She says she is having right hip pain in the joint and it runs down her leg.

## 2021-09-01 NOTE — TELEPHONE ENCOUNTER
Caller: Kelsy Rider    Relationship: Self    Best call back number: [phone]    What is the best time to reach you: ANYTIME    Who are you requesting to speak with (clinical staff, provider,  specific staff member): JUAN    Do you know the name of the person who called: JUAN    What was the call regarding: RETURNING JUAN'S CALL    Do you require a callback: YES

## 2021-09-07 ENCOUNTER — TELEPHONE (OUTPATIENT)
Dept: FAMILY MEDICINE CLINIC | Age: 71
End: 2021-09-07

## 2021-09-07 NOTE — TELEPHONE ENCOUNTER
Caller: Kelsy Rider    Relationship: Self    Best call back number: [phone]     What is the best time to reach you: ANY    What was the call regarding:PT CALLED AND WANTED JUAN TO CALL HER BACK, PLEASE ADVISE, THANK YOU.    Do you require a callback: YES

## 2021-09-09 RX ORDER — INSULIN GLARGINE 100 [IU]/ML
INJECTION, SOLUTION SUBCUTANEOUS
Qty: 18 PEN | Refills: 1 | Status: SHIPPED | OUTPATIENT
Start: 2021-09-09 | End: 2021-11-01

## 2021-09-10 ENCOUNTER — OFFICE VISIT (OUTPATIENT)
Dept: FAMILY MEDICINE CLINIC | Age: 71
End: 2021-09-10

## 2021-09-10 ENCOUNTER — LAB (OUTPATIENT)
Dept: LAB | Facility: HOSPITAL | Age: 71
End: 2021-09-10

## 2021-09-10 VITALS
SYSTOLIC BLOOD PRESSURE: 129 MMHG | TEMPERATURE: 98.3 F | DIASTOLIC BLOOD PRESSURE: 67 MMHG | HEART RATE: 76 BPM | HEIGHT: 62 IN | WEIGHT: 179.6 LBS | BODY MASS INDEX: 33.05 KG/M2

## 2021-09-10 DIAGNOSIS — E11.22 TYPE 2 DIABETES MELLITUS WITH STAGE 3A CHRONIC KIDNEY DISEASE, WITH LONG-TERM CURRENT USE OF INSULIN (HCC): ICD-10-CM

## 2021-09-10 DIAGNOSIS — N18.31 TYPE 2 DIABETES MELLITUS WITH STAGE 3A CHRONIC KIDNEY DISEASE, WITH LONG-TERM CURRENT USE OF INSULIN (HCC): ICD-10-CM

## 2021-09-10 DIAGNOSIS — Z23 IMMUNIZATION, PNEUMOCOCCUS AND INFLUENZA: ICD-10-CM

## 2021-09-10 DIAGNOSIS — I10 HYPERTENSION, ESSENTIAL: ICD-10-CM

## 2021-09-10 DIAGNOSIS — Z79.899 HIGH RISK MEDICATION USE: ICD-10-CM

## 2021-09-10 DIAGNOSIS — E03.9 ACQUIRED HYPOTHYROIDISM: ICD-10-CM

## 2021-09-10 DIAGNOSIS — Z79.4 TYPE 2 DIABETES MELLITUS WITH STAGE 3A CHRONIC KIDNEY DISEASE, WITH LONG-TERM CURRENT USE OF INSULIN (HCC): ICD-10-CM

## 2021-09-10 DIAGNOSIS — E78.2 MIXED HYPERLIPIDEMIA: ICD-10-CM

## 2021-09-10 DIAGNOSIS — M25.551 RIGHT HIP PAIN: Primary | ICD-10-CM

## 2021-09-10 DIAGNOSIS — M54.17 LUMBOSACRAL RADICULOPATHY: ICD-10-CM

## 2021-09-10 PROBLEM — M81.0 OSTEOPOROSIS, POST-MENOPAUSAL: Status: ACTIVE | Noted: 2021-09-10

## 2021-09-10 PROBLEM — M48.062 NEUROGENIC CLAUDICATION DUE TO LUMBAR SPINAL STENOSIS: Status: ACTIVE | Noted: 2021-09-10

## 2021-09-10 LAB
ALBUMIN SERPL-MCNC: 4.5 G/DL (ref 3.5–5.2)
ALBUMIN/GLOB SERPL: 1.6 G/DL
ALP SERPL-CCNC: 82 U/L (ref 39–117)
ALT SERPL W P-5'-P-CCNC: 19 U/L (ref 1–33)
AMPHET+METHAMPHET UR QL: NEGATIVE
AMPHETAMINES UR QL: NEGATIVE
ANION GAP SERPL CALCULATED.3IONS-SCNC: 8.4 MMOL/L (ref 5–15)
AST SERPL-CCNC: 19 U/L (ref 1–32)
BARBITURATES UR QL SCN: NEGATIVE
BASOPHILS # BLD AUTO: 0.07 10*3/MM3 (ref 0–0.2)
BASOPHILS NFR BLD AUTO: 1.1 % (ref 0–1.5)
BENZODIAZ UR QL SCN: NEGATIVE
BILIRUB SERPL-MCNC: 0.4 MG/DL (ref 0–1.2)
BUN SERPL-MCNC: 24 MG/DL (ref 8–23)
BUN/CREAT SERPL: 25.8 (ref 7–25)
BUPRENORPHINE SERPL-MCNC: NEGATIVE NG/ML
CALCIUM SPEC-SCNC: 9.7 MG/DL (ref 8.6–10.5)
CANNABINOIDS SERPL QL: NEGATIVE
CHLORIDE SERPL-SCNC: 103 MMOL/L (ref 98–107)
CHOLEST SERPL-MCNC: 74 MG/DL (ref 0–200)
CO2 SERPL-SCNC: 29.6 MMOL/L (ref 22–29)
COCAINE UR QL: NEGATIVE
CREAT SERPL-MCNC: 0.93 MG/DL (ref 0.57–1)
DEPRECATED RDW RBC AUTO: 44.8 FL (ref 37–54)
EOSINOPHIL # BLD AUTO: 0.21 10*3/MM3 (ref 0–0.4)
EOSINOPHIL NFR BLD AUTO: 3.2 % (ref 0.3–6.2)
ERYTHROCYTE [DISTWIDTH] IN BLOOD BY AUTOMATED COUNT: 13.4 % (ref 12.3–15.4)
EXPIRATION DATE: NORMAL
GFR SERPL CREATININE-BSD FRML MDRD: 60 ML/MIN/1.73
GLOBULIN UR ELPH-MCNC: 2.8 GM/DL
GLUCOSE SERPL-MCNC: 145 MG/DL (ref 65–99)
HCT VFR BLD AUTO: 41.3 % (ref 34–46.6)
HDLC SERPL-MCNC: 41 MG/DL (ref 40–60)
HGB BLD-MCNC: 13.8 G/DL (ref 12–15.9)
IMM GRANULOCYTES # BLD AUTO: 0.01 10*3/MM3 (ref 0–0.05)
IMM GRANULOCYTES NFR BLD AUTO: 0.2 % (ref 0–0.5)
LDLC SERPL CALC-MCNC: 9 MG/DL (ref 0–100)
LDLC/HDLC SERPL: 0.1 {RATIO}
LYMPHOCYTES # BLD AUTO: 1.59 10*3/MM3 (ref 0.7–3.1)
LYMPHOCYTES NFR BLD AUTO: 24.2 % (ref 19.6–45.3)
Lab: NORMAL
MCH RBC QN AUTO: 30.2 PG (ref 26.6–33)
MCHC RBC AUTO-ENTMCNC: 33.4 G/DL (ref 31.5–35.7)
MCV RBC AUTO: 90.4 FL (ref 79–97)
MDMA UR QL SCN: NEGATIVE
METHADONE UR QL SCN: NEGATIVE
MONOCYTES # BLD AUTO: 0.55 10*3/MM3 (ref 0.1–0.9)
MONOCYTES NFR BLD AUTO: 8.4 % (ref 5–12)
NEUTROPHILS NFR BLD AUTO: 4.15 10*3/MM3 (ref 1.7–7)
NEUTROPHILS NFR BLD AUTO: 62.9 % (ref 42.7–76)
OPIATES UR QL: NEGATIVE
OXYCODONE UR QL SCN: NEGATIVE
PCP UR QL SCN: NEGATIVE
PLATELET # BLD AUTO: 207 10*3/MM3 (ref 140–450)
PMV BLD AUTO: 11.3 FL (ref 6–12)
POTASSIUM SERPL-SCNC: 4.8 MMOL/L (ref 3.5–5.2)
PROT SERPL-MCNC: 7.3 G/DL (ref 6–8.5)
RBC # BLD AUTO: 4.57 10*6/MM3 (ref 3.77–5.28)
SODIUM SERPL-SCNC: 141 MMOL/L (ref 136–145)
TRIGL SERPL-MCNC: 144 MG/DL (ref 0–150)
TSH SERPL DL<=0.05 MIU/L-ACNC: 1.08 UIU/ML (ref 0.27–4.2)
VLDLC SERPL-MCNC: 24 MG/DL (ref 5–40)
WBC # BLD AUTO: 6.58 10*3/MM3 (ref 3.4–10.8)

## 2021-09-10 PROCEDURE — 84443 ASSAY THYROID STIM HORMONE: CPT | Performed by: FAMILY MEDICINE

## 2021-09-10 PROCEDURE — 80061 LIPID PANEL: CPT | Performed by: FAMILY MEDICINE

## 2021-09-10 PROCEDURE — 85025 COMPLETE CBC W/AUTO DIFF WBC: CPT

## 2021-09-10 PROCEDURE — 90732 PPSV23 VACC 2 YRS+ SUBQ/IM: CPT | Performed by: FAMILY MEDICINE

## 2021-09-10 PROCEDURE — 83036 HEMOGLOBIN GLYCOSYLATED A1C: CPT

## 2021-09-10 PROCEDURE — 80053 COMPREHEN METABOLIC PANEL: CPT

## 2021-09-10 PROCEDURE — 80305 DRUG TEST PRSMV DIR OPT OBS: CPT | Performed by: FAMILY MEDICINE

## 2021-09-10 PROCEDURE — 99214 OFFICE O/P EST MOD 30 MIN: CPT | Performed by: FAMILY MEDICINE

## 2021-09-10 PROCEDURE — 82570 ASSAY OF URINE CREATININE: CPT | Performed by: FAMILY MEDICINE

## 2021-09-10 PROCEDURE — 36415 COLL VENOUS BLD VENIPUNCTURE: CPT

## 2021-09-10 PROCEDURE — 82043 UR ALBUMIN QUANTITATIVE: CPT | Performed by: FAMILY MEDICINE

## 2021-09-10 PROCEDURE — 90471 IMMUNIZATION ADMIN: CPT | Performed by: FAMILY MEDICINE

## 2021-09-10 RX ORDER — PREGABALIN 75 MG/1
75 CAPSULE ORAL 3 TIMES DAILY
Qty: 270 CAPSULE | Refills: 0 | Status: SHIPPED | OUTPATIENT
Start: 2021-09-10 | End: 2022-01-17

## 2021-09-10 NOTE — PROGRESS NOTES
"Chief Complaint  Hip Pain (right side, onset august)    Subjective          Kelsy Rider presents to [facility]  History of Present Illness  Kelsy comes in today reporting discomfort in the right hip and leg.  Says that symptoms began when she went on vacation on August 17 slept in a hotel bed with less support than usual thinks that may have been the onset of her discomfort (which of course was her 's fault! LOL).  Pain is mostly right posterior hip/buttock area but also radiates down into the leg sometimes even feels like something full her foot - like a bag of water-   (points along metatarsl head area).  She also reports that in the morning hurts to stretch.  Sometimes has to sit for a while before can get going with her today.  As far as her chronic health conditions she says that blood pressures generally are okay.    Review of Systems     Objective   Vital Signs:   /67   Pulse 76   Temp 98.3 °F (36.8 °C)   Ht 158.1 cm (62.24\")   Wt 81.5 kg (179 lb 9.6 oz)   BMI 32.59 kg/m²     Physical Exam       Result Review :                     Assessment and Plan    Diagnoses and all orders for this visit:    1. Right hip pain (Primary)  Assessment & Plan:  Her history and examination are most consistent with a radicular source of her pain.  Therefore will increase the dose of her Lyrica hopes of improving symptomatology.  Considered giving steroids but due to her diabetes go to try to hold off on that for now.      2. Lumbosacral radiculopathy  Assessment & Plan:  Her symptoms did not improve then can consider referral back to pain management again.    Orders:  -     pregabalin (Lyrica) 75 MG capsule; Take 1 capsule by mouth 3 (Three) Times a Day.  Dispense: 270 capsule; Refill: 0    3. Type 2 diabetes mellitus with stage 3a chronic kidney disease, with long-term current use of insulin (CMS/[facility])  Assessment & Plan:  We will check lab predicate medication change " based on results.  She is interested in increasing her Victoza primarily in hopes that it might also help her control her weight.  She has an appointment scheduled for follow-up in about 2 months.  Go ahead and check labs today while she is here.    Orders:  -     Hemoglobin A1c; Future  -     Microalbumin / Creatinine Urine Ratio - Urine, Clean Catch; Future  -     Liraglutide (VICTOZA) 18 MG/3ML solution pen-injector injection; Inject 1.8 mg under the skin into the appropriate area as directed Daily for 90 days.  Dispense: 9 pen; Refill: 0  -     Microalbumin / Creatinine Urine Ratio - Urine, Clean Catch    4. Mixed hyperlipidemia  Assessment & Plan:  Tolerating her medication.  Will check lab and predicate medication change based on results    Orders:  -     Lipid Panel    5. Hypertension, essential  Assessment & Plan:  Blood pressure looks okay, continue on current regimen.    Orders:  -     Comprehensive Metabolic Panel; Future  -     CBC Auto Differential; Future    6. Acquired hypothyroidism  Assessment & Plan:  Check lab predicate medication change based on results    Orders:  -     TSH    7. High risk medication use  -     pregabalin (Lyrica) 75 MG capsule; Take 1 capsule by mouth 3 (Three) Times a Day.  Dispense: 270 capsule; Refill: 0  -     POC Urine Drug Screen [facility]    8. Immunization, pneumococcus and influenza  -     Pneumococcal Polysaccharide Vaccine 23-Valent Greater Than or Equal To 3yo Subcutaneous / IM      Follow Up   No follow-ups on file.  Patient was given instructions and counseling regarding her condition or for health maintenance advice. Please see specific information pulled into the AVS if appropriate.

## 2021-09-11 LAB
ALBUMIN UR-MCNC: <1.2 MG/DL
CREAT UR-MCNC: 93.9 MG/DL
HBA1C MFR BLD: 6.48 % (ref 4.8–5.6)
MICROALBUMIN/CREAT UR: NORMAL MG/G{CREAT}

## 2021-09-11 NOTE — ASSESSMENT & PLAN NOTE
We will check lab predicate medication change based on results.  She is interested in increasing her Victoza primarily in hopes that it might also help her control her weight.  She has an appointment scheduled for follow-up in about 2 months.  Go ahead and check labs today while she is here.

## 2021-09-11 NOTE — ASSESSMENT & PLAN NOTE
Her history and examination are most consistent with a radicular source of her pain.  Therefore will increase the dose of her Lyrica hopes of improving symptomatology.  Considered giving steroids but due to her diabetes go to try to hold off on that for now.

## 2021-09-14 RX ORDER — LEVOTHYROXINE SODIUM 0.1 MG/1
TABLET ORAL
Qty: 90 TABLET | Refills: 1 | Status: SHIPPED | OUTPATIENT
Start: 2021-09-14 | End: 2022-03-18

## 2021-09-14 RX ORDER — INSULIN ASPART 100 [IU]/ML
INJECTION, SOLUTION INTRAVENOUS; SUBCUTANEOUS
Qty: 22 PEN | Refills: 1 | Status: SHIPPED | OUTPATIENT
Start: 2021-09-14 | End: 2022-01-12

## 2021-09-29 RX ORDER — ATORVASTATIN CALCIUM 20 MG/1
TABLET, FILM COATED ORAL
Qty: 90 TABLET | Refills: 0 | Status: SHIPPED | OUTPATIENT
Start: 2021-09-29 | End: 2021-12-29

## 2021-09-29 RX ORDER — LISINOPRIL 40 MG/1
TABLET ORAL
Qty: 90 TABLET | Refills: 0 | Status: SHIPPED | OUTPATIENT
Start: 2021-09-29 | End: 2021-12-29

## 2021-10-14 ENCOUNTER — CLINICAL SUPPORT (OUTPATIENT)
Dept: FAMILY MEDICINE CLINIC | Age: 71
End: 2021-10-14

## 2021-10-14 DIAGNOSIS — Z23 NEED FOR INFLUENZA VACCINATION: Primary | ICD-10-CM

## 2021-10-14 PROCEDURE — 90471 IMMUNIZATION ADMIN: CPT | Performed by: FAMILY MEDICINE

## 2021-10-14 PROCEDURE — 90662 IIV NO PRSV INCREASED AG IM: CPT | Performed by: FAMILY MEDICINE

## 2021-11-01 RX ORDER — INSULIN GLARGINE 100 [IU]/ML
INJECTION, SOLUTION SUBCUTANEOUS
Qty: 18 ML | Refills: 0 | Status: SHIPPED | OUTPATIENT
Start: 2021-11-01 | End: 2021-12-02

## 2021-11-16 RX ORDER — BLOOD SUGAR DIAGNOSTIC
STRIP MISCELLANEOUS
Qty: 250 EACH | Refills: 0 | Status: SHIPPED | OUTPATIENT
Start: 2021-11-16 | End: 2022-06-27

## 2021-11-30 DIAGNOSIS — I10 ESSENTIAL HYPERTENSION: ICD-10-CM

## 2021-12-01 ENCOUNTER — OFFICE VISIT (OUTPATIENT)
Dept: FAMILY MEDICINE CLINIC | Age: 71
End: 2021-12-01

## 2021-12-01 VITALS
HEART RATE: 82 BPM | WEIGHT: 178.8 LBS | DIASTOLIC BLOOD PRESSURE: 77 MMHG | HEIGHT: 62 IN | BODY MASS INDEX: 32.9 KG/M2 | OXYGEN SATURATION: 95 % | SYSTOLIC BLOOD PRESSURE: 126 MMHG

## 2021-12-01 DIAGNOSIS — E03.9 ACQUIRED HYPOTHYROIDISM: ICD-10-CM

## 2021-12-01 DIAGNOSIS — L72.3 SEBACEOUS CYST: ICD-10-CM

## 2021-12-01 DIAGNOSIS — E66.09 CLASS 1 OBESITY DUE TO EXCESS CALORIES WITH SERIOUS COMORBIDITY AND BODY MASS INDEX (BMI) OF 32.0 TO 32.9 IN ADULT: ICD-10-CM

## 2021-12-01 DIAGNOSIS — N18.31 TYPE 2 DIABETES MELLITUS WITH STAGE 3A CHRONIC KIDNEY DISEASE, WITH LONG-TERM CURRENT USE OF INSULIN (HCC): Primary | ICD-10-CM

## 2021-12-01 DIAGNOSIS — M54.17 LUMBOSACRAL RADICULOPATHY: ICD-10-CM

## 2021-12-01 DIAGNOSIS — I10 HYPERTENSION, ESSENTIAL: ICD-10-CM

## 2021-12-01 DIAGNOSIS — Z79.4 TYPE 2 DIABETES MELLITUS WITH STAGE 3A CHRONIC KIDNEY DISEASE, WITH LONG-TERM CURRENT USE OF INSULIN (HCC): Primary | ICD-10-CM

## 2021-12-01 DIAGNOSIS — E11.22 TYPE 2 DIABETES MELLITUS WITH STAGE 3A CHRONIC KIDNEY DISEASE, WITH LONG-TERM CURRENT USE OF INSULIN (HCC): Primary | ICD-10-CM

## 2021-12-01 DIAGNOSIS — E78.2 MIXED HYPERLIPIDEMIA: ICD-10-CM

## 2021-12-01 PROBLEM — E66.811 CLASS 1 OBESITY DUE TO EXCESS CALORIES WITH SERIOUS COMORBIDITY AND BODY MASS INDEX (BMI) OF 32.0 TO 32.9 IN ADULT: Status: ACTIVE | Noted: 2021-12-01

## 2021-12-01 PROCEDURE — 99214 OFFICE O/P EST MOD 30 MIN: CPT | Performed by: FAMILY MEDICINE

## 2021-12-01 NOTE — PROGRESS NOTES
Chief Complaint  Hyperlipidemia, Hypertension, and Diabetes    Subjective          Kelsy Rider presents to [facility]  History of Present Illness   Here for general follow-up for chronic health conditions.  Tolerating her medications okay.    She forgot her BS log today. Reports that the BS run in 130 or better.    Her feet are better, might be a little numb at times, but certainly lots better than the last time she was here in September..   No back pain since surgery.  Her daughter is told her she does have a spot on her upper back she wants me look at.    Current Outpatient Medications   Medication Sig Dispense Refill   • alendronate (FOSAMAX) 70 MG tablet TAKE 1 TABLET BY MOUTH ONCE WEEKLY ON AN EMPTY STOMACH BEFORE BREAKFAST. REMAIN UPRIGHT FOR 30 MINUTES & TAKE WITH 8 OUNCES OF WATER 12 tablet 1   • aspirin 81 MG chewable tablet Chew 81 mg Daily.     • atenolol-chlorthalidone (TENORETIC) 50-25 MG per tablet TAKE 1/2 TABLET BY MOUTH TWICE A DAY 30 tablet 3   • atorvastatin (LIPITOR) 20 MG tablet TAKE ONE TABLET BY MOUTH DAILY 90 tablet 0   • BD Pen Needle Ita U/F 32G X 4 MM misc Inject 5 each under the skin into the appropriate area as directed Daily.     • Calcium Carbonate-Vitamin D 600-200 MG-UNIT capsule Calcium 600 + D(3) 600 mg calcium- 200 unit oral capsule take 1 capsule by oral route daily   Active     • coenzyme Q10 100 MG capsule Co Q-10 100 mg oral capsule take 1 capsule by oral route daily   Active     • famotidine (PEPCID) 20 MG tablet Take 1 tablet by mouth Daily.     • glucose blood (Accu-Chek Gricelda Plus) test strip Test blood sugar  TID DX E11.9 250 each 0   • Lantus SoloStar 100 UNIT/ML injection pen INJECT 66 UNITS UNDER THE SKIN ONCE DAILY 18 mL 0   • levothyroxine (SYNTHROID, LEVOTHROID) 100 MCG tablet TAKE ONE TABLET BY MOUTH DAILY 90 tablet 1   • Liraglutide (VICTOZA) 18 MG/3ML solution pen-injector injection Inject 1.8 mg under the skin into the  "appropriate area as directed Daily for 90 days. 9 pen 0   • lisinopril (PRINIVIL,ZESTRIL) 40 MG tablet TAKE ONE TABLET BY MOUTH DAILY 90 tablet 0   • metFORMIN (GLUCOPHAGE) 850 MG tablet TAKE ONE TABLET BY MOUTH DAILY 90 tablet 0   • NovoLOG FlexPen 100 UNIT/ML solution pen-injector sc pen INJECT 4 UNITS UNDER THE SKIN IN THE MORNING, INJECT 14 UNITS UNDER THE SKIN WITH NOON MEAL AND INJECT 6 UNITS UNDER THE SKIN WITH EVENING MEAL ALONG WITH SLIDING SCALE **MAX OF 40 UNITS DAILY** 22 pen 1   • pregabalin (Lyrica) 75 MG capsule Take 1 capsule by mouth 3 (Three) Times a Day. 270 capsule 0   • Repatha SureClick solution auto-injector SureClick injection Inject 140 mg under the skin into the appropriate area as directed Every 14 (Fourteen) Days.     • Aspirin Buf,CaCarb-MgCarb-MgO, 81 MG tablet Take 1 tablet by mouth Daily.       No current facility-administered medications for this visit.       Review of Systems   Constitutional: Negative for chills and fever.   Respiratory: Negative for chest tightness and shortness of breath.    Cardiovascular: Negative for chest pain.   Gastrointestinal: Negative for abdominal pain, diarrhea, nausea and vomiting.   Genitourinary: Negative for dysuria and hematuria.   Neurological: Negative for headache and confusion.   Psychiatric/Behavioral: Negative for agitation, hallucinations and suicidal ideas.            Objective   Vital Signs:   /77   Pulse 82   Ht 158.1 cm (62.24\")   Wt 81.1 kg (178 lb 12.8 oz)   SpO2 95%   BMI 32.45 kg/m²     Physical Exam  Constitutional:       Appearance: Normal appearance. She is obese.   Neck:      Vascular: No carotid bruit.   Cardiovascular:      Rate and Rhythm: Normal rate and regular rhythm.      Heart sounds: Normal heart sounds. No murmur heard.      Pulmonary:      Effort: No respiratory distress.      Breath sounds: No wheezing or rales.   Musculoskeletal:      Right lower leg: No edema.      Left lower leg: No edema. "   Lymphadenopathy:      Cervical: No cervical adenopathy.   Skin:     Comments: There is a small sebaceous cyst mid upper back noninflamed   Neurological:      General: No focal deficit present.      Mental Status: She is alert.   Psychiatric:         Attention and Perception: Attention normal.         Mood and Affect: Mood normal.         Speech: Speech normal.            Result Review :   The following data was reviewed by: Jonnie Zuñiga MD on 12/01/2021:    CBC Auto Differential (09/10/2021 11:05)  Comprehensive Metabolic Panel (09/10/2021 11:05)  Hemoglobin A1c (09/10/2021 11:05)  TSH (09/10/2021 11:05)  Lipid Panel (09/10/2021 11:05)  Microalbumin / Creatinine Urine Ratio - Urine, Clean Catch (09/10/2021 10:21)  POC Urine Drug Screen [facility] (09/10/2021 10:18)                Assessment and Plan    Diagnoses and all orders for this visit:    1. Type 2 diabetes mellitus with stage 3a chronic kidney disease, with long-term current use of insulin (HCC) (Primary)  Comments:  Reviewed her last labs from September.  They look really good.  Do not see that we need to repeat labs today.  She has a follow-up appointment already scheduled    2. Mixed hyperlipidemia  Comments:  Continue on her current regimen.  She is taking the Repatha    3. Hypertension, essential  Comments:  Blood pressure looks good.  We will continue current regimen    4. Acquired hypothyroidism  Comments:  Last lab look good continue current regimen    5. Class 1 obesity due to excess calories with serious comorbidity and body mass index (BMI) of 32.0 to 32.9 in adult  Comments:  She did get the diabetes code but admits that she has not ready yet.  She knows that weight loss would be beneficial    6. Lumbosacral radiculopathy  Comments:  Doing well currently    7. Sebaceous cyst  Comments:  Reassured her at this time without inflammation nothing needs to be done but if it does bother her in the future we can certainly I&D        Follow  Up   No follow-ups on file.  Patient was given instructions and counseling regarding her condition or for health maintenance advice. Please see specific information pulled into the AVS if appropriate.

## 2021-12-02 RX ORDER — INSULIN GLARGINE 100 [IU]/ML
INJECTION, SOLUTION SUBCUTANEOUS
Qty: 18 ML | Refills: 0 | Status: SHIPPED | OUTPATIENT
Start: 2021-12-02 | End: 2021-12-27

## 2021-12-02 RX ORDER — ATENOLOL AND CHLORTHALIDONE TABLET 50; 25 MG/1; MG/1
TABLET ORAL
Qty: 30 TABLET | Refills: 1 | Status: SHIPPED | OUTPATIENT
Start: 2021-12-02 | End: 2022-01-31

## 2021-12-06 DIAGNOSIS — Z79.4 TYPE 2 DIABETES MELLITUS WITH STAGE 3A CHRONIC KIDNEY DISEASE, WITH LONG-TERM CURRENT USE OF INSULIN (HCC): ICD-10-CM

## 2021-12-06 DIAGNOSIS — N18.31 TYPE 2 DIABETES MELLITUS WITH STAGE 3A CHRONIC KIDNEY DISEASE, WITH LONG-TERM CURRENT USE OF INSULIN (HCC): ICD-10-CM

## 2021-12-06 DIAGNOSIS — E11.22 TYPE 2 DIABETES MELLITUS WITH STAGE 3A CHRONIC KIDNEY DISEASE, WITH LONG-TERM CURRENT USE OF INSULIN (HCC): ICD-10-CM

## 2021-12-06 RX ORDER — LIRAGLUTIDE 6 MG/ML
INJECTION SUBCUTANEOUS
Qty: 27 PEN | Refills: 0 | Status: SHIPPED | OUTPATIENT
Start: 2021-12-06 | End: 2022-03-04

## 2021-12-27 RX ORDER — INSULIN GLARGINE 100 [IU]/ML
INJECTION, SOLUTION SUBCUTANEOUS
Qty: 18 ML | Refills: 2 | Status: SHIPPED | OUTPATIENT
Start: 2021-12-27 | End: 2022-03-18

## 2021-12-29 DIAGNOSIS — I10 ESSENTIAL HYPERTENSION: ICD-10-CM

## 2021-12-29 DIAGNOSIS — E78.2 MIXED HYPERLIPIDEMIA: Primary | ICD-10-CM

## 2021-12-29 RX ORDER — ATORVASTATIN CALCIUM 20 MG/1
TABLET, FILM COATED ORAL
Qty: 90 TABLET | Refills: 1 | Status: SHIPPED | OUTPATIENT
Start: 2021-12-29 | End: 2022-07-05

## 2021-12-29 RX ORDER — LISINOPRIL 40 MG/1
TABLET ORAL
Qty: 90 TABLET | Refills: 1 | Status: SHIPPED | OUTPATIENT
Start: 2021-12-29 | End: 2022-07-05

## 2022-01-12 RX ORDER — INSULIN ASPART 100 [IU]/ML
INJECTION, SOLUTION INTRAVENOUS; SUBCUTANEOUS
Qty: 21 ML | Refills: 1 | Status: SHIPPED | OUTPATIENT
Start: 2022-01-12 | End: 2022-04-25

## 2022-01-17 DIAGNOSIS — M54.17 LUMBOSACRAL RADICULOPATHY: ICD-10-CM

## 2022-01-17 DIAGNOSIS — Z79.899 HIGH RISK MEDICATION USE: ICD-10-CM

## 2022-01-17 RX ORDER — PREGABALIN 75 MG/1
CAPSULE ORAL
Qty: 270 CAPSULE | Refills: 0 | Status: SHIPPED | OUTPATIENT
Start: 2022-01-17 | End: 2022-04-22

## 2022-01-17 RX ORDER — ALENDRONATE SODIUM 70 MG/1
TABLET ORAL
Qty: 12 TABLET | Refills: 1 | Status: SHIPPED | OUTPATIENT
Start: 2022-01-17 | End: 2022-07-11

## 2022-01-30 DIAGNOSIS — I10 ESSENTIAL HYPERTENSION: ICD-10-CM

## 2022-01-31 RX ORDER — ATENOLOL AND CHLORTHALIDONE TABLET 50; 25 MG/1; MG/1
TABLET ORAL
Qty: 90 TABLET | Refills: 0 | Status: SHIPPED | OUTPATIENT
Start: 2022-01-31 | End: 2022-04-28

## 2022-03-04 DIAGNOSIS — Z79.4 TYPE 2 DIABETES MELLITUS WITH STAGE 3A CHRONIC KIDNEY DISEASE, WITH LONG-TERM CURRENT USE OF INSULIN: ICD-10-CM

## 2022-03-04 DIAGNOSIS — N18.31 TYPE 2 DIABETES MELLITUS WITH STAGE 3A CHRONIC KIDNEY DISEASE, WITH LONG-TERM CURRENT USE OF INSULIN: ICD-10-CM

## 2022-03-04 DIAGNOSIS — E11.22 TYPE 2 DIABETES MELLITUS WITH STAGE 3A CHRONIC KIDNEY DISEASE, WITH LONG-TERM CURRENT USE OF INSULIN: ICD-10-CM

## 2022-03-04 RX ORDER — LIRAGLUTIDE 6 MG/ML
INJECTION SUBCUTANEOUS
Qty: 27 ML | Refills: 0 | Status: SHIPPED | OUTPATIENT
Start: 2022-03-04 | End: 2022-06-02

## 2022-03-15 ENCOUNTER — TELEPHONE (OUTPATIENT)
Dept: FAMILY MEDICINE CLINIC | Age: 72
End: 2022-03-15

## 2022-03-15 NOTE — TELEPHONE ENCOUNTER
Pt states that she is in a lot of pain with her left knee.  Its getting hard to walk.  She says she fell in Jan and isnt sure if that is why it hurts but she doesn't want to see anyone but you.  She is asking if you can work her in sometime soon?

## 2022-03-18 RX ORDER — LEVOTHYROXINE SODIUM 0.1 MG/1
TABLET ORAL
Qty: 90 TABLET | Refills: 1 | Status: SHIPPED | OUTPATIENT
Start: 2022-03-18 | End: 2022-09-16

## 2022-03-18 RX ORDER — INSULIN GLARGINE 100 [IU]/ML
INJECTION, SOLUTION SUBCUTANEOUS
Qty: 18 ML | Refills: 1 | Status: SHIPPED | OUTPATIENT
Start: 2022-03-18 | End: 2022-05-12

## 2022-03-19 NOTE — PROGRESS NOTES
"Chief Complaint  Knee Pain (Fell at end of January, states that she had a bruise from her hip to just past the knee)    Subjective          Kelsy Rider presents to [facility]  Knot w/ blue bruising popped up a few days ago.     Knee Pain   The incident occurred more than 1 week ago. The incident occurred at home. The injury mechanism was a fall. The pain is present in the left knee. The quality of the pain is described as aching. The pain is at a severity of 8/10. The pain is severe. The pain has been fluctuating since onset. Pertinent negatives include no inability to bear weight, loss of motion, loss of sensation, numbness or tingling. Exacerbated by: Sitting too long. She has tried acetaminophen for the symptoms. The treatment provided mild relief.       Objective   Vital Signs:   /47 (BP Location: Right arm, Patient Position: Sitting)   Pulse 59   Temp 98.1 °F (36.7 °C) (Oral)   Ht 157.5 cm (62\")   Wt 83.6 kg (184 lb 3.2 oz)   BMI 33.69 kg/m²     Physical Exam  Constitutional:       General: She is not in acute distress.     Appearance: Normal appearance. She is normal weight.   HENT:      Head: Normocephalic.   Eyes:      Pupils: Pupils are equal, round, and reactive to light.      Visual Fields: Right eye visual fields normal and left eye visual fields normal.   Neck:      Trachea: Trachea normal.   Cardiovascular:      Rate and Rhythm: Normal rate and regular rhythm.      Heart sounds: Normal heart sounds.   Pulmonary:      Effort: Pulmonary effort is normal.      Breath sounds: Normal breath sounds and air entry.   Musculoskeletal:      Right lower leg: No edema.      Left lower leg: No edema.      Comments: Hard knot on medial aspect of left knee with blue discoloration in the middle   Skin:     General: Skin is warm and dry.   Neurological:      Mental Status: She is alert and oriented to person, place, and time.   Psychiatric:         Mood and Affect: Mood " and affect normal.         Behavior: Behavior normal.         Thought Content: Thought content normal.        Result Review :   The following data was reviewed by: FARHAN Morrow on 03/21/2022:   Comprehensive Metabolic Panel (09/10/2021 11:05)               Assessment and Plan    Diagnoses and all orders for this visit:    1. Acute pain of left knee (Primary)  -     D-dimer, Quantitative; Future  -     XR Knee 3 View Left; Future  -     methylPREDNISolone (MEDROL) 4 MG dose pack; Take as directed on package instructions.  Dispense: 21 each; Refill: 0  -     US Venous Doppler Lower Extremity Left (duplex); Future    Of her symptoms only arthritis, such as stiffness, aching, and having difficulty going from a sitting to standing position, but she does have a hard knot that has appeared on the medial aspect of her left knee and is discolored.  Will obtain ultrasound venous Doppler of her left leg.      Follow Up   Return if symptoms worsen or fail to improve.  Patient was given instructions and counseling regarding her condition or for health maintenance advice. Please see specific information pulled into the AVS if appropriate.

## 2022-03-21 ENCOUNTER — LAB (OUTPATIENT)
Dept: LAB | Facility: HOSPITAL | Age: 72
End: 2022-03-21

## 2022-03-21 ENCOUNTER — OFFICE VISIT (OUTPATIENT)
Dept: FAMILY MEDICINE CLINIC | Age: 72
End: 2022-03-21

## 2022-03-21 ENCOUNTER — HOSPITAL ENCOUNTER (OUTPATIENT)
Dept: GENERAL RADIOLOGY | Facility: HOSPITAL | Age: 72
Discharge: HOME | End: 2022-03-21

## 2022-03-21 VITALS
HEART RATE: 59 BPM | WEIGHT: 184.2 LBS | BODY MASS INDEX: 33.9 KG/M2 | HEIGHT: 62 IN | TEMPERATURE: 98.1 F | SYSTOLIC BLOOD PRESSURE: 108 MMHG | DIASTOLIC BLOOD PRESSURE: 47 MMHG

## 2022-03-21 DIAGNOSIS — M25.562 ACUTE PAIN OF LEFT KNEE: ICD-10-CM

## 2022-03-21 DIAGNOSIS — M25.562 ACUTE PAIN OF LEFT KNEE: Primary | ICD-10-CM

## 2022-03-21 LAB — D DIMER PPP FEU-MCNC: 0.42 MG/L (FEU) (ref 0–0.59)

## 2022-03-21 PROCEDURE — 36415 COLL VENOUS BLD VENIPUNCTURE: CPT

## 2022-03-21 PROCEDURE — 85379 FIBRIN DEGRADATION QUANT: CPT

## 2022-03-21 PROCEDURE — 73562 X-RAY EXAM OF KNEE 3: CPT

## 2022-03-21 PROCEDURE — 99213 OFFICE O/P EST LOW 20 MIN: CPT | Performed by: NURSE PRACTITIONER

## 2022-03-21 RX ORDER — METHYLPREDNISOLONE 4 MG/1
TABLET ORAL
Qty: 21 EACH | Refills: 0 | Status: SHIPPED | OUTPATIENT
Start: 2022-03-21 | End: 2022-05-23

## 2022-04-22 DIAGNOSIS — Z79.899 HIGH RISK MEDICATION USE: ICD-10-CM

## 2022-04-22 DIAGNOSIS — M54.17 LUMBOSACRAL RADICULOPATHY: ICD-10-CM

## 2022-04-22 RX ORDER — PREGABALIN 75 MG/1
CAPSULE ORAL
Qty: 270 CAPSULE | Refills: 1 | Status: SHIPPED | OUTPATIENT
Start: 2022-04-22 | End: 2022-11-01

## 2022-04-25 RX ORDER — INSULIN ASPART 100 [IU]/ML
INJECTION, SOLUTION INTRAVENOUS; SUBCUTANEOUS
Qty: 21 ML | Refills: 1 | Status: SHIPPED | OUTPATIENT
Start: 2022-04-25 | End: 2022-08-04

## 2022-04-28 DIAGNOSIS — I10 ESSENTIAL HYPERTENSION: ICD-10-CM

## 2022-04-28 RX ORDER — ATENOLOL AND CHLORTHALIDONE TABLET 50; 25 MG/1; MG/1
TABLET ORAL
Qty: 90 TABLET | Refills: 0 | Status: SHIPPED | OUTPATIENT
Start: 2022-04-28 | End: 2022-07-28

## 2022-05-12 RX ORDER — INSULIN GLARGINE 100 [IU]/ML
INJECTION, SOLUTION SUBCUTANEOUS
Qty: 18 ML | Refills: 1 | Status: SHIPPED | OUTPATIENT
Start: 2022-05-12 | End: 2022-07-07

## 2022-05-23 ENCOUNTER — OFFICE VISIT (OUTPATIENT)
Dept: FAMILY MEDICINE CLINIC | Age: 72
End: 2022-05-23

## 2022-05-23 ENCOUNTER — LAB (OUTPATIENT)
Dept: LAB | Facility: HOSPITAL | Age: 72
End: 2022-05-23

## 2022-05-23 VITALS
OXYGEN SATURATION: 97 % | HEIGHT: 62 IN | WEIGHT: 186 LBS | HEART RATE: 75 BPM | BODY MASS INDEX: 34.23 KG/M2 | DIASTOLIC BLOOD PRESSURE: 63 MMHG | SYSTOLIC BLOOD PRESSURE: 118 MMHG

## 2022-05-23 DIAGNOSIS — M48.062 SPINAL STENOSIS OF LUMBAR REGION WITH NEUROGENIC CLAUDICATION: ICD-10-CM

## 2022-05-23 DIAGNOSIS — E78.2 MIXED HYPERLIPIDEMIA: ICD-10-CM

## 2022-05-23 DIAGNOSIS — Z23 NEED FOR VACCINATION: ICD-10-CM

## 2022-05-23 DIAGNOSIS — E03.9 ACQUIRED HYPOTHYROIDISM: ICD-10-CM

## 2022-05-23 DIAGNOSIS — Z79.4 TYPE 2 DIABETES MELLITUS WITH STAGE 3A CHRONIC KIDNEY DISEASE, WITH LONG-TERM CURRENT USE OF INSULIN: ICD-10-CM

## 2022-05-23 DIAGNOSIS — I10 HYPERTENSION, ESSENTIAL: Primary | ICD-10-CM

## 2022-05-23 DIAGNOSIS — E11.22 TYPE 2 DIABETES MELLITUS WITH STAGE 3A CHRONIC KIDNEY DISEASE, WITH LONG-TERM CURRENT USE OF INSULIN: ICD-10-CM

## 2022-05-23 DIAGNOSIS — Z78.0 POSTMENOPAUSAL STATE: ICD-10-CM

## 2022-05-23 DIAGNOSIS — N18.31 TYPE 2 DIABETES MELLITUS WITH STAGE 3A CHRONIC KIDNEY DISEASE, WITH LONG-TERM CURRENT USE OF INSULIN: ICD-10-CM

## 2022-05-23 DIAGNOSIS — L72.3 SEBACEOUS CYST: ICD-10-CM

## 2022-05-23 DIAGNOSIS — Z72.9 PROBLEM RELATED TO LIFESTYLE: ICD-10-CM

## 2022-05-23 DIAGNOSIS — E66.09 CLASS 1 OBESITY DUE TO EXCESS CALORIES WITH SERIOUS COMORBIDITY AND BODY MASS INDEX (BMI) OF 32.0 TO 32.9 IN ADULT: ICD-10-CM

## 2022-05-23 LAB
ALBUMIN SERPL-MCNC: 4.6 G/DL (ref 3.5–5.2)
ALBUMIN UR-MCNC: <1.2 MG/DL
ALBUMIN/GLOB SERPL: 1.9 G/DL
ALP SERPL-CCNC: 80 U/L (ref 39–117)
ALT SERPL W P-5'-P-CCNC: 23 U/L (ref 1–33)
ANION GAP SERPL CALCULATED.3IONS-SCNC: 10 MMOL/L (ref 5–15)
AST SERPL-CCNC: 27 U/L (ref 1–32)
BASOPHILS # BLD AUTO: 0.06 10*3/MM3 (ref 0–0.2)
BASOPHILS NFR BLD AUTO: 0.9 % (ref 0–1.5)
BILIRUB SERPL-MCNC: 0.4 MG/DL (ref 0–1.2)
BUN SERPL-MCNC: 21 MG/DL (ref 8–23)
BUN/CREAT SERPL: 21.9 (ref 7–25)
CALCIUM SPEC-SCNC: 10.1 MG/DL (ref 8.6–10.5)
CHLORIDE SERPL-SCNC: 102 MMOL/L (ref 98–107)
CHOLEST SERPL-MCNC: 66 MG/DL (ref 0–200)
CO2 SERPL-SCNC: 29 MMOL/L (ref 22–29)
CREAT SERPL-MCNC: 0.96 MG/DL (ref 0.57–1)
CREAT UR-MCNC: 79.9 MG/DL
DEPRECATED RDW RBC AUTO: 48.3 FL (ref 37–54)
EGFRCR SERPLBLD CKD-EPI 2021: 63.4 ML/MIN/1.73
EOSINOPHIL # BLD AUTO: 0.25 10*3/MM3 (ref 0–0.4)
EOSINOPHIL NFR BLD AUTO: 3.6 % (ref 0.3–6.2)
ERYTHROCYTE [DISTWIDTH] IN BLOOD BY AUTOMATED COUNT: 14.2 % (ref 12.3–15.4)
GLOBULIN UR ELPH-MCNC: 2.4 GM/DL
GLUCOSE SERPL-MCNC: 158 MG/DL (ref 65–99)
HBA1C MFR BLD: 7.2 % (ref 4.8–5.6)
HCT VFR BLD AUTO: 40.9 % (ref 34–46.6)
HCV AB SER DONR QL: NORMAL
HDLC SERPL-MCNC: 42 MG/DL (ref 40–60)
HGB BLD-MCNC: 13.3 G/DL (ref 12–15.9)
IMM GRANULOCYTES # BLD AUTO: 0.01 10*3/MM3 (ref 0–0.05)
IMM GRANULOCYTES NFR BLD AUTO: 0.1 % (ref 0–0.5)
LDLC SERPL CALC-MCNC: <5 MG/DL (ref 0–100)
LDLC/HDLC SERPL: 0.03 {RATIO}
LYMPHOCYTES # BLD AUTO: 1.59 10*3/MM3 (ref 0.7–3.1)
LYMPHOCYTES NFR BLD AUTO: 22.7 % (ref 19.6–45.3)
MCH RBC QN AUTO: 29.9 PG (ref 26.6–33)
MCHC RBC AUTO-ENTMCNC: 32.5 G/DL (ref 31.5–35.7)
MCV RBC AUTO: 91.9 FL (ref 79–97)
MICROALBUMIN/CREAT UR: NORMAL MG/G{CREAT}
MONOCYTES # BLD AUTO: 0.6 10*3/MM3 (ref 0.1–0.9)
MONOCYTES NFR BLD AUTO: 8.6 % (ref 5–12)
NEUTROPHILS NFR BLD AUTO: 4.48 10*3/MM3 (ref 1.7–7)
NEUTROPHILS NFR BLD AUTO: 64.1 % (ref 42.7–76)
PLATELET # BLD AUTO: 216 10*3/MM3 (ref 140–450)
PMV BLD AUTO: 11.8 FL (ref 6–12)
POTASSIUM SERPL-SCNC: 4.7 MMOL/L (ref 3.5–5.2)
PROT SERPL-MCNC: 7 G/DL (ref 6–8.5)
RBC # BLD AUTO: 4.45 10*6/MM3 (ref 3.77–5.28)
SODIUM SERPL-SCNC: 141 MMOL/L (ref 136–145)
TRIGL SERPL-MCNC: 113 MG/DL (ref 0–150)
TSH SERPL DL<=0.05 MIU/L-ACNC: 3.12 UIU/ML (ref 0.27–4.2)
VLDLC SERPL-MCNC: NORMAL MG/DL
WBC NRBC COR # BLD: 6.99 10*3/MM3 (ref 3.4–10.8)

## 2022-05-23 PROCEDURE — 91305 COVID-19 (PFIZER) 12+ YRS: CPT | Performed by: FAMILY MEDICINE

## 2022-05-23 PROCEDURE — 82570 ASSAY OF URINE CREATININE: CPT | Performed by: FAMILY MEDICINE

## 2022-05-23 PROCEDURE — 84443 ASSAY THYROID STIM HORMONE: CPT | Performed by: FAMILY MEDICINE

## 2022-05-23 PROCEDURE — 2028F FOOT EXAM PERFORMED: CPT | Performed by: FAMILY MEDICINE

## 2022-05-23 PROCEDURE — 0054A COVID-19 (PFIZER) 12+ YRS: CPT | Performed by: FAMILY MEDICINE

## 2022-05-23 PROCEDURE — 80053 COMPREHEN METABOLIC PANEL: CPT | Performed by: FAMILY MEDICINE

## 2022-05-23 PROCEDURE — 86803 HEPATITIS C AB TEST: CPT | Performed by: FAMILY MEDICINE

## 2022-05-23 PROCEDURE — 82043 UR ALBUMIN QUANTITATIVE: CPT | Performed by: FAMILY MEDICINE

## 2022-05-23 PROCEDURE — 36415 COLL VENOUS BLD VENIPUNCTURE: CPT | Performed by: FAMILY MEDICINE

## 2022-05-23 PROCEDURE — 83036 HEMOGLOBIN GLYCOSYLATED A1C: CPT | Performed by: FAMILY MEDICINE

## 2022-05-23 PROCEDURE — 99214 OFFICE O/P EST MOD 30 MIN: CPT | Performed by: FAMILY MEDICINE

## 2022-05-23 PROCEDURE — 80061 LIPID PANEL: CPT | Performed by: FAMILY MEDICINE

## 2022-05-23 PROCEDURE — 85025 COMPLETE CBC W/AUTO DIFF WBC: CPT | Performed by: FAMILY MEDICINE

## 2022-05-23 NOTE — PROGRESS NOTES
Chief Complaint  Diabetes (6 month ), Hypertension, Hyperlipidemia, Obesity, and Hypothyroidism    Subjective          Kelsy Rider presents to [facility]  History of Present Illness    Here for follow-up on chronic health issues.  Blood pressure looks good today.  She only checks her blood pressure occasionally at home and generally are good.  Her BS however has been running a little on the high side.   Her weight is up 9 lbs over the past two years.  Her back is doing fine after her surgery, but is still on Lyrica for her numbness in feet.   Has hyperlipidemia tolerating her medication okay.  Been a couple years since we have done a bone density study we will get that scheduled for her.  She has decided just yet if she wants to do yearly mammograms or every other year but she did get a notice that she is eligible for repeat.  She has a spot in her mid upper back that her daughter keeps expressing material from that she does look like little white worms we have diagnosed in the past as cyst.  Wants me taking a look at it just to be sure it is okay    Current Outpatient Medications   Medication Sig Dispense Refill   • alendronate (FOSAMAX) 70 MG tablet TAKE 1 TABLET BY MOUTH ONCE WEEKLY ON AN EMPTY STOMACH BEFORE BREAKFAST. REMAIN UPRIGHT FOR 30 MINUTES & TAKE WITH 8 OUNCES OF WATER 12 tablet 1   • aspirin 81 MG chewable tablet Chew 81 mg Daily.     • atenolol-chlorthalidone (TENORETIC) 50-25 MG per tablet TAKE 1/2 TABLET BY MOUTH TWICE A DAY 90 tablet 0   • atorvastatin (LIPITOR) 20 MG tablet TAKE ONE TABLET BY MOUTH DAILY 90 tablet 1   • BD Pen Needle Ita U/F 32G X 4 MM misc Inject 5 each under the skin into the appropriate area as directed Daily.     • Calcium Carbonate-Vitamin D 600-200 MG-UNIT capsule Calcium 600 + D(3) 600 mg calcium- 200 unit oral capsule take 1 capsule by oral route daily   Active     • coenzyme Q10 100 MG capsule Co Q-10 100 mg oral capsule take 1  capsule by oral route daily   Active     • famotidine (PEPCID) 20 MG tablet Take 1 tablet by mouth Daily.     • glucose blood (Accu-Chek Gricelda Plus) test strip Test blood sugar  TID DX E11.9 250 each 0   • Lantus SoloStar 100 UNIT/ML injection pen INJECT 66 UNIT UNDER THE SKIN DAILY 18 mL 1   • levothyroxine (SYNTHROID, LEVOTHROID) 100 MCG tablet TAKE ONE TABLET BY MOUTH DAILY 90 tablet 1   • lisinopril (PRINIVIL,ZESTRIL) 40 MG tablet TAKE ONE TABLET BY MOUTH DAILY 90 tablet 1   • metFORMIN (GLUCOPHAGE) 850 MG tablet TAKE ONE TABLET BY MOUTH DAILY 90 tablet 0   • NovoLOG FlexPen 100 UNIT/ML solution pen-injector sc pen INJECT4 UNITS UNDER THE SKIN EVERY MORNING AND INJECT 14 UNITS UNDER THE SKIN WITH NOON MEAL, AND INJECT 6 UNITS UNDER THE SKIN WITH THE EVENING MEAL ALONG WITH SLIDING SCALE  **MAX OF 40 UNITS PER DAY** 21 mL 1   • pregabalin (LYRICA) 75 MG capsule TAKE ONE CAPSULE BY MOUTH THREE TIMES A  capsule 1   • Repatha SureClick solution auto-injector SureClick injection Inject 140 mg under the skin into the appropriate area as directed Every 14 (Fourteen) Days.     • Victoza 18 MG/3ML solution pen-injector injection DIAL AND INJECT UNDER THE SKIN 1.8 MG DAILY 27 mL 0     No current facility-administered medications for this visit.       Review of Systems   Constitutional: Negative for chills and fever.   Respiratory: Negative for chest tightness and shortness of breath.    Cardiovascular: Negative for chest pain.   Gastrointestinal: Negative for abdominal pain, diarrhea, nausea and vomiting.   Genitourinary: Negative for dysuria and hematuria.   Musculoskeletal:        Did drop a tub of butter on her right pinky toe was black and blue still little bit sore   Neurological: Negative for headache and confusion.   Psychiatric/Behavioral: Negative for agitation, hallucinations and suicidal ideas.            Objective   Vital Signs:   /63 (BP Location: Left arm, Patient Position: Sitting, Cuff Size:  "Adult)   Pulse 75   Ht 157.5 cm (62\")   Wt 84.4 kg (186 lb)   SpO2 97%   BMI 34.02 kg/m²     Physical Exam  Constitutional:       Appearance: Normal appearance.   Neck:      Vascular: No carotid bruit.   Cardiovascular:      Rate and Rhythm: Normal rate and regular rhythm.      Heart sounds: Normal heart sounds. No murmur heard.  Pulmonary:      Effort: No respiratory distress.      Breath sounds: No wheezing or rales.   Musculoskeletal:      Right lower leg: No edema.      Left lower leg: No edema.      Right foot: No deformity.      Left foot: No deformity.   Feet:      Right foot:      Protective Sensation: 5 sites tested. 5 sites sensed.      Skin integrity: No ulcer or callus.      Toenail Condition: Right toenails are normal.      Left foot:      Protective Sensation: 5 sites tested. 5 sites sensed.      Skin integrity: No ulcer or callus.      Toenail Condition: Left toenails are normal.      Comments: Dorsalis pedis posterior tibial pulses are good bilateral and feet are without concerning calluses, ulcerations, or other deformities. Sensation intact to monofilament testing.   She does report tenderness at the right pinky toe there is no discoloration or appreciable swelling  Lymphadenopathy:      Cervical: No cervical adenopathy.   Neurological:      General: No focal deficit present.      Mental Status: She is alert.   Psychiatric:         Attention and Perception: Attention normal.         Mood and Affect: Mood normal.         Speech: Speech normal.            Result Review :                     Assessment and Plan    Diagnoses and all orders for this visit:    1. Hypertension, essential (Primary)  Comments:  Blood pressure looks great continue on current regimen  Orders:  -     CBC Auto Differential  -     Comprehensive Metabolic Panel    2. Mixed hyperlipidemia  Comments:  Continue on current regimen for now.  Check labs predicate medication change based on results  Orders:  -     Lipid Panel    3. " Type 2 diabetes mellitus with stage 3a chronic kidney disease, with long-term current use of insulin ([facility])  Comments:  Her blood sugar log generally looks okay.  We will check labs and predicate medication change based on A1c results  Orders:  -     Microalbumin / Creatinine Urine Ratio - Urine, Clean Catch  -     Hemoglobin A1c    4. Spinal stenosis of lumbar region with neurogenic claudication  Comments:  Doing well after her spine surgery    5. Class 1 obesity due to excess calories with serious comorbidity and body mass index (BMI) of 32.0 to 32.9 in adult  Comments:  Out to her the increase in her weight.  She understands importance of weight loss effect it has on comorbidities    6. Postmenopausal state  Comments:  We will get a DEXA scan  Orders:  -     DEXA Bone Density Axial; Future    7. Problem related to lifestyle  -     Hepatitis C Antibody    8. Need for vaccination  -     COVID-19 Vaccine (Pfizer) Gray Cap    9. Acquired hypothyroidism  Comments:  Check lab predicate medication change based on results  Orders:  -     TSH    10. Sebaceous cyst  Comments:  Reassured her cyst is okay        Follow Up   No follow-ups on file.  Patient was given instructions and counseling regarding her condition or for health maintenance advice. Please see specific information pulled into the AVS if appropriate.

## 2022-05-25 ENCOUNTER — TRANSCRIBE ORDERS (OUTPATIENT)
Dept: ADMINISTRATIVE | Facility: HOSPITAL | Age: 72
End: 2022-05-25

## 2022-05-25 DIAGNOSIS — Z12.31 BREAST CANCER SCREENING BY MAMMOGRAM: Primary | ICD-10-CM

## 2022-06-02 DIAGNOSIS — E11.22 TYPE 2 DIABETES MELLITUS WITH STAGE 3A CHRONIC KIDNEY DISEASE, WITH LONG-TERM CURRENT USE OF INSULIN: ICD-10-CM

## 2022-06-02 DIAGNOSIS — N18.31 TYPE 2 DIABETES MELLITUS WITH STAGE 3A CHRONIC KIDNEY DISEASE, WITH LONG-TERM CURRENT USE OF INSULIN: ICD-10-CM

## 2022-06-02 DIAGNOSIS — Z79.4 TYPE 2 DIABETES MELLITUS WITH STAGE 3A CHRONIC KIDNEY DISEASE, WITH LONG-TERM CURRENT USE OF INSULIN: ICD-10-CM

## 2022-06-02 RX ORDER — LIRAGLUTIDE 6 MG/ML
INJECTION SUBCUTANEOUS
Qty: 27 ML | Refills: 1 | Status: SHIPPED | OUTPATIENT
Start: 2022-06-02

## 2022-06-24 ENCOUNTER — APPOINTMENT (OUTPATIENT)
Dept: BONE DENSITY | Facility: HOSPITAL | Age: 72
End: 2022-06-24

## 2022-06-24 ENCOUNTER — APPOINTMENT (OUTPATIENT)
Dept: MAMMOGRAPHY | Facility: HOSPITAL | Age: 72
End: 2022-06-24

## 2022-06-27 RX ORDER — BLOOD SUGAR DIAGNOSTIC
STRIP MISCELLANEOUS
Qty: 300 EACH | Refills: 1 | Status: SHIPPED | OUTPATIENT
Start: 2022-06-27

## 2022-07-02 DIAGNOSIS — I10 ESSENTIAL HYPERTENSION: ICD-10-CM

## 2022-07-02 DIAGNOSIS — E78.2 MIXED HYPERLIPIDEMIA: ICD-10-CM

## 2022-07-05 RX ORDER — LISINOPRIL 40 MG/1
TABLET ORAL
Qty: 90 TABLET | Refills: 1 | Status: SHIPPED | OUTPATIENT
Start: 2022-07-05 | End: 2023-02-16

## 2022-07-05 RX ORDER — ATORVASTATIN CALCIUM 20 MG/1
TABLET, FILM COATED ORAL
Qty: 90 TABLET | Refills: 1 | Status: SHIPPED | OUTPATIENT
Start: 2022-07-05 | End: 2022-12-30

## 2022-07-07 RX ORDER — INSULIN GLARGINE 100 [IU]/ML
INJECTION, SOLUTION SUBCUTANEOUS
Qty: 18 ML | Refills: 3 | Status: SHIPPED | OUTPATIENT
Start: 2022-07-07 | End: 2022-10-26

## 2022-07-11 RX ORDER — ALENDRONATE SODIUM 70 MG/1
TABLET ORAL
Qty: 12 TABLET | Refills: 0 | Status: SHIPPED | OUTPATIENT
Start: 2022-07-11 | End: 2022-10-24

## 2022-07-14 ENCOUNTER — HOSPITAL ENCOUNTER (OUTPATIENT)
Dept: MAMMOGRAPHY | Facility: HOSPITAL | Age: 72
Discharge: HOME | End: 2022-07-14

## 2022-07-14 ENCOUNTER — HOSPITAL ENCOUNTER (OUTPATIENT)
Dept: BONE DENSITY | Facility: HOSPITAL | Age: 72
Discharge: HOME | End: 2022-07-14

## 2022-07-14 DIAGNOSIS — Z78.0 POSTMENOPAUSAL STATE: ICD-10-CM

## 2022-07-14 DIAGNOSIS — Z12.31 BREAST CANCER SCREENING BY MAMMOGRAM: ICD-10-CM

## 2022-07-14 PROCEDURE — 77063 BREAST TOMOSYNTHESIS BI: CPT

## 2022-07-14 PROCEDURE — 77080 DXA BONE DENSITY AXIAL: CPT

## 2022-07-14 PROCEDURE — 77067 SCR MAMMO BI INCL CAD: CPT

## 2022-07-28 DIAGNOSIS — I10 ESSENTIAL HYPERTENSION: ICD-10-CM

## 2022-07-28 RX ORDER — ATENOLOL AND CHLORTHALIDONE TABLET 50; 25 MG/1; MG/1
TABLET ORAL
Qty: 90 TABLET | Refills: 0 | Status: SHIPPED | OUTPATIENT
Start: 2022-07-28 | End: 2022-10-24

## 2022-08-04 RX ORDER — INSULIN ASPART 100 [IU]/ML
INJECTION, SOLUTION INTRAVENOUS; SUBCUTANEOUS
Qty: 21 ML | Refills: 1 | Status: SHIPPED | OUTPATIENT
Start: 2022-08-04 | End: 2023-02-07

## 2022-09-16 RX ORDER — LEVOTHYROXINE SODIUM 0.1 MG/1
TABLET ORAL
Qty: 90 TABLET | Refills: 1 | Status: SHIPPED | OUTPATIENT
Start: 2022-09-16 | End: 2022-12-07 | Stop reason: SDUPTHER

## 2022-09-26 ENCOUNTER — CLINICAL SUPPORT (OUTPATIENT)
Dept: FAMILY MEDICINE CLINIC | Age: 72
End: 2022-09-26

## 2022-09-26 DIAGNOSIS — Z23 NEED FOR INFLUENZA VACCINATION: Primary | ICD-10-CM

## 2022-09-26 PROCEDURE — 90662 IIV NO PRSV INCREASED AG IM: CPT | Performed by: FAMILY MEDICINE

## 2022-09-26 PROCEDURE — 90471 IMMUNIZATION ADMIN: CPT | Performed by: FAMILY MEDICINE

## 2022-10-24 DIAGNOSIS — I10 ESSENTIAL HYPERTENSION: ICD-10-CM

## 2022-10-24 RX ORDER — ATENOLOL AND CHLORTHALIDONE TABLET 50; 25 MG/1; MG/1
TABLET ORAL
Qty: 90 TABLET | Refills: 0 | Status: SHIPPED | OUTPATIENT
Start: 2022-10-24 | End: 2023-02-16

## 2022-10-24 RX ORDER — ALENDRONATE SODIUM 70 MG/1
TABLET ORAL
Qty: 12 TABLET | Refills: 0 | Status: SHIPPED | OUTPATIENT
Start: 2022-10-24 | End: 2023-01-23

## 2022-10-26 RX ORDER — INSULIN GLARGINE 100 [IU]/ML
INJECTION, SOLUTION SUBCUTANEOUS
Qty: 18 ML | Refills: 3 | Status: SHIPPED | OUTPATIENT
Start: 2022-10-26

## 2022-10-31 DIAGNOSIS — M54.17 LUMBOSACRAL RADICULOPATHY: ICD-10-CM

## 2022-10-31 DIAGNOSIS — Z79.899 HIGH RISK MEDICATION USE: ICD-10-CM

## 2022-11-01 ENCOUNTER — CLINICAL SUPPORT (OUTPATIENT)
Dept: FAMILY MEDICINE CLINIC | Age: 72
End: 2022-11-01

## 2022-11-01 DIAGNOSIS — Z79.899 HIGH RISK MEDICATION USE: Primary | ICD-10-CM

## 2022-11-01 LAB
AMPHET+METHAMPHET UR QL: NEGATIVE
AMPHETAMINES UR QL: NEGATIVE
BARBITURATES UR QL SCN: NEGATIVE
BENZODIAZ UR QL SCN: NEGATIVE
BUPRENORPHINE SERPL-MCNC: NEGATIVE NG/ML
CANNABINOIDS SERPL QL: NEGATIVE
COCAINE UR QL: NEGATIVE
EXPIRATION DATE: NORMAL
Lab: NORMAL
MDMA UR QL SCN: NEGATIVE
METHADONE UR QL SCN: NEGATIVE
OPIATES UR QL: NEGATIVE
OXYCODONE UR QL SCN: NEGATIVE
PCP UR QL SCN: NEGATIVE

## 2022-11-01 PROCEDURE — 80305 DRUG TEST PRSMV DIR OPT OBS: CPT | Performed by: FAMILY MEDICINE

## 2022-11-01 RX ORDER — PREGABALIN 75 MG/1
CAPSULE ORAL
Qty: 270 CAPSULE | Refills: 0 | Status: SHIPPED | OUTPATIENT
Start: 2022-11-01 | End: 2023-02-15

## 2022-12-06 ENCOUNTER — LAB (OUTPATIENT)
Dept: LAB | Facility: HOSPITAL | Age: 72
End: 2022-12-06

## 2022-12-06 ENCOUNTER — OFFICE VISIT (OUTPATIENT)
Dept: FAMILY MEDICINE CLINIC | Age: 72
End: 2022-12-06

## 2022-12-06 VITALS
DIASTOLIC BLOOD PRESSURE: 50 MMHG | HEART RATE: 74 BPM | BODY MASS INDEX: 33.27 KG/M2 | HEIGHT: 62 IN | TEMPERATURE: 98.3 F | SYSTOLIC BLOOD PRESSURE: 101 MMHG | WEIGHT: 180.8 LBS

## 2022-12-06 DIAGNOSIS — E66.09 CLASS 1 OBESITY DUE TO EXCESS CALORIES WITH SERIOUS COMORBIDITY AND BODY MASS INDEX (BMI) OF 33.0 TO 33.9 IN ADULT: ICD-10-CM

## 2022-12-06 DIAGNOSIS — Z79.4 TYPE 2 DIABETES MELLITUS WITH STAGE 3A CHRONIC KIDNEY DISEASE, WITH LONG-TERM CURRENT USE OF INSULIN: ICD-10-CM

## 2022-12-06 DIAGNOSIS — Z00.00 MEDICARE ANNUAL WELLNESS VISIT, SUBSEQUENT: Primary | ICD-10-CM

## 2022-12-06 DIAGNOSIS — E11.22 TYPE 2 DIABETES MELLITUS WITH STAGE 3A CHRONIC KIDNEY DISEASE, WITH LONG-TERM CURRENT USE OF INSULIN: ICD-10-CM

## 2022-12-06 DIAGNOSIS — Z23 NEED FOR VACCINATION: ICD-10-CM

## 2022-12-06 DIAGNOSIS — I10 ESSENTIAL HYPERTENSION: ICD-10-CM

## 2022-12-06 DIAGNOSIS — M48.062 SPINAL STENOSIS OF LUMBAR REGION WITH NEUROGENIC CLAUDICATION: ICD-10-CM

## 2022-12-06 DIAGNOSIS — E03.9 ACQUIRED HYPOTHYROIDISM: ICD-10-CM

## 2022-12-06 DIAGNOSIS — N18.31 TYPE 2 DIABETES MELLITUS WITH STAGE 3A CHRONIC KIDNEY DISEASE, WITH LONG-TERM CURRENT USE OF INSULIN: ICD-10-CM

## 2022-12-06 DIAGNOSIS — E78.2 MIXED HYPERLIPIDEMIA: ICD-10-CM

## 2022-12-06 LAB
ALBUMIN SERPL-MCNC: 4.1 G/DL (ref 3.5–5.2)
ALBUMIN/GLOB SERPL: 1.4 G/DL
ALP SERPL-CCNC: 77 U/L (ref 39–117)
ALT SERPL W P-5'-P-CCNC: 16 U/L (ref 1–33)
ANION GAP SERPL CALCULATED.3IONS-SCNC: 8 MMOL/L (ref 5–15)
AST SERPL-CCNC: 21 U/L (ref 1–32)
BASOPHILS # BLD AUTO: 0.07 10*3/MM3 (ref 0–0.2)
BASOPHILS NFR BLD AUTO: 0.9 % (ref 0–1.5)
BILIRUB SERPL-MCNC: 0.4 MG/DL (ref 0–1.2)
BUN SERPL-MCNC: 19 MG/DL (ref 8–23)
BUN/CREAT SERPL: 19.4 (ref 7–25)
CALCIUM SPEC-SCNC: 10.1 MG/DL (ref 8.6–10.5)
CHLORIDE SERPL-SCNC: 101 MMOL/L (ref 98–107)
CHOLEST SERPL-MCNC: 112 MG/DL (ref 0–200)
CO2 SERPL-SCNC: 32 MMOL/L (ref 22–29)
CREAT SERPL-MCNC: 0.98 MG/DL (ref 0.57–1)
DEPRECATED RDW RBC AUTO: 46.7 FL (ref 37–54)
EGFRCR SERPLBLD CKD-EPI 2021: 61.8 ML/MIN/1.73
EOSINOPHIL # BLD AUTO: 0.21 10*3/MM3 (ref 0–0.4)
EOSINOPHIL NFR BLD AUTO: 2.8 % (ref 0.3–6.2)
ERYTHROCYTE [DISTWIDTH] IN BLOOD BY AUTOMATED COUNT: 13.7 % (ref 12.3–15.4)
GLOBULIN UR ELPH-MCNC: 3 GM/DL
GLUCOSE SERPL-MCNC: 117 MG/DL (ref 65–99)
HBA1C MFR BLD: 6.4 % (ref 4.8–5.6)
HCT VFR BLD AUTO: 41.6 % (ref 34–46.6)
HDLC SERPL-MCNC: 40 MG/DL (ref 40–60)
HGB BLD-MCNC: 13.7 G/DL (ref 12–15.9)
IMM GRANULOCYTES # BLD AUTO: 0.01 10*3/MM3 (ref 0–0.05)
IMM GRANULOCYTES NFR BLD AUTO: 0.1 % (ref 0–0.5)
LDLC SERPL CALC-MCNC: 44 MG/DL (ref 0–100)
LDLC/HDLC SERPL: 0.97 {RATIO}
LYMPHOCYTES # BLD AUTO: 1.76 10*3/MM3 (ref 0.7–3.1)
LYMPHOCYTES NFR BLD AUTO: 23.4 % (ref 19.6–45.3)
MCH RBC QN AUTO: 30.2 PG (ref 26.6–33)
MCHC RBC AUTO-ENTMCNC: 32.9 G/DL (ref 31.5–35.7)
MCV RBC AUTO: 91.6 FL (ref 79–97)
MONOCYTES # BLD AUTO: 0.66 10*3/MM3 (ref 0.1–0.9)
MONOCYTES NFR BLD AUTO: 8.8 % (ref 5–12)
NEUTROPHILS NFR BLD AUTO: 4.8 10*3/MM3 (ref 1.7–7)
NEUTROPHILS NFR BLD AUTO: 64 % (ref 42.7–76)
PLATELET # BLD AUTO: 204 10*3/MM3 (ref 140–450)
PMV BLD AUTO: 11.1 FL (ref 6–12)
POTASSIUM SERPL-SCNC: 4.4 MMOL/L (ref 3.5–5.2)
PROT SERPL-MCNC: 7.1 G/DL (ref 6–8.5)
RBC # BLD AUTO: 4.54 10*6/MM3 (ref 3.77–5.28)
SODIUM SERPL-SCNC: 141 MMOL/L (ref 136–145)
TRIGL SERPL-MCNC: 167 MG/DL (ref 0–150)
TSH SERPL DL<=0.05 MIU/L-ACNC: 6.19 UIU/ML (ref 0.27–4.2)
VLDLC SERPL-MCNC: 28 MG/DL (ref 5–40)
WBC NRBC COR # BLD: 7.51 10*3/MM3 (ref 3.4–10.8)

## 2022-12-06 PROCEDURE — 36415 COLL VENOUS BLD VENIPUNCTURE: CPT | Performed by: FAMILY MEDICINE

## 2022-12-06 PROCEDURE — 83036 HEMOGLOBIN GLYCOSYLATED A1C: CPT | Performed by: FAMILY MEDICINE

## 2022-12-06 PROCEDURE — 80061 LIPID PANEL: CPT | Performed by: FAMILY MEDICINE

## 2022-12-06 PROCEDURE — 99214 OFFICE O/P EST MOD 30 MIN: CPT | Performed by: FAMILY MEDICINE

## 2022-12-06 PROCEDURE — 84443 ASSAY THYROID STIM HORMONE: CPT | Performed by: FAMILY MEDICINE

## 2022-12-06 PROCEDURE — 85025 COMPLETE CBC W/AUTO DIFF WBC: CPT | Performed by: FAMILY MEDICINE

## 2022-12-06 PROCEDURE — 0124A PR ADM SARSCOV2 30MCG/0.3ML BST: CPT | Performed by: FAMILY MEDICINE

## 2022-12-06 PROCEDURE — 1170F FXNL STATUS ASSESSED: CPT | Performed by: FAMILY MEDICINE

## 2022-12-06 PROCEDURE — G0439 PPPS, SUBSEQ VISIT: HCPCS | Performed by: FAMILY MEDICINE

## 2022-12-06 PROCEDURE — 80053 COMPREHEN METABOLIC PANEL: CPT | Performed by: FAMILY MEDICINE

## 2022-12-06 PROCEDURE — 1159F MED LIST DOCD IN RCRD: CPT | Performed by: FAMILY MEDICINE

## 2022-12-06 PROCEDURE — 91312 COVID-19 (PFIZER) BIVALENT BOOSTER 12+YRS: CPT | Performed by: FAMILY MEDICINE

## 2022-12-06 PROCEDURE — 1126F AMNT PAIN NOTED NONE PRSNT: CPT | Performed by: FAMILY MEDICINE

## 2022-12-06 NOTE — ASSESSMENT & PLAN NOTE
Her blood sugar log looks quite good.  We will see what her labs look like today.  Predicate medication change based on result

## 2022-12-06 NOTE — PROGRESS NOTES
The ABCs of the Annual Wellness Visit  Subsequent Medicare Wellness Visit    Subjective    Kelsy Rider is a 72 y.o. female who presents for a Subsequent Medicare Wellness Visit.    The following portions of the patient's history were reviewed and   updated as appropriate: allergies, current medications, past family history, past medical history, past social history, past surgical history and problem list.    Compared to one year ago, the patient feels her physical   health is the same.    Compared to one year ago, the patient feels her mental   health is the same.    Recent Hospitalizations:  She was not admitted to the hospital during the last year.       Current Medical Providers:  Patient Care Team:  Jonnie Zuñiga MD as PCP - General (Family Medicine)  Mumtaz Mathis MD as Consulting Physician (Orthopedic Surgery)  Darin Edward MD as Consulting Physician (Cardiology)    Outpatient Medications Prior to Visit   Medication Sig Dispense Refill   • alendronate (FOSAMAX) 70 MG tablet TAKE 1 TABLET BY MOUTH ONCE WEEKLY ON AN EMPTY STOMACH BEFORE BREAKFAST. REMAIN UPRIGHT FOR 30 MINUTES AND TAKE WITH 8 OUNCES OF WATER 12 tablet 0   • aspirin 81 MG chewable tablet Chew 81 mg Daily.     • atenolol-chlorthalidone (TENORETIC) 50-25 MG per tablet TAKE 1/2 TABLET BY MOUTH TWICE A DAY 90 tablet 0   • atorvastatin (LIPITOR) 20 MG tablet TAKE ONE TABLET BY MOUTH DAILY 90 tablet 1   • BD Pen Needle Ita U/F 32G X 4 MM misc Inject 5 each under the skin into the appropriate area as directed Daily.     • Calcium Carbonate-Vitamin D 600-200 MG-UNIT capsule Calcium 600 + D(3) 600 mg calcium- 200 unit oral capsule take 1 capsule by oral route daily   Active     • coenzyme Q10 100 MG capsule Co Q-10 100 mg oral capsule take 1 capsule by oral route daily   Active     • famotidine (PEPCID) 20 MG tablet Take 1 tablet by mouth Daily.     • glucose blood (Accu-Chek Gricelda Plus) test strip USE TO TEST BLOOD SUGAR THREE TIMES A   each 1   • Lantus SoloStar 100 UNIT/ML injection pen INJECT 66 UNITS UNDER THE SKIN DAILY 18 mL 3   • lisinopril (PRINIVIL,ZESTRIL) 40 MG tablet TAKE ONE TABLET BY MOUTH DAILY 90 tablet 1   • NovoLOG FlexPen 100 UNIT/ML solution pen-injector sc pen INJECT 4 UNITS UNDER THE SKIN EVERY MORNING AND INJECT 14 UNITS UNDER THE SKIN WITH NOON MEAL, AND INJECT 6 UNITS UNDER THE SKIN WITH EVENING MEAL ALONG WITH SLIDING SCALE   **MAX OF 40 UNITS PER DAY** 21 mL 1   • pregabalin (LYRICA) 75 MG capsule TAKE ONE CAPSULE BY MOUTH THREE TIMES A  capsule 0   • Victoza 18 MG/3ML solution pen-injector injection DIAL AND INJECT UNDER THE SKIN 1.8 MG DAILY 27 mL 1   • levothyroxine (SYNTHROID, LEVOTHROID) 100 MCG tablet TAKE ONE TABLET BY MOUTH DAILY 90 tablet 1   • metFORMIN (GLUCOPHAGE) 850 MG tablet TAKE ONE TABLET BY MOUTH DAILY 90 tablet 0   • Repatha SureClick solution auto-injector SureClick injection Inject 140 mg under the skin into the appropriate area as directed Every 14 (Fourteen) Days.       No facility-administered medications prior to visit.       No opioid medication identified on active medication list. I have reviewed chart for other potential  high risk medication/s and harmful drug interactions in the elderly.          Aspirin is on active medication list. Aspirin use is indicated based on review of current medical condition/s. Pros and cons of this therapy have been discussed today. Benefits of this medication outweigh potential harm.  Patient has been encouraged to continue taking this medication.  .      Patient Active Problem List   Diagnosis   • Other chronic pain   • Spinal stenosis of lumbar region   • DDD (degenerative disc disease), lumbar   • Osteoporosis, post-menopausal   • Neurogenic claudication due to lumbar spinal stenosis   • Lumbosacral radiculopathy   • Acquired hypothyroidism   • Hypertension, essential   • Mixed hyperlipidemia   • Type 2 diabetes mellitus with stage 3a chronic  "kidney disease, with long-term current use of insulin (HCC)   • Right hip pain   • High risk medication use   • Immunization, pneumococcus and influenza   • Class 1 obesity due to excess calories with serious comorbidity and body mass index (BMI) of 33.0 to 33.9 in adult   • Sebaceous cyst   • Medicare annual wellness visit, subsequent     Advance Care Planning  Advance Directive is not on file.  ACP discussion was held with the patient during this visit. Patient does not have an advance directive, information provided.     Objective    Vitals:    12/06/22 0909   BP: 101/50   BP Location: Left arm   Patient Position: Sitting   Pulse: 74   Temp: 98.3 °F (36.8 °C)   TempSrc: Oral   Weight: 82 kg (180 lb 12.8 oz)   Height: 157.5 cm (62\")   PainSc: 0-No pain     Estimated body mass index is 33.07 kg/m² as calculated from the following:    Height as of this encounter: 157.5 cm (62\").    Weight as of this encounter: 82 kg (180 lb 12.8 oz).    BMI is >= 30 and <35. (Class 1 Obesity). The following options were offered after discussion;: exercise counseling/recommendations and nutrition counseling/recommendations      Does the patient have evidence of cognitive impairment? No    Lab Results   Component Value Date    TRIG 167 (H) 12/06/2022    HDL 40 12/06/2022    LDL 44 12/06/2022    VLDL 28 12/06/2022    HGBA1C 6.40 (H) 12/06/2022        HEALTH RISK ASSESSMENT    Smoking Status:  Social History     Tobacco Use   Smoking Status Never   Smokeless Tobacco Never     Alcohol Consumption:  Social History     Substance and Sexual Activity   Alcohol Use No     Fall Risk Screen:    STEADI Fall Risk Assessment was completed, and patient is at LOW risk for falls.Assessment completed on:12/6/2022    Depression Screening:  PHQ-2/PHQ-9 Depression Screening 12/6/2022   Retired PHQ-9 Total Score -   Retired Total Score -   Little Interest or Pleasure in Doing Things 0-->not at all   Feeling Down, Depressed or Hopeless 0-->not at all "   PHQ-9: Brief Depression Severity Measure Score 0       Health Habits and Functional and Cognitive Screening:  Functional & Cognitive Status 12/6/2022   Do you have difficulty preparing food and eating? No   Do you have difficulty bathing yourself, getting dressed or grooming yourself? No   Do you have difficulty using the toilet? No   Do you have difficulty moving around from place to place? No   Do you have trouble with steps or getting out of a bed or a chair? No   Current Diet Well Balanced Diet   Dental Exam Up to date   Eye Exam Up to date   Exercise (times per week) 0 times per week   Current Exercises Include No Regular Exercise;Walking;House Cleaning   Do you need help using the phone?  No   Are you deaf or do you have serious difficulty hearing?  No   Do you need help with transportation? No   Do you need help shopping? No   Do you need help preparing meals?  No   Do you need help with housework?  No   Do you need help with laundry? No   Do you need help taking your medications? No   Do you need help managing money? No   Do you ever drive or ride in a car without wearing a seat belt? No   Have you felt unusual stress, anger or loneliness in the last month? No   Who do you live with? Spouse   If you need help, do you have trouble finding someone available to you? No   Do you have difficulty concentrating, remembering or making decisions? No       Age-appropriate Screening Schedule:  Refer to the list below for future screening recommendations based on patient's age, sex and/or medical conditions. Orders for these recommended tests are listed in the plan section. The patient has been provided with a written plan.    Health Maintenance   Topic Date Due   • ZOSTER VACCINE (2 of 3) 01/09/2014   • DIABETIC FOOT EXAM  Never done   • DIABETIC EYE EXAM  03/04/2023   • URINE MICROALBUMIN  05/23/2023   • HEMOGLOBIN A1C  06/06/2023   • LIPID PANEL  12/06/2023   • MAMMOGRAM  07/14/2024   • DXA SCAN  07/14/2024   •  TDAP/TD VACCINES (3 - Td or Tdap) 01/04/2032   • INFLUENZA VACCINE  Completed                CMS Preventative Services Quick Reference  Risk Factors Identified During Encounter  Inactivity/Sedentary  Obesity/Overweight   The above risks/problems have been discussed with the patient.  Pertinent information has been shared with the patient in the After Visit Summary.  An After Visit Summary and PPPS were made available to the patient.    Follow Up:   Next Medicare Wellness visit to be scheduled in 1 year.       Additional E&M Note during same encounter follows:  Patient has multiple medical problems which are significant and separately identifiable that require additional work above and beyond the Medicare Wellness Visit.      Chief Complaint  Medicare Wellness-subsequent, Hypertension, Diabetes, Hyperlipidemia (Pt not currently using repatha due to cost), Hypothyroidism, Spinal stenosis of lumbar region with neurogenic claudicatio, and Postmenopausal state    Subjective        HPI  Kelsy Rider is also being seen today for follow-up on chronic health issues including diabetes, hypertension, hyperlipidemia, hypothyroidism, and obesity.    She reports that she not had any problems with back pain or leg pain since her surgery however her toes and feet continue to have some neuropathy type symptoms.  She describes a sensation of coldness or heat.  She is wondering if the pregabalin is actually providing any benefit.  We talked about the possibility of trying to wean down on the dosage see what her symptoms do.    She brings with her a list of blood sugars that look quite good for the most part.  Occasionally has some readings in the 200 range but she says she can usually account for why that occurs.    Blood pressure looks great today.  She is tolerating her medication without problems of orthostasis.    Has hyperlipidemia and is now taking Repatha as prescribed by Dr. Edward.  She says that her new insurance might be  "able to get medication for free if it is administered at the doctor's office.  I told her if she gives us a little more information we be glad to work with her.  She is taking co-Q10 at nighttime that seems to have helped with her leg cramps.         Objective   Vital Signs:  /50 (BP Location: Left arm, Patient Position: Sitting)   Pulse 74   Temp 98.3 °F (36.8 °C) (Oral)   Ht 157.5 cm (62\")   Wt 82 kg (180 lb 12.8 oz)   BMI 33.07 kg/m²     Physical Exam  Vitals and nursing note reviewed.   Constitutional:       General: She is not in acute distress.     Appearance: Normal appearance. She is obese.   HENT:      Right Ear: Tympanic membrane and ear canal normal.      Left Ear: Tympanic membrane and ear canal normal.      Mouth/Throat:      Mouth: Mucous membranes are moist.      Pharynx: Oropharynx is clear. No oropharyngeal exudate.   Eyes:      General: No scleral icterus.     Conjunctiva/sclera: Conjunctivae normal.   Neck:      Vascular: No carotid bruit.   Cardiovascular:      Rate and Rhythm: Normal rate and regular rhythm.      Heart sounds: No murmur heard.    No friction rub. No gallop.   Pulmonary:      Effort: No respiratory distress.      Breath sounds: No wheezing or rales.   Abdominal:      General: Bowel sounds are normal.      Palpations: Abdomen is soft. There is no mass.      Tenderness: There is no abdominal tenderness. There is no guarding or rebound.   Musculoskeletal:         General: No swelling.      Right lower leg: No edema.      Left lower leg: No edema.   Lymphadenopathy:      Cervical: No cervical adenopathy.   Skin:     Coloration: Skin is not jaundiced.      Findings: No lesion.   Neurological:      General: No focal deficit present.      Mental Status: She is alert and oriented to person, place, and time.   Psychiatric:         Mood and Affect: Mood normal.         Behavior: Behavior normal.         Thought Content: Thought content normal.         Judgment: Judgment normal. "          The following data was reviewed by: Jonnie Zuñiga MD on 12/06/2022:               Assessment and Plan   Diagnoses and all orders for this visit:    1. Medicare annual wellness visit, subsequent (Primary)  Assessment & Plan:  We reviewed the preventive service recommendations and created an individualized handout      2. Type 2 diabetes mellitus with stage 3a chronic kidney disease, with long-term current use of insulin (HCC)  Assessment & Plan:  Her blood sugar log looks quite good.  We will see what her labs look like today.  Predicate medication change based on result    Orders:  -     Hemoglobin A1c  -     metFORMIN (GLUCOPHAGE) 850 MG tablet; Take 1 tablet by mouth Daily.  Dispense: 90 tablet; Refill: 1    3. Mixed hyperlipidemia  Assessment & Plan:  We will see what her labs look like today.  She is on Repatha expect good results    Orders:  -     Lipid Panel  -     Comprehensive Metabolic Panel    4. Acquired hypothyroidism  -     TSH    5. Class 1 obesity due to excess calories with serious comorbidity and body mass index (BMI) of 33.0 to 33.9 in adult  Assessment & Plan:  She has had 6 pound weight loss since last visit.  Commended her for the success.  Encouraged her to continue the efforts she has further to go.      6. Essential hypertension  Comments:  Blood pressure looks excellent continue on current regimen  Orders:  -     CBC Auto Differential    7. Spinal stenosis of lumbar region with neurogenic claudication  Assessment & Plan:  Doing really well postsurgical.      8. Need for vaccination  -     COVID-19 Bivalent Booster (Pfizer) 12+yrs           Follow Up   No follow-ups on file.  Patient was given instructions and counseling regarding her condition or for health maintenance advice. Please see specific information pulled into the AVS if appropriate.

## 2022-12-06 NOTE — ASSESSMENT & PLAN NOTE
She has had 6 pound weight loss since last visit.  Commended her for the success.  Encouraged her to continue the efforts she has further to go.

## 2022-12-07 RX ORDER — LEVOTHYROXINE SODIUM 0.12 MG/1
125 TABLET ORAL DAILY
Qty: 90 TABLET | Refills: 1 | Status: SHIPPED | OUTPATIENT
Start: 2022-12-07

## 2022-12-30 DIAGNOSIS — E78.2 MIXED HYPERLIPIDEMIA: ICD-10-CM

## 2022-12-30 RX ORDER — ATORVASTATIN CALCIUM 20 MG/1
TABLET, FILM COATED ORAL
Qty: 90 TABLET | Refills: 1 | Status: SHIPPED | OUTPATIENT
Start: 2022-12-30

## 2023-01-19 ENCOUNTER — OFFICE VISIT (OUTPATIENT)
Dept: FAMILY MEDICINE CLINIC | Age: 73
End: 2023-01-19
Payer: MEDICARE

## 2023-01-19 VITALS
TEMPERATURE: 97.8 F | SYSTOLIC BLOOD PRESSURE: 129 MMHG | HEART RATE: 61 BPM | OXYGEN SATURATION: 96 % | WEIGHT: 176.13 LBS | HEIGHT: 62 IN | BODY MASS INDEX: 32.41 KG/M2 | DIASTOLIC BLOOD PRESSURE: 48 MMHG

## 2023-01-19 DIAGNOSIS — U07.1 COVID-19 VIRUS INFECTION: Primary | ICD-10-CM

## 2023-01-19 DIAGNOSIS — R09.81 NASAL CONGESTION: ICD-10-CM

## 2023-01-19 DIAGNOSIS — N18.31 TYPE 2 DIABETES MELLITUS WITH STAGE 3A CHRONIC KIDNEY DISEASE, WITH LONG-TERM CURRENT USE OF INSULIN: ICD-10-CM

## 2023-01-19 DIAGNOSIS — Z79.4 TYPE 2 DIABETES MELLITUS WITH STAGE 3A CHRONIC KIDNEY DISEASE, WITH LONG-TERM CURRENT USE OF INSULIN: ICD-10-CM

## 2023-01-19 DIAGNOSIS — R05.9 COUGH, UNSPECIFIED TYPE: ICD-10-CM

## 2023-01-19 DIAGNOSIS — I10 HYPERTENSION, ESSENTIAL: ICD-10-CM

## 2023-01-19 DIAGNOSIS — E11.22 TYPE 2 DIABETES MELLITUS WITH STAGE 3A CHRONIC KIDNEY DISEASE, WITH LONG-TERM CURRENT USE OF INSULIN: ICD-10-CM

## 2023-01-19 LAB
EXPIRATION DATE: ABNORMAL
FLUAV AG UPPER RESP QL IA.RAPID: NOT DETECTED
FLUBV AG UPPER RESP QL IA.RAPID: NOT DETECTED
INTERNAL CONTROL: ABNORMAL
Lab: ABNORMAL
SARS-COV-2 AG UPPER RESP QL IA.RAPID: DETECTED

## 2023-01-19 PROCEDURE — 99214 OFFICE O/P EST MOD 30 MIN: CPT | Performed by: FAMILY MEDICINE

## 2023-01-19 PROCEDURE — 87428 SARSCOV & INF VIR A&B AG IA: CPT | Performed by: FAMILY MEDICINE

## 2023-01-19 NOTE — PROGRESS NOTES
Chief Complaint  Nasal Congestion and Cough ( positive for covid on 1/11/23 patient neg on 1/14/23 )    Subjective          Kelsy Rider presents to [facility]  History of Present Illness   had recently tested positive for COVID  Went to urgent care Saturday and was negative  In today with nasal congestion and cough  No significant shortness of breath  Dates that she is already starting to feel better.    Current Outpatient Medications   Medication Sig Dispense Refill   • alendronate (FOSAMAX) 70 MG tablet TAKE 1 TABLET BY MOUTH ONCE WEEKLY ON AN EMPTY STOMACH BEFORE BREAKFAST. REMAIN UPRIGHT FOR 30 MINUTES AND TAKE WITH 8 OUNCES OF WATER 12 tablet 0   • aspirin 81 MG chewable tablet Chew 81 mg Daily.     • atenolol-chlorthalidone (TENORETIC) 50-25 MG per tablet TAKE 1/2 TABLET BY MOUTH TWICE A DAY 90 tablet 0   • atorvastatin (LIPITOR) 20 MG tablet TAKE ONE TABLET BY MOUTH DAILY 90 tablet 1   • BD Pen Needle Ita U/F 32G X 4 MM misc Inject 5 each under the skin into the appropriate area as directed Daily.     • Calcium Carbonate-Vitamin D 600-200 MG-UNIT capsule Calcium 600 + D(3) 600 mg calcium- 200 unit oral capsule take 1 capsule by oral route daily   Active     • coenzyme Q10 100 MG capsule Co Q-10 100 mg oral capsule take 1 capsule by oral route daily   Active     • famotidine (PEPCID) 20 MG tablet Take 1 tablet by mouth Daily.     • glucose blood (Accu-Chek Gricelda Plus) test strip USE TO TEST BLOOD SUGAR THREE TIMES A  each 1   • Lantus SoloStar 100 UNIT/ML injection pen INJECT 66 UNITS UNDER THE SKIN DAILY 18 mL 3   • levothyroxine (SYNTHROID, LEVOTHROID) 125 MCG tablet Take 1 tablet by mouth Daily. 90 tablet 1   • lisinopril (PRINIVIL,ZESTRIL) 40 MG tablet TAKE ONE TABLET BY MOUTH DAILY 90 tablet 1   • metFORMIN (GLUCOPHAGE) 850 MG tablet Take 1 tablet by mouth Daily. 90 tablet 1   • NovoLOG FlexPen 100 UNIT/ML solution pen-injector sc pen INJECT 4  "UNITS UNDER THE SKIN EVERY MORNING AND INJECT 14 UNITS UNDER THE SKIN WITH NOON MEAL, AND INJECT 6 UNITS UNDER THE SKIN WITH EVENING MEAL ALONG WITH SLIDING SCALE   **MAX OF 40 UNITS PER DAY** 21 mL 1   • pregabalin (LYRICA) 75 MG capsule TAKE ONE CAPSULE BY MOUTH THREE TIMES A  capsule 0   • Repatha SureClick solution auto-injector SureClick injection Inject 140 mg under the skin into the appropriate area as directed Every 14 (Fourteen) Days.     • Victoza 18 MG/3ML solution pen-injector injection DIAL AND INJECT UNDER THE SKIN 1.8 MG DAILY 27 mL 1   • Nirmatrelvir&Ritonavir 300/100 (PAXLOVID) 20 x 150 MG & 10 x 100MG tablet therapy pack tablet Take 3 tablets by mouth 2 (Two) Times a Day for 5 days. Hold atorvastatin while on paxlovid and 3 additional days 30 tablet 0     No current facility-administered medications for this visit.       Review of Systems         Objective   Vital Signs:   /48 (BP Location: Left arm, Patient Position: Sitting, Cuff Size: Large Adult)   Pulse 61   Temp 97.8 °F (36.6 °C)   Ht 157.5 cm (62.01\")   Wt 79.9 kg (176 lb 2 oz)   SpO2 96%   BMI 32.21 kg/m²     Physical Exam   No acute distress  No labored breathing, no accessory muscle use  Color is good  Tympanic membranes clear  Oropharynx is clear  No cervical or supraclavicular adenopathy  Heart is regular rate and rhythm out murmur  Lungs with good air movement no wheezes or crackles  Lower extremities without edema      Result Review :                     Assessment and Plan    Diagnoses and all orders for this visit:    1. COVID-19 virus infection (Primary)  Assessment & Plan:  Reviewed CDC isolation guidelines  Informed of signs and symptoms that would indicate a need for urgent medical attention  Reviewed EUA status of Paxlovid and she desires to move forward with treatment  Her drug interaction checked through [place]  We will have her to hold atorvastatin while under treatment and for 3 additional days.  Call " back with any additional concerns    Orders:  -     Nirmatrelvir&Ritonavir 300/100 (PAXLOVID) 20 x 150 MG & 10 x 100MG tablet therapy pack tablet; Take 3 tablets by mouth 2 (Two) Times a Day for 5 days. Hold atorvastatin while on paxlovid and 3 additional days  Dispense: 30 tablet; Refill: 0    2. Cough, unspecified type  -     POCT SARS-CoV-2 Antigen KALYN + Flu    3. Nasal congestion  -     POCT SARS-CoV-2 Antigen KALYN + Flu    4. Type 2 diabetes mellitus with stage 3a chronic kidney disease, with long-term current use of insulin ([facility])  Comments:  Blood sugars have not been adversely affected as of yet.  Continue current regimen    5. Hypertension, essential  Comments:  Blood pressure remains good continue current regimen      Follow Up   No follow-ups on file.  Patient was given instructions and counseling regarding her condition or for health maintenance advice. Please see specific information pulled into the AVS if appropriate.

## 2023-01-21 NOTE — ASSESSMENT & PLAN NOTE
Reviewed CDC isolation guidelines  Informed of signs and symptoms that would indicate a need for urgent medical attention  Reviewed EUA status of Paxlovid and she desires to move forward with treatment  Her drug interaction checked through [place]  We will have her to hold atorvastatin while under treatment and for 3 additional days.  Call back with any additional concerns

## 2023-01-23 RX ORDER — ALENDRONATE SODIUM 70 MG/1
TABLET ORAL
Qty: 12 TABLET | Refills: 1 | Status: SHIPPED | OUTPATIENT
Start: 2023-01-23

## 2023-02-07 RX ORDER — INSULIN ASPART 100 [IU]/ML
INJECTION, SOLUTION INTRAVENOUS; SUBCUTANEOUS
Qty: 21 ML | Refills: 1 | Status: SHIPPED | OUTPATIENT
Start: 2023-02-07

## 2023-02-15 DIAGNOSIS — Z79.899 HIGH RISK MEDICATION USE: ICD-10-CM

## 2023-02-15 DIAGNOSIS — M54.17 LUMBOSACRAL RADICULOPATHY: ICD-10-CM

## 2023-02-15 DIAGNOSIS — I10 ESSENTIAL HYPERTENSION: ICD-10-CM

## 2023-02-16 RX ORDER — LISINOPRIL 40 MG/1
TABLET ORAL
Qty: 90 TABLET | Refills: 1 | Status: SHIPPED | OUTPATIENT
Start: 2023-02-16

## 2023-02-16 RX ORDER — ATENOLOL AND CHLORTHALIDONE TABLET 50; 25 MG/1; MG/1
TABLET ORAL
Qty: 90 TABLET | Refills: 0 | Status: SHIPPED | OUTPATIENT
Start: 2023-02-16

## 2023-02-16 RX ORDER — PREGABALIN 75 MG/1
CAPSULE ORAL
Qty: 270 CAPSULE | Refills: 0 | Status: SHIPPED | OUTPATIENT
Start: 2023-02-16

## 2023-02-22 ENCOUNTER — TELEPHONE (OUTPATIENT)
Dept: FAMILY MEDICINE CLINIC | Age: 73
End: 2023-02-22
Payer: MEDICARE

## 2023-02-22 NOTE — TELEPHONE ENCOUNTER
Spoke w/ pt she got 5 pens with this refill will take care of this at the time of next refill, when change or PA is needed

## 2023-02-22 NOTE — TELEPHONE ENCOUNTER
Caller: Kelsy Rider    Relationship: Self    Best call back number: [phone]    What is the best time to reach you: ANYTIME     Who are you requesting to speak with (clinical staff, provider,  specific staff member): JUAN         What was the call regarding: PATIENT IS CALLING REQUESTING THAT JUAN GIVE HER A CALL REGARDING A LETTER SHE RECEIVED FROM INSURANCE ABOUT NO LONGER COVERING AN INSULIN THAT SHE GETS.     Do you require a callback: YES

## 2023-05-15 DIAGNOSIS — I10 ESSENTIAL HYPERTENSION: ICD-10-CM

## 2023-05-15 RX ORDER — ATENOLOL AND CHLORTHALIDONE TABLET 50; 25 MG/1; MG/1
TABLET ORAL
Qty: 90 TABLET | Refills: 0 | Status: SHIPPED | OUTPATIENT
Start: 2023-05-15

## 2023-05-18 RX ORDER — INSULIN GLARGINE 100 [IU]/ML
INJECTION, SOLUTION SUBCUTANEOUS
Qty: 18 ML | Refills: 3 | Status: SHIPPED | OUTPATIENT
Start: 2023-05-18

## 2023-05-23 RX ORDER — INSULIN ASPART 100 [IU]/ML
INJECTION, SOLUTION INTRAVENOUS; SUBCUTANEOUS
Qty: 15 ML | Refills: 2 | Status: SHIPPED | OUTPATIENT
Start: 2023-05-23

## 2023-06-06 ENCOUNTER — OFFICE VISIT (OUTPATIENT)
Dept: FAMILY MEDICINE CLINIC | Age: 73
End: 2023-06-06
Payer: MEDICARE

## 2023-06-06 ENCOUNTER — LAB (OUTPATIENT)
Dept: LAB | Facility: HOSPITAL | Age: 73
End: 2023-06-06
Payer: MEDICARE

## 2023-06-06 VITALS
HEIGHT: 62 IN | BODY MASS INDEX: 33.38 KG/M2 | DIASTOLIC BLOOD PRESSURE: 66 MMHG | HEART RATE: 76 BPM | OXYGEN SATURATION: 99 % | WEIGHT: 181.4 LBS | SYSTOLIC BLOOD PRESSURE: 141 MMHG

## 2023-06-06 DIAGNOSIS — Z79.4 TYPE 2 DIABETES MELLITUS WITH STAGE 3A CHRONIC KIDNEY DISEASE, WITH LONG-TERM CURRENT USE OF INSULIN: ICD-10-CM

## 2023-06-06 DIAGNOSIS — N32.81 OVERACTIVE BLADDER: ICD-10-CM

## 2023-06-06 DIAGNOSIS — M48.062 SPINAL STENOSIS OF LUMBAR REGION WITH NEUROGENIC CLAUDICATION: ICD-10-CM

## 2023-06-06 DIAGNOSIS — N18.31 TYPE 2 DIABETES MELLITUS WITH STAGE 3A CHRONIC KIDNEY DISEASE, WITH LONG-TERM CURRENT USE OF INSULIN: ICD-10-CM

## 2023-06-06 DIAGNOSIS — E11.22 TYPE 2 DIABETES MELLITUS WITH STAGE 3A CHRONIC KIDNEY DISEASE, WITH LONG-TERM CURRENT USE OF INSULIN: ICD-10-CM

## 2023-06-06 DIAGNOSIS — E78.2 MIXED HYPERLIPIDEMIA: ICD-10-CM

## 2023-06-06 DIAGNOSIS — E03.9 ACQUIRED HYPOTHYROIDISM: ICD-10-CM

## 2023-06-06 DIAGNOSIS — I10 HYPERTENSION, ESSENTIAL: Primary | ICD-10-CM

## 2023-06-06 DIAGNOSIS — Z23 NEED FOR VACCINATION: ICD-10-CM

## 2023-06-06 LAB
ALBUMIN SERPL-MCNC: 4.6 G/DL (ref 3.5–5.2)
ALBUMIN UR-MCNC: <1.2 MG/DL
ALBUMIN/GLOB SERPL: 1.7 G/DL
ALP SERPL-CCNC: 70 U/L (ref 39–117)
ALT SERPL W P-5'-P-CCNC: 23 U/L (ref 1–33)
ANION GAP SERPL CALCULATED.3IONS-SCNC: 9.3 MMOL/L (ref 5–15)
AST SERPL-CCNC: 19 U/L (ref 1–32)
BASOPHILS # BLD AUTO: 0.05 10*3/MM3 (ref 0–0.2)
BASOPHILS NFR BLD AUTO: 0.7 % (ref 0–1.5)
BILIRUB SERPL-MCNC: 0.4 MG/DL (ref 0–1.2)
BUN SERPL-MCNC: 22 MG/DL (ref 8–23)
BUN/CREAT SERPL: 19.1 (ref 7–25)
CALCIUM SPEC-SCNC: 10.3 MG/DL (ref 8.6–10.5)
CHLORIDE SERPL-SCNC: 104 MMOL/L (ref 98–107)
CHOLEST SERPL-MCNC: 169 MG/DL (ref 0–200)
CO2 SERPL-SCNC: 29.7 MMOL/L (ref 22–29)
CREAT SERPL-MCNC: 1.15 MG/DL (ref 0.57–1)
CREAT UR-MCNC: 78.5 MG/DL
DEPRECATED RDW RBC AUTO: 46.8 FL (ref 37–54)
EGFRCR SERPLBLD CKD-EPI 2021: 50.7 ML/MIN/1.73
EOSINOPHIL # BLD AUTO: 0.27 10*3/MM3 (ref 0–0.4)
EOSINOPHIL NFR BLD AUTO: 3.8 % (ref 0.3–6.2)
ERYTHROCYTE [DISTWIDTH] IN BLOOD BY AUTOMATED COUNT: 13.8 % (ref 12.3–15.4)
GLOBULIN UR ELPH-MCNC: 2.7 GM/DL
GLUCOSE SERPL-MCNC: 159 MG/DL (ref 65–99)
HBA1C MFR BLD: 7.5 % (ref 4.8–5.6)
HCT VFR BLD AUTO: 41 % (ref 34–46.6)
HDLC SERPL-MCNC: 35 MG/DL (ref 40–60)
HGB BLD-MCNC: 13.3 G/DL (ref 12–15.9)
IMM GRANULOCYTES # BLD AUTO: 0.01 10*3/MM3 (ref 0–0.05)
IMM GRANULOCYTES NFR BLD AUTO: 0.1 % (ref 0–0.5)
LDLC SERPL CALC-MCNC: 91 MG/DL (ref 0–100)
LDLC/HDLC SERPL: 2.37 {RATIO}
LYMPHOCYTES # BLD AUTO: 1.66 10*3/MM3 (ref 0.7–3.1)
LYMPHOCYTES NFR BLD AUTO: 23.1 % (ref 19.6–45.3)
MCH RBC QN AUTO: 29.6 PG (ref 26.6–33)
MCHC RBC AUTO-ENTMCNC: 32.4 G/DL (ref 31.5–35.7)
MCV RBC AUTO: 91.3 FL (ref 79–97)
MICROALBUMIN/CREAT UR: NORMAL MG/G{CREAT}
MONOCYTES # BLD AUTO: 0.56 10*3/MM3 (ref 0.1–0.9)
MONOCYTES NFR BLD AUTO: 7.8 % (ref 5–12)
NEUTROPHILS NFR BLD AUTO: 4.64 10*3/MM3 (ref 1.7–7)
NEUTROPHILS NFR BLD AUTO: 64.5 % (ref 42.7–76)
PLATELET # BLD AUTO: 186 10*3/MM3 (ref 140–450)
PMV BLD AUTO: 11.2 FL (ref 6–12)
POTASSIUM SERPL-SCNC: 5 MMOL/L (ref 3.5–5.2)
PROT SERPL-MCNC: 7.3 G/DL (ref 6–8.5)
RBC # BLD AUTO: 4.49 10*6/MM3 (ref 3.77–5.28)
SODIUM SERPL-SCNC: 143 MMOL/L (ref 136–145)
T4 FREE SERPL-MCNC: 1.46 NG/DL (ref 0.93–1.7)
TRIGL SERPL-MCNC: 256 MG/DL (ref 0–150)
TSH SERPL DL<=0.05 MIU/L-ACNC: 0.23 UIU/ML (ref 0.27–4.2)
VLDLC SERPL-MCNC: 43 MG/DL (ref 5–40)
WBC NRBC COR # BLD: 7.19 10*3/MM3 (ref 3.4–10.8)

## 2023-06-06 PROCEDURE — 80053 COMPREHEN METABOLIC PANEL: CPT | Performed by: FAMILY MEDICINE

## 2023-06-06 PROCEDURE — 82570 ASSAY OF URINE CREATININE: CPT | Performed by: FAMILY MEDICINE

## 2023-06-06 PROCEDURE — 83036 HEMOGLOBIN GLYCOSYLATED A1C: CPT | Performed by: FAMILY MEDICINE

## 2023-06-06 PROCEDURE — 85025 COMPLETE CBC W/AUTO DIFF WBC: CPT | Performed by: FAMILY MEDICINE

## 2023-06-06 PROCEDURE — 84439 ASSAY OF FREE THYROXINE: CPT | Performed by: FAMILY MEDICINE

## 2023-06-06 PROCEDURE — 82043 UR ALBUMIN QUANTITATIVE: CPT | Performed by: FAMILY MEDICINE

## 2023-06-06 PROCEDURE — 84443 ASSAY THYROID STIM HORMONE: CPT | Performed by: FAMILY MEDICINE

## 2023-06-06 PROCEDURE — 80061 LIPID PANEL: CPT | Performed by: FAMILY MEDICINE

## 2023-06-06 PROCEDURE — 36415 COLL VENOUS BLD VENIPUNCTURE: CPT | Performed by: FAMILY MEDICINE

## 2023-06-06 RX ORDER — TOLTERODINE 2 MG/1
2 CAPSULE, EXTENDED RELEASE ORAL DAILY
Qty: 30 CAPSULE | Refills: 0 | Status: SHIPPED | OUTPATIENT
Start: 2023-06-06

## 2023-06-06 NOTE — ASSESSMENT & PLAN NOTE
Had a dosage adjustment not too long ago.  We will check labs and predicate further changes based on results

## 2023-06-06 NOTE — ASSESSMENT & PLAN NOTE
Blood pressure borderline elevated.  Usually her blood pressures are very good.  She reports good measurements at home.  We will hold off on making changes today.

## 2023-06-06 NOTE — PROGRESS NOTES
Chief Complaint  Hypertension (6 month follow up ) and Hyperlipidemia    Subjective          Kelsy Rider presents to [facility]  History of Present Illness    She does have hypertension and her initial blood pressure is elevated.  Her typical blood pressure however is quite a bit better.  She does not follow blood pressures at home.    She has diabetes and follows her blood sugars 2 or 3 times a day at home but did not bring her blood sugar log with her today.  She states that her average blood sugar in the morning had typically been in the 120s but lately they have been eating out a lot and her blood sugar might be closer to 150 or so.    As far as her back issue says she is basically doing okay.  She did do some gardening work a couple weeks ago and following that had severe prosodic pain in the left SI joint area but it would just come and go quite fleeting.  Last week she has been okay.    She reports that she has had about 3 episodes where she had some urinary incontinence.  All 3 times though she had been holding her urine and just could not make it to the bathroom in time.  Also states that a long history of urinary frequency and nocturia.  Has thought that she had an overactive bladder for quite some time.  Experiences a sense of urinary urgency frequently.        Current Outpatient Medications   Medication Sig Dispense Refill    alendronate (FOSAMAX) 70 MG tablet TAKE 1 TABLET BY MOUTH ONCE WEEKLY ON AN EMPTY STOMACH BEFORE BREAKFAST. REMAIN UPRIGHT FOR 30 MINUTES AND TAKE WITH 8 OUNCES OF WATER 12 tablet 1    aspirin 81 MG chewable tablet Chew 1 tablet Daily.      atenolol-chlorthalidone (TENORETIC) 50-25 MG per tablet TAKE 1/2 TABLET BY MOUTH TWICE A DAY 90 tablet 0    atorvastatin (LIPITOR) 20 MG tablet TAKE ONE TABLET BY MOUTH DAILY 90 tablet 1    BD Pen Needle Ita U/F 32G X 4 MM misc Inject 5 each under the skin into the appropriate area as directed Daily.       "Calcium Carbonate-Vitamin D 600-200 MG-UNIT capsule Calcium 600 + D(3) 600 mg calcium- 200 unit oral capsule take 1 capsule by oral route daily   Active      coenzyme Q10 100 MG capsule Co Q-10 100 mg oral capsule take 1 capsule by oral route daily   Active      famotidine (PEPCID) 20 MG tablet Take 1 tablet by mouth Daily.      glucose blood (Accu-Chek Gricelda Plus) test strip USE TO TEST BLOOD SUGAR THREE TIMES A  each 1    Lantus SoloStar 100 UNIT/ML injection pen INJECT 66 UNITS UNDER THE SKIN DAILY 18 mL 3    levothyroxine (SYNTHROID, LEVOTHROID) 125 MCG tablet Take 1 tablet by mouth Daily. 90 tablet 1    lisinopril (PRINIVIL,ZESTRIL) 40 MG tablet TAKE ONE TABLET BY MOUTH DAILY 90 tablet 1    metFORMIN (GLUCOPHAGE) 850 MG tablet Take 1 tablet by mouth Daily. 90 tablet 1    NovoLOG FlexPen 100 UNIT/ML solution pen-injector sc pen INJECT 4 UNITS UNDER THE SKIN EVERY MORNING, 14 UNITS UNDER THE SKIN WITH NOON MEAL, AND 6 UNITS UNDER THE SKIN WITH THE EVENING MEAL ALONG WITH SLIDING SCALE. MAX OF 40 UNITS PER DAY 15 mL 2    pregabalin (LYRICA) 75 MG capsule TAKE ONE CAPSULE BY MOUTH THREE TIMES A  capsule 0    Repatha SureClick solution auto-injector SureClick injection Inject 1 mL under the skin into the appropriate area as directed Every 14 (Fourteen) Days.      Victoza 18 MG/3ML solution pen-injector injection DIAL AND INJECT UNDER THE SKIN 1.8 MG DAILY 27 mL 1    tolterodine LA (Detrol LA) 2 MG 24 hr capsule Take 1 capsule by mouth Daily. 30 capsule 0     No current facility-administered medications for this visit.       Review of Systems         Objective   Vital Signs:   /66 (BP Location: Left arm, Patient Position: Sitting, Cuff Size: Adult)   Pulse 76   Ht 157.5 cm (62.01\")   Wt 82.3 kg (181 lb 6.4 oz)   SpO2 99% Comment: room air  BMI 33.17 kg/m²     Physical Exam   Obese  No acute distress  Eyes are nonicteric  Heart is regular rate and rhythm no murmur  Lungs are bilaterally " clear  Abdomen is soft and nontender, in particular no tenderness over the suprapubic area.  Lower extremities without edema  Dorsalis pedis posterior tibial pulses are strong  Monofilament testing feet is 5/5 bilaterally  Feet are without significant callus or ulcerations.    Result Review :                     Assessment and Plan    Diagnoses and all orders for this visit:    1. Hypertension, essential (Primary)  Assessment & Plan:  Blood pressure borderline elevated.  Usually her blood pressures are very good.  She reports good measurements at home.  We will hold off on making changes today.    Orders:  -     Comprehensive Metabolic Panel  -     CBC Auto Differential    2. Mixed hyperlipidemia  Assessment & Plan:  Check labs and predicate medication change based on result    Orders:  -     Lipid Panel    3. Acquired hypothyroidism  Assessment & Plan:  Had a dosage adjustment not too long ago.  We will check labs and predicate further changes based on results    Orders:  -     TSH Rfx On Abnormal To Free T4    4. Type 2 diabetes mellitus with stage 3a chronic kidney disease, with long-term current use of insulin  Assessment & Plan:  Stressed to her the importance bring her blood sugar log to her appointments.  We will check labs and predicate medication changes based on those results.    Orders:  -     Hemoglobin A1c  -     Comprehensive Metabolic Panel  -     CBC Auto Differential  -     Microalbumin / Creatinine Urine Ratio - Urine, Clean Catch    5. Overactive bladder  Assessment & Plan:  Her symptoms sound most consistent with overactive bladder and not as a result of cord impingement.  Did caution her however about the possible presentation of urinary and bowel incontinence as a sign of cord compression.  She would like to try medication for treatment.    Orders:  -     tolterodine LA (Detrol LA) 2 MG 24 hr capsule; Take 1 capsule by mouth Daily.  Dispense: 30 capsule; Refill: 0    6. Need for  vaccination  Comments:  She will get the COVID booster today.  Did inform her that shingles vaccine is now paid for by Medicare at pharmacy  Orders:  -     COVID-19 (Pfizer) Bivalent 12+yrs    7. Spinal stenosis of lumbar region with neurogenic claudication  Assessment & Plan:  For the most part sounds like she doing pretty well with her back issues at the present.  Continue on current regimen .          Follow Up   No follow-ups on file.  Patient was given instructions and counseling regarding her condition or for health maintenance advice. Please see specific information pulled into the AVS if appropriate.

## 2023-06-06 NOTE — ASSESSMENT & PLAN NOTE
Stressed to her the importance bring her blood sugar log to her appointments.  We will check labs and predicate medication changes based on those results.

## 2023-06-06 NOTE — ASSESSMENT & PLAN NOTE
Her symptoms sound most consistent with overactive bladder and not as a result of cord impingement.  Did caution her however about the possible presentation of urinary and bowel incontinence as a sign of cord compression.  She would like to try medication for treatment.

## 2023-06-06 NOTE — ASSESSMENT & PLAN NOTE
For the most part sounds like she doing pretty well with her back issues at the present.  Continue on current regimen .

## 2023-06-08 DIAGNOSIS — N18.31 TYPE 2 DIABETES MELLITUS WITH STAGE 3A CHRONIC KIDNEY DISEASE, WITH LONG-TERM CURRENT USE OF INSULIN: Primary | ICD-10-CM

## 2023-06-08 DIAGNOSIS — E11.65 TYPE 2 DIABETES MELLITUS WITH HYPERGLYCEMIA, WITH LONG-TERM CURRENT USE OF INSULIN: ICD-10-CM

## 2023-06-08 DIAGNOSIS — E11.22 TYPE 2 DIABETES MELLITUS WITH STAGE 3A CHRONIC KIDNEY DISEASE, WITH LONG-TERM CURRENT USE OF INSULIN: Primary | ICD-10-CM

## 2023-06-08 DIAGNOSIS — E03.9 ACQUIRED HYPOTHYROIDISM: Primary | ICD-10-CM

## 2023-06-08 DIAGNOSIS — Z79.4 TYPE 2 DIABETES MELLITUS WITH STAGE 3A CHRONIC KIDNEY DISEASE, WITH LONG-TERM CURRENT USE OF INSULIN: Primary | ICD-10-CM

## 2023-06-08 DIAGNOSIS — Z79.4 TYPE 2 DIABETES MELLITUS WITH HYPERGLYCEMIA, WITH LONG-TERM CURRENT USE OF INSULIN: ICD-10-CM

## 2023-06-08 RX ORDER — LEVOTHYROXINE SODIUM 112 UG/1
112 TABLET ORAL DAILY
Qty: 90 TABLET | Refills: 0 | Status: SHIPPED | OUTPATIENT
Start: 2023-06-08

## 2023-08-12 DIAGNOSIS — I10 ESSENTIAL HYPERTENSION: ICD-10-CM

## 2023-08-13 DIAGNOSIS — I10 ESSENTIAL HYPERTENSION: ICD-10-CM

## 2023-08-14 RX ORDER — LISINOPRIL 40 MG/1
TABLET ORAL
Qty: 90 TABLET | Refills: 1 | Status: SHIPPED | OUTPATIENT
Start: 2023-08-14

## 2023-08-14 RX ORDER — ATENOLOL AND CHLORTHALIDONE TABLET 50; 25 MG/1; MG/1
TABLET ORAL
Qty: 90 TABLET | Refills: 0 | Status: SHIPPED | OUTPATIENT
Start: 2023-08-14

## 2023-08-28 ENCOUNTER — TRANSCRIBE ORDERS (OUTPATIENT)
Dept: ADMINISTRATIVE | Facility: HOSPITAL | Age: 73
End: 2023-08-28
Payer: MEDICARE

## 2023-08-28 DIAGNOSIS — Z12.31 BREAST CANCER SCREENING BY MAMMOGRAM: Primary | ICD-10-CM

## 2023-09-03 DIAGNOSIS — E03.9 ACQUIRED HYPOTHYROIDISM: ICD-10-CM

## 2023-09-07 DIAGNOSIS — Z79.4 TYPE 2 DIABETES MELLITUS WITH STAGE 3A CHRONIC KIDNEY DISEASE, WITH LONG-TERM CURRENT USE OF INSULIN: ICD-10-CM

## 2023-09-07 DIAGNOSIS — E03.9 ACQUIRED HYPOTHYROIDISM: ICD-10-CM

## 2023-09-07 DIAGNOSIS — E78.2 MIXED HYPERLIPIDEMIA: ICD-10-CM

## 2023-09-07 DIAGNOSIS — E11.22 TYPE 2 DIABETES MELLITUS WITH STAGE 3A CHRONIC KIDNEY DISEASE, WITH LONG-TERM CURRENT USE OF INSULIN: ICD-10-CM

## 2023-09-07 DIAGNOSIS — I10 HYPERTENSION, ESSENTIAL: Primary | ICD-10-CM

## 2023-09-07 DIAGNOSIS — N18.31 TYPE 2 DIABETES MELLITUS WITH STAGE 3A CHRONIC KIDNEY DISEASE, WITH LONG-TERM CURRENT USE OF INSULIN: ICD-10-CM

## 2023-09-07 NOTE — TELEPHONE ENCOUNTER
She is due for repeat labs.  Lab order has been placed.  If she does not have enough medication to hold her over until she can get the blood drawn let me know and I will go on and send in her short-term refill    mas

## 2023-09-12 ENCOUNTER — LAB (OUTPATIENT)
Dept: LAB | Facility: HOSPITAL | Age: 73
End: 2023-09-12
Payer: MEDICARE

## 2023-09-12 DIAGNOSIS — E03.9 ACQUIRED HYPOTHYROIDISM: ICD-10-CM

## 2023-09-12 DIAGNOSIS — I10 HYPERTENSION, ESSENTIAL: ICD-10-CM

## 2023-09-12 DIAGNOSIS — E78.2 MIXED HYPERLIPIDEMIA: ICD-10-CM

## 2023-09-12 DIAGNOSIS — Z79.4 TYPE 2 DIABETES MELLITUS WITH STAGE 3A CHRONIC KIDNEY DISEASE, WITH LONG-TERM CURRENT USE OF INSULIN: ICD-10-CM

## 2023-09-12 DIAGNOSIS — N18.31 TYPE 2 DIABETES MELLITUS WITH STAGE 3A CHRONIC KIDNEY DISEASE, WITH LONG-TERM CURRENT USE OF INSULIN: ICD-10-CM

## 2023-09-12 DIAGNOSIS — E11.22 TYPE 2 DIABETES MELLITUS WITH STAGE 3A CHRONIC KIDNEY DISEASE, WITH LONG-TERM CURRENT USE OF INSULIN: ICD-10-CM

## 2023-09-12 LAB
ALBUMIN SERPL-MCNC: 4.4 G/DL (ref 3.5–5.2)
ALBUMIN/GLOB SERPL: 1.6 G/DL
ALP SERPL-CCNC: 79 U/L (ref 39–117)
ALT SERPL W P-5'-P-CCNC: 16 U/L (ref 1–33)
ANION GAP SERPL CALCULATED.3IONS-SCNC: 10.9 MMOL/L (ref 5–15)
AST SERPL-CCNC: 19 U/L (ref 1–32)
BASOPHILS # BLD AUTO: 0.05 10*3/MM3 (ref 0–0.2)
BASOPHILS NFR BLD AUTO: 0.9 % (ref 0–1.5)
BILIRUB SERPL-MCNC: 0.4 MG/DL (ref 0–1.2)
BUN SERPL-MCNC: 27 MG/DL (ref 8–23)
BUN/CREAT SERPL: 24.3 (ref 7–25)
CALCIUM SPEC-SCNC: 9.2 MG/DL (ref 8.6–10.5)
CHLORIDE SERPL-SCNC: 104 MMOL/L (ref 98–107)
CHOLEST SERPL-MCNC: 163 MG/DL (ref 0–200)
CO2 SERPL-SCNC: 29.1 MMOL/L (ref 22–29)
CREAT SERPL-MCNC: 1.11 MG/DL (ref 0.57–1)
DEPRECATED RDW RBC AUTO: 45 FL (ref 37–54)
EGFRCR SERPLBLD CKD-EPI 2021: 52.9 ML/MIN/1.73
EOSINOPHIL # BLD AUTO: 0.24 10*3/MM3 (ref 0–0.4)
EOSINOPHIL NFR BLD AUTO: 4.4 % (ref 0.3–6.2)
ERYTHROCYTE [DISTWIDTH] IN BLOOD BY AUTOMATED COUNT: 13.4 % (ref 12.3–15.4)
GLOBULIN UR ELPH-MCNC: 2.7 GM/DL
GLUCOSE SERPL-MCNC: 203 MG/DL (ref 65–99)
HBA1C MFR BLD: 6.9 % (ref 4.8–5.6)
HCT VFR BLD AUTO: 38.4 % (ref 34–46.6)
HDLC SERPL-MCNC: 34 MG/DL (ref 40–60)
HGB BLD-MCNC: 12.8 G/DL (ref 12–15.9)
IMM GRANULOCYTES # BLD AUTO: 0 10*3/MM3 (ref 0–0.05)
IMM GRANULOCYTES NFR BLD AUTO: 0 % (ref 0–0.5)
LDLC SERPL CALC-MCNC: 99 MG/DL (ref 0–100)
LDLC/HDLC SERPL: 2.79 {RATIO}
LYMPHOCYTES # BLD AUTO: 1.69 10*3/MM3 (ref 0.7–3.1)
LYMPHOCYTES NFR BLD AUTO: 31.1 % (ref 19.6–45.3)
MCH RBC QN AUTO: 30 PG (ref 26.6–33)
MCHC RBC AUTO-ENTMCNC: 33.3 G/DL (ref 31.5–35.7)
MCV RBC AUTO: 89.9 FL (ref 79–97)
MONOCYTES # BLD AUTO: 0.5 10*3/MM3 (ref 0.1–0.9)
MONOCYTES NFR BLD AUTO: 9.2 % (ref 5–12)
NEUTROPHILS NFR BLD AUTO: 2.95 10*3/MM3 (ref 1.7–7)
NEUTROPHILS NFR BLD AUTO: 54.4 % (ref 42.7–76)
PLATELET # BLD AUTO: 177 10*3/MM3 (ref 140–450)
PMV BLD AUTO: 10.8 FL (ref 6–12)
POTASSIUM SERPL-SCNC: 4.2 MMOL/L (ref 3.5–5.2)
PROT SERPL-MCNC: 7.1 G/DL (ref 6–8.5)
RBC # BLD AUTO: 4.27 10*6/MM3 (ref 3.77–5.28)
SODIUM SERPL-SCNC: 144 MMOL/L (ref 136–145)
TRIGL SERPL-MCNC: 170 MG/DL (ref 0–150)
TSH SERPL DL<=0.05 MIU/L-ACNC: 0.38 UIU/ML (ref 0.27–4.2)
VLDLC SERPL-MCNC: 30 MG/DL (ref 5–40)
WBC NRBC COR # BLD: 5.43 10*3/MM3 (ref 3.4–10.8)

## 2023-09-12 PROCEDURE — 83036 HEMOGLOBIN GLYCOSYLATED A1C: CPT

## 2023-09-12 PROCEDURE — 84443 ASSAY THYROID STIM HORMONE: CPT

## 2023-09-12 PROCEDURE — 85025 COMPLETE CBC W/AUTO DIFF WBC: CPT

## 2023-09-12 PROCEDURE — 80061 LIPID PANEL: CPT

## 2023-09-12 PROCEDURE — 36415 COLL VENOUS BLD VENIPUNCTURE: CPT

## 2023-09-12 PROCEDURE — 80053 COMPREHEN METABOLIC PANEL: CPT

## 2023-09-12 NOTE — TELEPHONE ENCOUNTER
Inf pt, she will come now and have labs drawn   Vaccine Information Sheet (VIS) provided-VIS date: 8/15/19/Risks/benefits discussed with patient/surrogate

## 2023-09-14 RX ORDER — LEVOTHYROXINE SODIUM 112 UG/1
112 TABLET ORAL DAILY
Qty: 90 TABLET | Refills: 0 | Status: SHIPPED | OUTPATIENT
Start: 2023-09-14

## 2023-09-14 RX ORDER — INSULIN GLARGINE 100 [IU]/ML
INJECTION, SOLUTION SUBCUTANEOUS
Qty: 18 ML | Refills: 3 | Status: SHIPPED | OUTPATIENT
Start: 2023-09-14

## 2023-09-25 ENCOUNTER — HOSPITAL ENCOUNTER (OUTPATIENT)
Dept: MAMMOGRAPHY | Facility: HOSPITAL | Age: 73
Discharge: HOME | End: 2023-09-25
Admitting: FAMILY MEDICINE
Payer: MEDICARE

## 2023-09-25 DIAGNOSIS — Z12.31 BREAST CANCER SCREENING BY MAMMOGRAM: ICD-10-CM

## 2023-09-25 PROCEDURE — 77063 BREAST TOMOSYNTHESIS BI: CPT

## 2023-09-25 PROCEDURE — 77067 SCR MAMMO BI INCL CAD: CPT

## 2023-10-02 ENCOUNTER — CLINICAL SUPPORT (OUTPATIENT)
Dept: FAMILY MEDICINE CLINIC | Age: 73
End: 2023-10-02
Payer: MEDICARE

## 2023-10-02 DIAGNOSIS — Z23 FLU VACCINE NEED: Primary | ICD-10-CM

## 2023-10-02 PROCEDURE — 90662 IIV NO PRSV INCREASED AG IM: CPT | Performed by: FAMILY MEDICINE

## 2023-10-02 PROCEDURE — G0008 ADMIN INFLUENZA VIRUS VAC: HCPCS | Performed by: FAMILY MEDICINE

## 2023-10-12 DIAGNOSIS — Z79.899 HIGH RISK MEDICATION USE: ICD-10-CM

## 2023-10-12 DIAGNOSIS — M54.17 LUMBOSACRAL RADICULOPATHY: ICD-10-CM

## 2023-10-12 RX ORDER — PREGABALIN 75 MG/1
CAPSULE ORAL
Qty: 90 CAPSULE | Refills: 0 | Status: SHIPPED | OUTPATIENT
Start: 2023-10-12

## 2023-11-06 RX ORDER — INSULIN ASPART 100 [IU]/ML
INJECTION, SOLUTION INTRAVENOUS; SUBCUTANEOUS
Qty: 15 ML | Refills: 2 | Status: SHIPPED | OUTPATIENT
Start: 2023-11-06

## 2023-11-11 DIAGNOSIS — I10 ESSENTIAL HYPERTENSION: ICD-10-CM

## 2023-11-13 RX ORDER — ATENOLOL AND CHLORTHALIDONE TABLET 50; 25 MG/1; MG/1
TABLET ORAL
Qty: 90 TABLET | Refills: 0 | Status: SHIPPED | OUTPATIENT
Start: 2023-11-13

## 2023-11-14 DIAGNOSIS — Z79.4 TYPE 2 DIABETES MELLITUS WITH STAGE 3A CHRONIC KIDNEY DISEASE, WITH LONG-TERM CURRENT USE OF INSULIN: ICD-10-CM

## 2023-11-14 DIAGNOSIS — E11.22 TYPE 2 DIABETES MELLITUS WITH STAGE 3A CHRONIC KIDNEY DISEASE, WITH LONG-TERM CURRENT USE OF INSULIN: ICD-10-CM

## 2023-11-14 DIAGNOSIS — N18.31 TYPE 2 DIABETES MELLITUS WITH STAGE 3A CHRONIC KIDNEY DISEASE, WITH LONG-TERM CURRENT USE OF INSULIN: ICD-10-CM

## 2023-11-14 RX ORDER — LIRAGLUTIDE 6 MG/ML
1.8 INJECTION SUBCUTANEOUS DAILY
Qty: 27 ML | Refills: 1 | Status: SHIPPED | OUTPATIENT
Start: 2023-11-14

## 2023-11-14 RX ORDER — BLOOD SUGAR DIAGNOSTIC
STRIP MISCELLANEOUS
Qty: 300 EACH | Refills: 0 | Status: SHIPPED | OUTPATIENT
Start: 2023-11-14

## 2023-11-14 NOTE — TELEPHONE ENCOUNTER
Caller: Kelsy Rider    Relationship: Self    Best call back number: [phone]    Requested Prescriptions:   Requested Prescriptions     Pending Prescriptions Disp Refills    Accu-Chek Gricelda Plus test strip [Pharmacy Med Name: ACCU-CHEK GRICELDA PLUS TEST STRP]       Sig: USE TO TEST BLOOD SUGAR THREE TIMES A DAY    Liraglutide (Victoza) 18 MG/3ML solution pen-injector injection 27 mL 1        Pharmacy where request should be sent: [facility] [id number] [address] [place] [facility] - [phone]  - [phone] FX     Last office visit with prescribing clinician: 6/6/2023   Last telemedicine visit with prescribing clinician: Visit date not found   Next office visit with prescribing clinician: 12/5/2023     Additional details provided by patient:     Does the patient have less than a 3 day supply:  [] Yes  [x] No    Would you like a call back once the refill request has been completed: [] Yes [x] No    If the office needs to give you a call back, can they leave a voicemail: [] Yes [x] No    Thomas Vaughn Rep   11/14/23 10:52 EST

## 2023-11-15 ENCOUNTER — CLINICAL SUPPORT (OUTPATIENT)
Dept: FAMILY MEDICINE CLINIC | Age: 73
End: 2023-11-15
Payer: MEDICARE

## 2023-11-15 DIAGNOSIS — Z79.899 HIGH RISK MEDICATION USE: Primary | ICD-10-CM

## 2023-11-16 DIAGNOSIS — Z79.899 HIGH RISK MEDICATION USE: ICD-10-CM

## 2023-11-16 DIAGNOSIS — M54.17 LUMBOSACRAL RADICULOPATHY: ICD-10-CM

## 2023-11-16 RX ORDER — PREGABALIN 75 MG/1
75 CAPSULE ORAL 3 TIMES DAILY
Qty: 90 CAPSULE | Refills: 0 | Status: SHIPPED | OUTPATIENT
Start: 2023-11-16

## 2023-12-01 RX ORDER — BLOOD SUGAR DIAGNOSTIC
STRIP MISCELLANEOUS
Qty: 300 EACH | Refills: 0 | Status: SHIPPED | OUTPATIENT
Start: 2023-12-01

## 2023-12-06 RX ORDER — INSULIN ASPART 100 [IU]/ML
INJECTION, SOLUTION INTRAVENOUS; SUBCUTANEOUS
Qty: 12 ML | Refills: 1 | Status: SHIPPED | OUTPATIENT
Start: 2023-12-06

## 2023-12-14 ENCOUNTER — OFFICE VISIT (OUTPATIENT)
Dept: FAMILY MEDICINE CLINIC | Age: 73
End: 2023-12-14
Payer: MEDICARE

## 2023-12-14 VITALS
HEART RATE: 69 BPM | TEMPERATURE: 98.1 F | SYSTOLIC BLOOD PRESSURE: 130 MMHG | WEIGHT: 174.2 LBS | BODY MASS INDEX: 32.06 KG/M2 | DIASTOLIC BLOOD PRESSURE: 61 MMHG | HEIGHT: 62 IN

## 2023-12-14 DIAGNOSIS — I10 HYPERTENSION, ESSENTIAL: Primary | ICD-10-CM

## 2023-12-14 DIAGNOSIS — N18.31 TYPE 2 DIABETES MELLITUS WITH STAGE 3A CHRONIC KIDNEY DISEASE, WITH LONG-TERM CURRENT USE OF INSULIN: ICD-10-CM

## 2023-12-14 DIAGNOSIS — M48.062 SPINAL STENOSIS OF LUMBAR REGION WITH NEUROGENIC CLAUDICATION: ICD-10-CM

## 2023-12-14 DIAGNOSIS — E03.9 ACQUIRED HYPOTHYROIDISM: ICD-10-CM

## 2023-12-14 DIAGNOSIS — E11.22 TYPE 2 DIABETES MELLITUS WITH STAGE 3A CHRONIC KIDNEY DISEASE, WITH LONG-TERM CURRENT USE OF INSULIN: ICD-10-CM

## 2023-12-14 DIAGNOSIS — E78.2 MIXED HYPERLIPIDEMIA: ICD-10-CM

## 2023-12-14 DIAGNOSIS — N32.81 OVERACTIVE BLADDER: ICD-10-CM

## 2023-12-14 DIAGNOSIS — Z23 NEED FOR VACCINATION: ICD-10-CM

## 2023-12-14 DIAGNOSIS — Z79.4 TYPE 2 DIABETES MELLITUS WITH STAGE 3A CHRONIC KIDNEY DISEASE, WITH LONG-TERM CURRENT USE OF INSULIN: ICD-10-CM

## 2023-12-14 RX ORDER — EZETIMIBE 10 MG/1
10 TABLET ORAL DAILY
COMMUNITY
Start: 2023-09-19 | End: 2024-09-18

## 2023-12-14 NOTE — PROGRESS NOTES
Chief Complaint  Hyperlipidemia, Hypertension, Hypothyroidism, Diabetes, and Medicare Wellness-subsequent    Subjective          Kelsy Rider presents to [facility]  History of Present Illness    Her blood pressure looks good today.  She follows her blood pressure at home but forgot to bring her log with her.  She says her numbers at home are good likewise.    She is following her blood sugars 3 times a day.  Did not bring in a log of blood sugar readings.  She thinks that 154 might have been her highest reading until last week and was on vacation and enjoyed herself.  Unfortunately she says Victoza is gone up and expense to $300 a month and she cannot afford that.  She is unwilling to take the Victoza anymore.    She says that her back is doing fine since she had her surgery.      At her visit in June we started her on Detrol to address overactive bladder issues.  She says that something must have malfunction with the prescription because she never received it.  She says her symptoms are not quite as bad now anyway because she gave up soft drinks.  So she is going to hold off on adding on the Detrol at this time.    She says her insurance also is no longer paying for Repatha so Dr. Edward switched her to Zetia.      Current Outpatient Medications on File Prior to Visit   Medication Sig Dispense Refill    alendronate (FOSAMAX) 70 MG tablet TAKE 1 TABLET BY MOUTH ONCE WEEKLY ON AN EMPTY STOMACH BEFORE BREAKFAST. REMAIN UPRIGHT FOR 30 MINUTES AND TAKE WITH 8 OUNCES OF WATER 12 tablet 1    aspirin 81 MG chewable tablet Chew 1 tablet Daily.      atenolol-chlorthalidone (TENORETIC) 50-25 MG per tablet TAKE 1/2 TABLET BY MOUTH TWICE A DAY 90 tablet 0    atorvastatin (LIPITOR) 20 MG tablet TAKE ONE TABLET BY MOUTH DAILY 90 tablet 1    BD Pen Needle Ita U/F 32G X 4 MM misc USE AS DIRECTED WITH VICTOZA, LANTUS AND NOVOLOG 200 each 3    Calcium Carbonate-Vitamin D 600-200 MG-UNIT capsule  "Calcium 600 + D(3) 600 mg calcium- 200 unit oral capsule take 1 capsule by oral route daily   Active      coenzyme Q10 100 MG capsule Co Q-10 100 mg oral capsule take 1 capsule by oral route daily   Active      ezetimibe (ZETIA) 10 MG tablet Take 1 tablet by mouth Daily.      famotidine (PEPCID) 20 MG tablet Take 1 tablet by mouth Daily.      glucose blood (Accu-Chek Gricelda Plus) test strip USE TO TEST BLOOD SUGAR THREE TIMES A  each 0    insulin aspart (NovoLOG FlexPen) 100 UNIT/ML solution pen-injector sc pen INJECT 4 UNITS UNDER THE SKIN EVERY MORNING, 14 UNITS UNDER THE SKIN WITH NOON MEAL, AND 6 UNITS UNDER THE SKIN WITH THE EVENING MEAL ALONG WITH SLIDING SCALE. MAX OF 40 UNITS PER DAY 12 mL 1    Lantus SoloStar 100 UNIT/ML injection pen INJECT 66 UNITS UNDER THE SKIN DAILY 18 mL 3    levothyroxine (SYNTHROID, LEVOTHROID) 112 MCG tablet TAKE 1 TABLET BY MOUTH DAILY 90 tablet 0    lisinopril (PRINIVIL,ZESTRIL) 40 MG tablet TAKE ONE TABLET BY MOUTH DAILY 90 tablet 1    metFORMIN (GLUCOPHAGE) 850 MG tablet TAKE ONE TABLET BY MOUTH DAILY 90 tablet 1    pregabalin (LYRICA) 75 MG capsule Take 1 capsule by mouth 3 (Three) Times a Day. 90 capsule 0    [DISCONTINUED] tolterodine LA (Detrol LA) 2 MG 24 hr capsule Take 1 capsule by mouth Daily. 30 capsule 0    [DISCONTINUED] Liraglutide (Victoza) 18 MG/3ML solution pen-injector injection Inject 1.8 mg under the skin into the appropriate area as directed Daily. 27 mL 1    [DISCONTINUED] Repatha SureClick solution auto-injector SureClick injection Inject 1 mL under the skin into the appropriate area as directed Every 14 (Fourteen) Days.       No current facility-administered medications on file prior to visit.       Review of Systems         Objective   Vital Signs:   /61 (BP Location: Left arm, Patient Position: Sitting)   Pulse 69   Temp 98.1 °F (36.7 °C) (Oral)   Ht 157.5 cm (62.01\")   Wt 79 kg (174 lb 3.2 oz)   BMI 31.85 kg/m²     Physical Exam     No " acute distress  Obese  Color is good  Tympanic membranes clear  Oropharynx clear  No cervical or supraclavicular adenopathy  Heart regular rate and rhythm  Lungs clear  Lower extremities had edema      Result Review :                     Assessment and Plan    Diagnoses and all orders for this visit:    1. Hypertension, essential (Primary)  Assessment & Plan:  Blood pressure looks good continue current regimen    Orders:  -     Comprehensive Metabolic Panel; Future  -     CBC Auto Differential; Future    2. Mixed hyperlipidemia  Assessment & Plan:  Check lab predicate medication change based on result    Orders:  -     Lipid Panel; Future  -     Comprehensive Metabolic Panel; Future    3. Acquired hypothyroidism    4. Type 2 diabetes mellitus with stage 3a chronic kidney disease, with long-term current use of insulin  Assessment & Plan:  Very frustrating that she is running into all the obstacles from her insurance company.  I am concerned what could happen to her blood sugar control.  She should continue to monitor let us know if experiencing extreme elevations.  Would like for her to get lab work in February so we can see how her hemoglobin A1c is doing.  Ultimately, improving her diet and getting weight under better control is of paramount importance    Orders:  -     Hemoglobin A1c; Future    5. Spinal stenosis of lumbar region with neurogenic claudication  Assessment & Plan:  Doing fine regards to her spinal stenosis      6. Overactive bladder  Assessment & Plan:  At this point she prefers to hold off on any additional medications.      7. Need for vaccination  -     COVID-19 F23 (Pfizer) 12yrs+ (COMIRNATY)      I spent 30 minutes caring for Kelsy on this date of service. This time includes time spent by me in the following activities:preparing for the visit, reviewing tests, performing a medically appropriate examination and/or evaluation , counseling and educating the patient/family/caregiver, ordering  medications, tests, or procedures, and documenting information in the medical record  Follow Up   No follow-ups on file.  Patient was given instructions and counseling regarding her condition or for health maintenance advice. Please see specific information pulled into the AVS if appropriate.

## 2023-12-14 NOTE — ASSESSMENT & PLAN NOTE
Very frustrating that she is running into all the obstacles from her insurance company.  I am concerned what could happen to her blood sugar control.  She should continue to monitor let us know if experiencing extreme elevations.  Would like for her to get lab work in February so we can see how her hemoglobin A1c is doing.  Ultimately, improving her diet and getting weight under better control is of paramount importance

## 2023-12-15 RX ORDER — LEVOTHYROXINE SODIUM 112 UG/1
112 TABLET ORAL DAILY
Qty: 90 TABLET | Refills: 0 | Status: SHIPPED | OUTPATIENT
Start: 2023-12-15

## 2023-12-18 DIAGNOSIS — Z79.899 HIGH RISK MEDICATION USE: ICD-10-CM

## 2023-12-18 DIAGNOSIS — M54.17 LUMBOSACRAL RADICULOPATHY: ICD-10-CM

## 2023-12-21 DIAGNOSIS — M54.17 LUMBOSACRAL RADICULOPATHY: ICD-10-CM

## 2023-12-21 DIAGNOSIS — Z79.899 HIGH RISK MEDICATION USE: ICD-10-CM

## 2023-12-21 RX ORDER — PREGABALIN 75 MG/1
75 CAPSULE ORAL 3 TIMES DAILY
Qty: 90 CAPSULE | Refills: 2 | Status: SHIPPED | OUTPATIENT
Start: 2023-12-21

## 2023-12-21 RX ORDER — PREGABALIN 75 MG/1
75 CAPSULE ORAL 3 TIMES DAILY
Qty: 90 CAPSULE | Refills: 0 | OUTPATIENT
Start: 2023-12-21

## 2023-12-28 RX ORDER — ALENDRONATE SODIUM 70 MG/1
TABLET ORAL
Qty: 12 TABLET | Refills: 1 | Status: SHIPPED | OUTPATIENT
Start: 2023-12-28

## 2024-01-02 RX ORDER — INSULIN GLARGINE 100 [IU]/ML
INJECTION, SOLUTION SUBCUTANEOUS
Qty: 18 ML | Refills: 3 | Status: SHIPPED | OUTPATIENT
Start: 2024-01-02

## 2024-01-06 DIAGNOSIS — N18.31 TYPE 2 DIABETES MELLITUS WITH STAGE 3A CHRONIC KIDNEY DISEASE, WITH LONG-TERM CURRENT USE OF INSULIN: ICD-10-CM

## 2024-01-06 DIAGNOSIS — E78.2 MIXED HYPERLIPIDEMIA: ICD-10-CM

## 2024-01-06 DIAGNOSIS — Z79.4 TYPE 2 DIABETES MELLITUS WITH STAGE 3A CHRONIC KIDNEY DISEASE, WITH LONG-TERM CURRENT USE OF INSULIN: ICD-10-CM

## 2024-01-06 DIAGNOSIS — E11.22 TYPE 2 DIABETES MELLITUS WITH STAGE 3A CHRONIC KIDNEY DISEASE, WITH LONG-TERM CURRENT USE OF INSULIN: ICD-10-CM

## 2024-01-08 RX ORDER — ATORVASTATIN CALCIUM 20 MG/1
20 TABLET, FILM COATED ORAL DAILY
Qty: 90 TABLET | Refills: 1 | Status: SHIPPED | OUTPATIENT
Start: 2024-01-08

## 2024-02-11 DIAGNOSIS — I10 ESSENTIAL HYPERTENSION: ICD-10-CM

## 2024-02-13 ENCOUNTER — LAB (OUTPATIENT)
Dept: LAB | Facility: HOSPITAL | Age: 74
End: 2024-02-13
Payer: MEDICARE

## 2024-02-13 ENCOUNTER — OFFICE VISIT (OUTPATIENT)
Dept: FAMILY MEDICINE CLINIC | Age: 74
End: 2024-02-13
Payer: MEDICARE

## 2024-02-13 ENCOUNTER — HOSPITAL ENCOUNTER (OUTPATIENT)
Dept: GENERAL RADIOLOGY | Facility: HOSPITAL | Age: 74
Discharge: HOME | End: 2024-02-13
Payer: MEDICARE

## 2024-02-13 VITALS
BODY MASS INDEX: 31.98 KG/M2 | DIASTOLIC BLOOD PRESSURE: 60 MMHG | SYSTOLIC BLOOD PRESSURE: 135 MMHG | WEIGHT: 173.8 LBS | TEMPERATURE: 98.3 F | HEART RATE: 65 BPM | HEIGHT: 62 IN

## 2024-02-13 DIAGNOSIS — N18.31 TYPE 2 DIABETES MELLITUS WITH STAGE 3A CHRONIC KIDNEY DISEASE, WITH LONG-TERM CURRENT USE OF INSULIN: ICD-10-CM

## 2024-02-13 DIAGNOSIS — E11.22 TYPE 2 DIABETES MELLITUS WITH STAGE 3A CHRONIC KIDNEY DISEASE, WITH LONG-TERM CURRENT USE OF INSULIN: ICD-10-CM

## 2024-02-13 DIAGNOSIS — Z79.4 TYPE 2 DIABETES MELLITUS WITH STAGE 3A CHRONIC KIDNEY DISEASE, WITH LONG-TERM CURRENT USE OF INSULIN: ICD-10-CM

## 2024-02-13 DIAGNOSIS — M48.062 SPINAL STENOSIS OF LUMBAR REGION WITH NEUROGENIC CLAUDICATION: Primary | ICD-10-CM

## 2024-02-13 DIAGNOSIS — M54.50 ACUTE BILATERAL LOW BACK PAIN WITHOUT SCIATICA: ICD-10-CM

## 2024-02-13 DIAGNOSIS — E78.2 MIXED HYPERLIPIDEMIA: ICD-10-CM

## 2024-02-13 DIAGNOSIS — I10 HYPERTENSION, ESSENTIAL: ICD-10-CM

## 2024-02-13 LAB
ALBUMIN SERPL-MCNC: 4.2 G/DL (ref 3.5–5.2)
ALBUMIN/GLOB SERPL: 1.6 G/DL
ALP SERPL-CCNC: 75 U/L (ref 39–117)
ALT SERPL W P-5'-P-CCNC: 16 U/L (ref 1–33)
ANION GAP SERPL CALCULATED.3IONS-SCNC: 10.2 MMOL/L (ref 5–15)
AST SERPL-CCNC: 20 U/L (ref 1–32)
BASOPHILS # BLD AUTO: 0.04 10*3/MM3 (ref 0–0.2)
BASOPHILS NFR BLD AUTO: 0.6 % (ref 0–1.5)
BILIRUB SERPL-MCNC: 0.4 MG/DL (ref 0–1.2)
BUN SERPL-MCNC: 19 MG/DL (ref 8–23)
BUN/CREAT SERPL: 17.9 (ref 7–25)
CALCIUM SPEC-SCNC: 9.7 MG/DL (ref 8.6–10.5)
CHLORIDE SERPL-SCNC: 102 MMOL/L (ref 98–107)
CHOLEST SERPL-MCNC: 126 MG/DL (ref 0–200)
CO2 SERPL-SCNC: 28.8 MMOL/L (ref 22–29)
CREAT SERPL-MCNC: 1.06 MG/DL (ref 0.57–1)
DEPRECATED RDW RBC AUTO: 44.9 FL (ref 37–54)
EGFRCR SERPLBLD CKD-EPI 2021: 55.6 ML/MIN/1.73
EOSINOPHIL # BLD AUTO: 0.29 10*3/MM3 (ref 0–0.4)
EOSINOPHIL NFR BLD AUTO: 4.2 % (ref 0.3–6.2)
ERYTHROCYTE [DISTWIDTH] IN BLOOD BY AUTOMATED COUNT: 13.4 % (ref 12.3–15.4)
GLOBULIN UR ELPH-MCNC: 2.7 GM/DL
GLUCOSE SERPL-MCNC: 213 MG/DL (ref 65–99)
HBA1C MFR BLD: 7.1 % (ref 4.8–5.6)
HCT VFR BLD AUTO: 41.8 % (ref 34–46.6)
HDLC SERPL-MCNC: 37 MG/DL (ref 40–60)
HGB BLD-MCNC: 13.6 G/DL (ref 12–15.9)
IMM GRANULOCYTES # BLD AUTO: 0 10*3/MM3 (ref 0–0.05)
IMM GRANULOCYTES NFR BLD AUTO: 0 % (ref 0–0.5)
LDLC SERPL CALC-MCNC: 58 MG/DL (ref 0–100)
LDLC/HDLC SERPL: 1.38 {RATIO}
LYMPHOCYTES # BLD AUTO: 1.55 10*3/MM3 (ref 0.7–3.1)
LYMPHOCYTES NFR BLD AUTO: 22.3 % (ref 19.6–45.3)
MCH RBC QN AUTO: 29.8 PG (ref 26.6–33)
MCHC RBC AUTO-ENTMCNC: 32.5 G/DL (ref 31.5–35.7)
MCV RBC AUTO: 91.5 FL (ref 79–97)
MONOCYTES # BLD AUTO: 0.58 10*3/MM3 (ref 0.1–0.9)
MONOCYTES NFR BLD AUTO: 8.4 % (ref 5–12)
NEUTROPHILS NFR BLD AUTO: 4.48 10*3/MM3 (ref 1.7–7)
NEUTROPHILS NFR BLD AUTO: 64.5 % (ref 42.7–76)
PLATELET # BLD AUTO: 190 10*3/MM3 (ref 140–450)
PMV BLD AUTO: 11.2 FL (ref 6–12)
POTASSIUM SERPL-SCNC: 4.1 MMOL/L (ref 3.5–5.2)
PROT SERPL-MCNC: 6.9 G/DL (ref 6–8.5)
RBC # BLD AUTO: 4.57 10*6/MM3 (ref 3.77–5.28)
SODIUM SERPL-SCNC: 141 MMOL/L (ref 136–145)
TRIGL SERPL-MCNC: 189 MG/DL (ref 0–150)
VLDLC SERPL-MCNC: 31 MG/DL (ref 5–40)
WBC NRBC COR # BLD AUTO: 6.94 10*3/MM3 (ref 3.4–10.8)

## 2024-02-13 PROCEDURE — 85025 COMPLETE CBC W/AUTO DIFF WBC: CPT

## 2024-02-13 PROCEDURE — 80053 COMPREHEN METABOLIC PANEL: CPT

## 2024-02-13 PROCEDURE — 72100 X-RAY EXAM L-S SPINE 2/3 VWS: CPT

## 2024-02-13 PROCEDURE — 36415 COLL VENOUS BLD VENIPUNCTURE: CPT

## 2024-02-13 PROCEDURE — 80061 LIPID PANEL: CPT

## 2024-02-13 PROCEDURE — 83036 HEMOGLOBIN GLYCOSYLATED A1C: CPT

## 2024-02-13 RX ORDER — CYCLOBENZAPRINE HCL 10 MG
TABLET ORAL
Qty: 30 TABLET | Refills: 0 | Status: SHIPPED | OUTPATIENT
Start: 2024-02-13

## 2024-02-13 RX ORDER — ATORVASTATIN CALCIUM 80 MG/1
80 TABLET, FILM COATED ORAL DAILY
Start: 2024-02-13

## 2024-02-14 RX ORDER — LISINOPRIL 40 MG/1
40 TABLET ORAL DAILY
Qty: 90 TABLET | Refills: 1 | Status: SHIPPED | OUTPATIENT
Start: 2024-02-14

## 2024-02-15 RX ORDER — ATENOLOL AND CHLORTHALIDONE TABLET 50; 25 MG/1; MG/1
TABLET ORAL
Qty: 90 TABLET | Refills: 0 | Status: SHIPPED | OUTPATIENT
Start: 2024-02-15

## 2024-02-16 RX ORDER — INSULIN ASPART 100 [IU]/ML
INJECTION, SOLUTION INTRAVENOUS; SUBCUTANEOUS
Qty: 12 ML | Refills: 1 | Status: SHIPPED | OUTPATIENT
Start: 2024-02-16

## 2024-03-12 RX ORDER — BLOOD SUGAR DIAGNOSTIC
STRIP MISCELLANEOUS
Qty: 300 EACH | Refills: 1 | Status: SHIPPED | OUTPATIENT
Start: 2024-03-12 | End: 2024-03-18

## 2024-03-15 DIAGNOSIS — E03.9 ACQUIRED HYPOTHYROIDISM: ICD-10-CM

## 2024-03-15 RX ORDER — LEVOTHYROXINE SODIUM 112 UG/1
112 TABLET ORAL DAILY
Qty: 90 TABLET | Refills: 0 | Status: SHIPPED | OUTPATIENT
Start: 2024-03-15

## 2024-03-18 RX ORDER — BLOOD SUGAR DIAGNOSTIC
STRIP MISCELLANEOUS
Qty: 300 EACH | Refills: 1 | Status: SHIPPED | OUTPATIENT
Start: 2024-03-18

## 2024-03-22 DIAGNOSIS — Z79.899 HIGH RISK MEDICATION USE: ICD-10-CM

## 2024-03-22 DIAGNOSIS — M54.17 LUMBOSACRAL RADICULOPATHY: ICD-10-CM

## 2024-03-24 RX ORDER — PREGABALIN 75 MG/1
75 CAPSULE ORAL 3 TIMES DAILY
Qty: 90 CAPSULE | Refills: 2 | Status: SHIPPED | OUTPATIENT
Start: 2024-03-24

## 2024-04-11 RX ORDER — INSULIN ASPART 100 [IU]/ML
INJECTION, SOLUTION INTRAVENOUS; SUBCUTANEOUS
Qty: 12 ML | Refills: 1 | Status: SHIPPED | OUTPATIENT
Start: 2024-04-11

## 2024-04-22 RX ORDER — INSULIN GLARGINE 100 [IU]/ML
INJECTION, SOLUTION SUBCUTANEOUS
Qty: 18 ML | Refills: 3 | Status: SHIPPED | OUTPATIENT
Start: 2024-04-22

## 2024-04-24 RX ORDER — ALENDRONATE SODIUM 70 MG/1
TABLET ORAL
Qty: 12 TABLET | Refills: 1 | OUTPATIENT
Start: 2024-04-24

## 2024-05-12 DIAGNOSIS — I10 ESSENTIAL HYPERTENSION: ICD-10-CM

## 2024-05-13 RX ORDER — ATENOLOL AND CHLORTHALIDONE TABLET 50; 25 MG/1; MG/1
TABLET ORAL
Qty: 90 TABLET | Refills: 0 | Status: SHIPPED | OUTPATIENT
Start: 2024-05-13

## 2024-05-24 ENCOUNTER — HOSPITAL ENCOUNTER (OUTPATIENT)
Dept: GENERAL RADIOLOGY | Facility: HOSPITAL | Age: 74
Discharge: HOME | End: 2024-05-24
Payer: MEDICARE

## 2024-05-24 ENCOUNTER — LAB (OUTPATIENT)
Dept: LAB | Facility: HOSPITAL | Age: 74
End: 2024-05-24
Payer: MEDICARE

## 2024-05-24 ENCOUNTER — OFFICE VISIT (OUTPATIENT)
Dept: FAMILY MEDICINE CLINIC | Age: 74
End: 2024-05-24
Payer: MEDICARE

## 2024-05-24 VITALS
BODY MASS INDEX: 33.05 KG/M2 | DIASTOLIC BLOOD PRESSURE: 85 MMHG | HEIGHT: 62 IN | WEIGHT: 179.6 LBS | TEMPERATURE: 97.6 F | SYSTOLIC BLOOD PRESSURE: 165 MMHG | OXYGEN SATURATION: 97 % | HEART RATE: 61 BPM

## 2024-05-24 DIAGNOSIS — Z79.4 TYPE 2 DIABETES MELLITUS WITH STAGE 3A CHRONIC KIDNEY DISEASE, WITH LONG-TERM CURRENT USE OF INSULIN: ICD-10-CM

## 2024-05-24 DIAGNOSIS — E03.9 ACQUIRED HYPOTHYROIDISM: ICD-10-CM

## 2024-05-24 DIAGNOSIS — E11.22 TYPE 2 DIABETES MELLITUS WITH STAGE 3A CHRONIC KIDNEY DISEASE, WITH LONG-TERM CURRENT USE OF INSULIN: ICD-10-CM

## 2024-05-24 DIAGNOSIS — I10 HYPERTENSION, ESSENTIAL: ICD-10-CM

## 2024-05-24 DIAGNOSIS — M54.6 ACUTE RIGHT-SIDED THORACIC BACK PAIN: Primary | ICD-10-CM

## 2024-05-24 DIAGNOSIS — R10.811 RIGHT UPPER QUADRANT ABDOMINAL TENDERNESS WITHOUT REBOUND TENDERNESS: ICD-10-CM

## 2024-05-24 DIAGNOSIS — E78.2 MIXED HYPERLIPIDEMIA: ICD-10-CM

## 2024-05-24 DIAGNOSIS — N18.31 TYPE 2 DIABETES MELLITUS WITH STAGE 3A CHRONIC KIDNEY DISEASE, WITH LONG-TERM CURRENT USE OF INSULIN: ICD-10-CM

## 2024-05-24 DIAGNOSIS — R07.89 RIGHT-SIDED CHEST WALL PAIN: ICD-10-CM

## 2024-05-24 LAB
ALBUMIN SERPL-MCNC: 4.4 G/DL (ref 3.5–5.2)
ALBUMIN/GLOB SERPL: 1.7 G/DL
ALP SERPL-CCNC: 71 U/L (ref 39–117)
ALT SERPL W P-5'-P-CCNC: 16 U/L (ref 1–33)
AMYLASE SERPL-CCNC: 43 U/L (ref 28–100)
ANION GAP SERPL CALCULATED.3IONS-SCNC: 8 MMOL/L (ref 5–15)
AST SERPL-CCNC: 16 U/L (ref 1–32)
BACTERIA UR QL AUTO: NORMAL /HPF
BASOPHILS # BLD AUTO: 0.06 10*3/MM3 (ref 0–0.2)
BASOPHILS NFR BLD AUTO: 0.9 % (ref 0–1.5)
BILIRUB SERPL-MCNC: 0.4 MG/DL (ref 0–1.2)
BILIRUB UR QL STRIP: NEGATIVE
BUN SERPL-MCNC: 28 MG/DL (ref 8–23)
BUN/CREAT SERPL: 29.5 (ref 7–25)
CALCIUM SPEC-SCNC: 9.6 MG/DL (ref 8.6–10.5)
CHLORIDE SERPL-SCNC: 105 MMOL/L (ref 98–107)
CHOLEST SERPL-MCNC: 133 MG/DL (ref 0–200)
CLARITY UR: CLEAR
CO2 SERPL-SCNC: 31 MMOL/L (ref 22–29)
COLOR UR: YELLOW
CREAT SERPL-MCNC: 0.95 MG/DL (ref 0.57–1)
DEPRECATED RDW RBC AUTO: 45.5 FL (ref 37–54)
EGFRCR SERPLBLD CKD-EPI 2021: 63.4 ML/MIN/1.73
EOSINOPHIL # BLD AUTO: 0.53 10*3/MM3 (ref 0–0.4)
EOSINOPHIL NFR BLD AUTO: 7.5 % (ref 0.3–6.2)
ERYTHROCYTE [DISTWIDTH] IN BLOOD BY AUTOMATED COUNT: 13.6 % (ref 12.3–15.4)
GLOBULIN UR ELPH-MCNC: 2.6 GM/DL
GLUCOSE SERPL-MCNC: 134 MG/DL (ref 65–99)
GLUCOSE UR STRIP-MCNC: NEGATIVE MG/DL
HBA1C MFR BLD: 7.5 % (ref 4.8–5.6)
HCT VFR BLD AUTO: 40.9 % (ref 34–46.6)
HDLC SERPL-MCNC: 39 MG/DL (ref 40–60)
HGB BLD-MCNC: 13 G/DL (ref 12–15.9)
HGB UR QL STRIP.AUTO: NEGATIVE
IMM GRANULOCYTES # BLD AUTO: 0.02 10*3/MM3 (ref 0–0.05)
IMM GRANULOCYTES NFR BLD AUTO: 0.3 % (ref 0–0.5)
KETONES UR QL STRIP: NEGATIVE
LDLC SERPL CALC-MCNC: 72 MG/DL (ref 0–100)
LDLC/HDLC SERPL: 1.78 {RATIO}
LEUKOCYTE ESTERASE UR QL STRIP.AUTO: NEGATIVE
LIPASE SERPL-CCNC: 40 U/L (ref 13–60)
LYMPHOCYTES # BLD AUTO: 1.6 10*3/MM3 (ref 0.7–3.1)
LYMPHOCYTES NFR BLD AUTO: 22.7 % (ref 19.6–45.3)
MCH RBC QN AUTO: 29 PG (ref 26.6–33)
MCHC RBC AUTO-ENTMCNC: 31.8 G/DL (ref 31.5–35.7)
MCV RBC AUTO: 91.1 FL (ref 79–97)
MONOCYTES # BLD AUTO: 0.68 10*3/MM3 (ref 0.1–0.9)
MONOCYTES NFR BLD AUTO: 9.6 % (ref 5–12)
NEUTROPHILS NFR BLD AUTO: 4.16 10*3/MM3 (ref 1.7–7)
NEUTROPHILS NFR BLD AUTO: 59 % (ref 42.7–76)
NITRITE UR QL STRIP: NEGATIVE
PH UR STRIP.AUTO: 7 [PH] (ref 5–8)
PLATELET # BLD AUTO: 192 10*3/MM3 (ref 140–450)
PMV BLD AUTO: 10.7 FL (ref 6–12)
POTASSIUM SERPL-SCNC: 4.7 MMOL/L (ref 3.5–5.2)
PROT SERPL-MCNC: 7 G/DL (ref 6–8.5)
PROT UR QL STRIP: NEGATIVE
RBC # BLD AUTO: 4.49 10*6/MM3 (ref 3.77–5.28)
RBC # UR STRIP: NORMAL /HPF
REF LAB TEST METHOD: NORMAL
SODIUM SERPL-SCNC: 144 MMOL/L (ref 136–145)
SP GR UR STRIP: 1.01 (ref 1–1.03)
SQUAMOUS #/AREA URNS HPF: NORMAL /HPF
TRIGL SERPL-MCNC: 123 MG/DL (ref 0–150)
TSH SERPL DL<=0.05 MIU/L-ACNC: 1 UIU/ML (ref 0.27–4.2)
UROBILINOGEN UR QL STRIP: NORMAL
VLDLC SERPL-MCNC: 22 MG/DL (ref 5–40)
WBC # UR STRIP: NORMAL /HPF
WBC NRBC COR # BLD AUTO: 7.05 10*3/MM3 (ref 3.4–10.8)

## 2024-05-24 PROCEDURE — 3051F HG A1C>EQUAL 7.0%<8.0%: CPT | Performed by: FAMILY MEDICINE

## 2024-05-24 PROCEDURE — 83036 HEMOGLOBIN GLYCOSYLATED A1C: CPT

## 2024-05-24 PROCEDURE — G2211 COMPLEX E/M VISIT ADD ON: HCPCS | Performed by: FAMILY MEDICINE

## 2024-05-24 PROCEDURE — 1126F AMNT PAIN NOTED NONE PRSNT: CPT | Performed by: FAMILY MEDICINE

## 2024-05-24 PROCEDURE — 36415 COLL VENOUS BLD VENIPUNCTURE: CPT

## 2024-05-24 PROCEDURE — 71101 X-RAY EXAM UNILAT RIBS/CHEST: CPT

## 2024-05-24 PROCEDURE — 81001 URINALYSIS AUTO W/SCOPE: CPT | Performed by: FAMILY MEDICINE

## 2024-05-24 PROCEDURE — 99214 OFFICE O/P EST MOD 30 MIN: CPT | Performed by: FAMILY MEDICINE

## 2024-05-24 PROCEDURE — 85025 COMPLETE CBC W/AUTO DIFF WBC: CPT

## 2024-05-24 PROCEDURE — 83690 ASSAY OF LIPASE: CPT | Performed by: FAMILY MEDICINE

## 2024-05-24 PROCEDURE — 80061 LIPID PANEL: CPT

## 2024-05-24 PROCEDURE — 82150 ASSAY OF AMYLASE: CPT | Performed by: FAMILY MEDICINE

## 2024-05-24 PROCEDURE — 3077F SYST BP >= 140 MM HG: CPT | Performed by: FAMILY MEDICINE

## 2024-05-24 PROCEDURE — 84443 ASSAY THYROID STIM HORMONE: CPT

## 2024-05-24 PROCEDURE — 3079F DIAST BP 80-89 MM HG: CPT | Performed by: FAMILY MEDICINE

## 2024-05-24 PROCEDURE — 80053 COMPREHEN METABOLIC PANEL: CPT

## 2024-05-24 RX ORDER — ATORVASTATIN CALCIUM 80 MG/1
40 TABLET, FILM COATED ORAL DAILY
Qty: 1 TABLET | Refills: 0
Start: 2024-05-24

## 2024-05-24 NOTE — ASSESSMENT & PLAN NOTE
With the right upper quadrant abdominal tenderness will check amylase and lipase.  She is supposed to cholecystectomy.  CBD stone not excluded.

## 2024-05-24 NOTE — ASSESSMENT & PLAN NOTE
As noted above some tenderness to compression of rib cage will check x-ray valuate for possible occult fracture

## 2024-05-24 NOTE — ASSESSMENT & PLAN NOTE
Etiology of her pain is not immediately evident.  Will check x-ray to evaluate the ribs.  Urinalysis was unremarkable.  It does seem likely to be musculoskeletal in nature given the fact that she has tenderness to palpation/compression of the rib cage.

## 2024-05-24 NOTE — ASSESSMENT & PLAN NOTE
Blood pressure is elevated today but she is in pain.  She has a follow-up appointment in 2 or 3 weeks we will hold off on making adjustments today reassess at that time

## 2024-05-24 NOTE — PROGRESS NOTES
Chief Complaint  Back Pain (Combing with the side pain, right side. X3-4 weeks. ) and Flank Pain    Subjective          Kelsy Rider presents to [facility]  History of Present Illness    Patient in reporting that has been having pain in her right back and flank area for about the past 3 weeks or so.  Does not remember a particular injury but has been doing some extra lifting because her  recently broke his wrist.  He also remembers reaching over the top of the refrigerator while standing on a stepladder created some tension on that area.  Cannot sleep, holds her breath keep it from hurting at times.  Her bowels are working okay.  No dysuria but she does have some urinary frequency which she says is basically her baseline.    Not much in the way of cough.  No fever or chills.  Her bladder has been removed.  No prior history of kidney stones    She also reports she was having some leg cramps at nighttime so cut her atorvastatin down to 40 mg and that has been helpful.  She has been doing that for about a month.          Current Outpatient Medications on File Prior to Visit   Medication Sig Dispense Refill    alendronate (FOSAMAX) 70 MG tablet TAKE 1 TABLET BY MOUTH ONCE WEEKLY ON AN EMPTY STOMACH BEFORE BREAKFAST. REMAIN UPRIGHT FOR 30 MINUTES AND TAKE WITH 8 OUNCES OF WATER 12 tablet 1    aspirin 81 MG chewable tablet Chew 1 tablet Daily.      atenolol-chlorthalidone (TENORETIC) 50-25 MG per tablet TAKE 1/2 TABLET BY MOUTH TWICE A DAY 90 tablet 0    BD Pen Needle Ita U/F 32G X 4 MM misc USE AS DIRECTED WITH VICTOZA, LANTUS AND NOVOLOG 200 each 3    Calcium Carbonate-Vitamin D 600-200 MG-UNIT capsule Calcium 600 + D(3) 600 mg calcium- 200 unit oral capsule take 1 capsule by oral route daily   Active      coenzyme Q10 100 MG capsule Co Q-10 100 mg oral capsule take 1 capsule by oral route daily   Active      ezetimibe (ZETIA) 10 MG tablet Take 1 tablet by mouth Daily.       "famotidine (PEPCID) 20 MG tablet Take 1 tablet by mouth Daily.      glucose blood (Accu-Chek Gricelda Plus) test strip USE TO TEST BLOOD SUGAR THREE TIMES A DAY DX E11.22 300 each 1    Lantus SoloStar 100 UNIT/ML injection pen INJECT 66 UNITS UNDER THE SKIN DAILY 18 mL 3    levothyroxine (SYNTHROID, LEVOTHROID) 112 MCG tablet TAKE 1 TABLET BY MOUTH DAILY 90 tablet 0    lisinopril (PRINIVIL,ZESTRIL) 40 MG tablet TAKE 1 TABLET BY MOUTH DAILY 90 tablet 1    metFORMIN (GLUCOPHAGE) 850 MG tablet TAKE 1 TABLET BY MOUTH DAILY 90 tablet 1    NovoLOG FlexPen 100 UNIT/ML solution pen-injector sc pen INJECT 4 UNITS UNDER THE SKIN EVERY MORNING, THEN 14 UNITS WITH NOOON MEAL, THE 6 UNITS WITH EVENING MEAL ALONG WITH SLIDING SCALE MAX 40 UNITS DAILY 12 mL 1    pregabalin (LYRICA) 75 MG capsule TAKE ONE CAPSULE BY MOUTH THREE TIMES A DAY 90 capsule 2    [DISCONTINUED] atorvastatin (LIPITOR) 80 MG tablet Take 1 tablet by mouth Daily. (Patient taking differently: Take 0.5 tablets by mouth Daily.)      cyclobenzaprine (FLEXERIL) 10 MG tablet Take Half or One tablet TID prn for muscle spasm.  May cause drowsiness (Patient not taking: Reported on 5/24/2024) 30 tablet 0     No current facility-administered medications on file prior to visit.       Review of Systems         Objective   Vital Signs:   /85 (BP Location: Left arm, Patient Position: Sitting)   Pulse 61   Temp 97.6 °F (36.4 °C) (Oral)   Ht 157.5 cm (62.01\")   Wt 81.5 kg (179 lb 9.6 oz)   SpO2 97%   BMI 32.84 kg/m²     Physical Exam     No acute distress  Color is good  Eyes are nonicteric  Heart regular rate and rhythm no murmur  Lungs bilaterally clear with good air movement no wheeze or crackles  Abdomen bowel sounds positive, soft, some tenderness right upper quadrant without rebound or guarding, no palpable mass or organomegaly.  The skin on the right flank is without rash of shingles  She does have mild CVA tenderness on the right  Some tenderness to " compression over the lower rib cage right posterior back area with negative bucket-handle test      Result Review :                     Assessment and Plan    Diagnoses and all orders for this visit:    1. Acute right-sided thoracic back pain (Primary)  Assessment & Plan:  Etiology of her pain is not immediately evident.  Will check x-ray to evaluate the ribs.  Urinalysis was unremarkable.  It does seem likely to be musculoskeletal in nature given the fact that she has tenderness to palpation/compression of the rib cage.    Orders:  -     Urinalysis With Culture If Indicated - Urine, Clean Catch  -     Cancel: Urinalysis With Microscopic - Urine, Clean Catch  -     Urinalysis With Microscopic - Urine, Clean Catch    2. Right-sided chest wall pain  Assessment & Plan:  As noted above some tenderness to compression of rib cage will check x-ray valuate for possible occult fracture    Orders:  -     XR Ribs Right With PA Chest; Future    3. Right upper quadrant abdominal tenderness without rebound tenderness  Assessment & Plan:  With the right upper quadrant abdominal tenderness will check amylase and lipase.  She is supposed to cholecystectomy.  CBD stone not excluded.    Orders:  -     CBC Auto Differential; Future  -     Amylase  -     Lipase    4. Hypertension, essential  Assessment & Plan:  Blood pressure is elevated today but she is in pain.  She has a follow-up appointment in 2 or 3 weeks we will hold off on making adjustments today reassess at that time     Orders:  -     Comprehensive Metabolic Panel; Future  -     CBC Auto Differential; Future    5. Mixed hyperlipidemia  Assessment & Plan:  Since we are be drawing blood check labs prior to next appointment     Orders:  -     Lipid Panel; Future  -     Comprehensive Metabolic Panel; Future  -     atorvastatin (LIPITOR) 80 MG tablet; Take 0.5 tablets by mouth Daily.  Dispense: 1 tablet; Refill: 0    6. Type 2 diabetes mellitus with stage 3a chronic kidney  "disease, with long-term current use of insulin  Assessment & Plan:  Check lab since we are drawing blood.  She has appointment about 3 weeks follow-up on the diabetes.  Will predicate medication change based on those results.  She is no longer taking the Victoza, and had been off of metformin for a while because of \"all the bad things that are saying about it\" but has restarted the metformin since her blood pressure is running high.      Orders:  -     Hemoglobin A1c; Future    7. Acquired hypothyroidism  -     TSH Rfx On Abnormal To Free T4; Future        Follow Up   No follow-ups on file.  Patient was given instructions and counseling regarding her condition or for health maintenance advice. Please see specific information pulled into the AVS if appropriate.          "

## 2024-05-24 NOTE — ASSESSMENT & PLAN NOTE
"Check lab since we are drawing blood.  She has appointment about 3 weeks follow-up on the diabetes.  Will predicate medication change based on those results.  She is no longer taking the Victoza, and had been off of metformin for a while because of \"all the bad things that are saying about it\" but has restarted the metformin since her blood pressure is running high.    "

## 2024-05-30 RX ORDER — ALENDRONATE SODIUM 70 MG/1
TABLET ORAL
Qty: 8 TABLET | Refills: 0 | Status: SHIPPED | OUTPATIENT
Start: 2024-05-30

## 2024-05-30 RX ORDER — INSULIN ASPART 100 [IU]/ML
INJECTION, SOLUTION INTRAVENOUS; SUBCUTANEOUS
Qty: 12 ML | Refills: 1 | Status: SHIPPED | OUTPATIENT
Start: 2024-05-30

## 2024-06-14 ENCOUNTER — OFFICE VISIT (OUTPATIENT)
Dept: FAMILY MEDICINE CLINIC | Age: 74
End: 2024-06-14
Payer: MEDICARE

## 2024-06-14 VITALS
OXYGEN SATURATION: 97 % | TEMPERATURE: 98.7 F | WEIGHT: 179.2 LBS | HEART RATE: 68 BPM | DIASTOLIC BLOOD PRESSURE: 45 MMHG | HEIGHT: 62 IN | SYSTOLIC BLOOD PRESSURE: 106 MMHG | BODY MASS INDEX: 32.97 KG/M2

## 2024-06-14 DIAGNOSIS — Z79.4 TYPE 2 DIABETES MELLITUS WITH STAGE 3A CHRONIC KIDNEY DISEASE, WITH LONG-TERM CURRENT USE OF INSULIN: Primary | ICD-10-CM

## 2024-06-14 DIAGNOSIS — E11.22 TYPE 2 DIABETES MELLITUS WITH STAGE 3A CHRONIC KIDNEY DISEASE, WITH LONG-TERM CURRENT USE OF INSULIN: Primary | ICD-10-CM

## 2024-06-14 DIAGNOSIS — E03.9 ACQUIRED HYPOTHYROIDISM: ICD-10-CM

## 2024-06-14 DIAGNOSIS — N18.31 TYPE 2 DIABETES MELLITUS WITH STAGE 3A CHRONIC KIDNEY DISEASE, WITH LONG-TERM CURRENT USE OF INSULIN: Primary | ICD-10-CM

## 2024-06-14 DIAGNOSIS — E78.2 MIXED HYPERLIPIDEMIA: ICD-10-CM

## 2024-06-14 DIAGNOSIS — I10 HYPERTENSION, ESSENTIAL: ICD-10-CM

## 2024-06-14 PROCEDURE — 1126F AMNT PAIN NOTED NONE PRSNT: CPT | Performed by: FAMILY MEDICINE

## 2024-06-14 PROCEDURE — G2211 COMPLEX E/M VISIT ADD ON: HCPCS | Performed by: FAMILY MEDICINE

## 2024-06-14 PROCEDURE — 99214 OFFICE O/P EST MOD 30 MIN: CPT | Performed by: FAMILY MEDICINE

## 2024-06-14 PROCEDURE — 3074F SYST BP LT 130 MM HG: CPT | Performed by: FAMILY MEDICINE

## 2024-06-14 PROCEDURE — 3078F DIAST BP <80 MM HG: CPT | Performed by: FAMILY MEDICINE

## 2024-06-14 PROCEDURE — 3051F HG A1C>EQUAL 7.0%<8.0%: CPT | Performed by: FAMILY MEDICINE

## 2024-06-14 NOTE — PROGRESS NOTES
"Chief Complaint  Hypertension (6 month follow up ), Hyperlipidemia, and Type 2 diabetes mellitus with stage 3a chronic kidney disea    Subjective        Kelsy Rider presents to [facility]  History of Present Illness    Objective   Vital Signs:  /45 (BP Location: Right arm, Patient Position: Sitting, Cuff Size: Adult)   Pulse 68   Temp 98.7 °F (37.1 °C) (Temporal)   Ht 157.5 cm (62.01\")   Wt 81.3 kg (179 lb 3.2 oz)   SpO2 97% Comment: room air  BMI 32.77 kg/m²   Estimated body mass index is 32.77 kg/m² as calculated from the following:    Height as of this encounter: 157.5 cm (62.01\").    Weight as of this encounter: 81.3 kg (179 lb 3.2 oz).             Physical Exam   Result Review :                     Assessment and Plan     Diagnoses and all orders for this visit:    1. Type 2 diabetes mellitus with stage 3a chronic kidney disease, with long-term current use of insulin (Primary)    2. Hypertension, essential    3. Acquired hypothyroidism    4. Mixed hyperlipidemia             Follow Up     No follow-ups on file.  Patient was given instructions and counseling regarding her condition or for health maintenance advice. Please see specific information pulled into the AVS if appropriate.         "

## 2024-06-14 NOTE — PROGRESS NOTES
Chief Complaint  Hypertension (6 month follow up ), Hyperlipidemia, and Type 2 diabetes mellitus with stage 3a chronic kidney disea    Subjective          Kelsy Rider presents to [facility]  History of Present Illness    Her side pain finally went away    BS log shows fluctuations    Came off of victoza at first of the year when Medicare cost got so high for the medication.  Is not had issues with hypoglycemia.    Has come down a little bit in weight since her last visit.  Reemphasized again the importance of weight in regards to fact her underlying health issues.    Tolerating her current medications.      Current Outpatient Medications on File Prior to Visit   Medication Sig Dispense Refill    alendronate (FOSAMAX) 70 MG tablet TAKE 1 TABLET BY MOUTH ONCE WEEKLY ON AN EMPTY STOMACH BEFORE BREAKFAST. REMAIN UPRIGHT FOR 30 MINUTES AND TAKE WITH 8 OUNCES OF WATER 8 tablet 0    aspirin 81 MG chewable tablet Chew 1 tablet Daily.      atenolol-chlorthalidone (TENORETIC) 50-25 MG per tablet TAKE 1/2 TABLET BY MOUTH TWICE A DAY 90 tablet 0    atorvastatin (LIPITOR) 80 MG tablet Take 0.5 tablets by mouth Daily. 1 tablet 0    BD Pen Needle Ita U/F 32G X 4 MM misc USE AS DIRECTED WITH VICTOZA, LANTUS AND NOVOLOG 200 each 3    Calcium Carbonate-Vitamin D 600-200 MG-UNIT capsule Calcium 600 + D(3) 600 mg calcium- 200 unit oral capsule take 1 capsule by oral route daily   Active      coenzyme Q10 100 MG capsule Co Q-10 100 mg oral capsule take 1 capsule by oral route daily   Active      ezetimibe (ZETIA) 10 MG tablet Take 1 tablet by mouth Daily.      famotidine (PEPCID) 20 MG tablet Take 1 tablet by mouth Daily.      glucose blood (Accu-Chek Gricelda Plus) test strip USE TO TEST BLOOD SUGAR THREE TIMES A DAY DX E11.22 300 each 1    Lantus SoloStar 100 UNIT/ML injection pen INJECT 66 UNITS UNDER THE SKIN DAILY 18 mL 3    lisinopril (PRINIVIL,ZESTRIL) 40 MG tablet TAKE 1 TABLET BY MOUTH DAILY  "90 tablet 1    metFORMIN (GLUCOPHAGE) 850 MG tablet TAKE 1 TABLET BY MOUTH DAILY 90 tablet 1    NovoLOG FlexPen 100 UNIT/ML solution pen-injector sc pen INJECT 4 UNITS UNDER THE SKIN EVERY MORNING, THEN 14 UNITS WITH NOON MEAL, THEN 6 UNITS WITH EVENING MEAL ALONG WITH SLIDING SCALE MAX 40 UNITS DAILY 12 mL 1    pregabalin (LYRICA) 75 MG capsule TAKE ONE CAPSULE BY MOUTH THREE TIMES A DAY 90 capsule 2    cyclobenzaprine (FLEXERIL) 10 MG tablet Take Half or One tablet TID prn for muscle spasm.  May cause drowsiness (Patient not taking: Reported on 5/24/2024) 30 tablet 0     No current facility-administered medications on file prior to visit.       Review of Systems         Objective   Vital Signs:   /45 (BP Location: Right arm, Patient Position: Sitting, Cuff Size: Adult)   Pulse 68   Temp 98.7 °F (37.1 °C) (Temporal)   Ht 157.5 cm (62.01\")   Wt 81.3 kg (179 lb 3.2 oz)   SpO2 97% Comment: room air  BMI 32.77 kg/m²     Physical Exam  Constitutional:       Appearance: Normal appearance. She is obese.   HENT:      Right Ear: Tympanic membrane normal.      Left Ear: Tympanic membrane normal.      Mouth/Throat:      Pharynx: No oropharyngeal exudate or posterior oropharyngeal erythema.   Eyes:      General: No scleral icterus.  Neck:      Vascular: No carotid bruit.   Cardiovascular:      Rate and Rhythm: Normal rate and regular rhythm.      Heart sounds: Normal heart sounds. No murmur heard.  Pulmonary:      Effort: No respiratory distress.      Breath sounds: No wheezing or rales.   Musculoskeletal:      Right lower leg: No edema.      Left lower leg: No edema.   Lymphadenopathy:      Cervical: No cervical adenopathy.   Neurological:      General: No focal deficit present.      Mental Status: She is alert.   Psychiatric:         Attention and Perception: Attention normal.         Mood and Affect: Mood normal.         Speech: Speech normal.            Result Review :   The following data was reviewed by: " Jonnie Zuñiga MD on 06/14/2024:    TSH Rfx On Abnormal To Free T4 (05/24/2024 11:25)  CBC Auto Differential (05/24/2024 11:25)  Comprehensive Metabolic Panel (05/24/2024 11:25)  Lipid Panel (05/24/2024 11:25)  Hemoglobin A1c (05/24/2024 11:25)  Lipase (05/24/2024 11:25)  Amylase (05/24/2024 11:25)  Urinalysis With Microscopic - Urine, Clean Catch (05/24/2024 10:36)  Urinalysis With Culture If Indicated - Urine, Clean Catch (05/24/2024 10:36)              Assessment and Plan    Diagnoses and all orders for this visit:    1. Type 2 diabetes mellitus with stage 3a chronic kidney disease, with long-term current use of insulin (Primary)  Assessment & Plan:  Reviewed recent lab.  Hemoglobin A1c borderline at 7.5.  Unfortunate that GLP-1 medications became unaffordable.  Continue on current regimen for now.  Redouble efforts at diet and weight loss.        2. Hypertension, essential  Assessment & Plan:  Blood pressure looks good continue on current regimen       3. Acquired hypothyroidism  Assessment & Plan:  Reviewed recent lab looks good continue on current regimen      4. Mixed hyperlipidemia  Assessment & Plan:  Recent lipid profile look good continue on current regimen           Follow Up   No follow-ups on file.  Patient was given instructions and counseling regarding her condition or for health maintenance advice. Please see specific information pulled into the AVS if appropriate.

## 2024-06-17 RX ORDER — LEVOTHYROXINE SODIUM 112 UG/1
112 TABLET ORAL DAILY
Qty: 90 TABLET | Refills: 0 | Status: SHIPPED | OUTPATIENT
Start: 2024-06-17

## 2024-06-19 RX ORDER — ALENDRONATE SODIUM 70 MG/1
TABLET ORAL
Qty: 8 TABLET | Refills: 0 | OUTPATIENT
Start: 2024-06-19

## 2024-06-19 NOTE — ASSESSMENT & PLAN NOTE
Reviewed recent lab.  Hemoglobin A1c borderline at 7.5.  Unfortunate that GLP-1 medications became unaffordable.  Continue on current regimen for now.  Redouble efforts at diet and weight loss.

## 2024-06-28 DIAGNOSIS — Z79.899 HIGH RISK MEDICATION USE: ICD-10-CM

## 2024-06-28 DIAGNOSIS — M54.17 LUMBOSACRAL RADICULOPATHY: ICD-10-CM

## 2024-06-28 RX ORDER — PREGABALIN 75 MG/1
75 CAPSULE ORAL 3 TIMES DAILY
Qty: 90 CAPSULE | Refills: 2 | Status: SHIPPED | OUTPATIENT
Start: 2024-06-28

## 2024-07-02 ENCOUNTER — TELEPHONE (OUTPATIENT)
Dept: FAMILY MEDICINE CLINIC | Age: 74
End: 2024-07-02
Payer: MEDICARE

## 2024-07-02 NOTE — TELEPHONE ENCOUNTER
Inf pt and Mckay at [facility].  Mckay ran the rx through with a discount card for cash price and it will cost 21.58 for #90 pills.  Pt states that she has neuropathy and she thinks that is why she is on it.  She reports that Dr. Nails started her on it after she had her back surgery due to her feet tingling.  Do you want me to do an appeal on this?  Pt is going to go ahead and get the medication and pay the cash price for it in the meantime.

## 2024-07-02 NOTE — TELEPHONE ENCOUNTER
Why did we deny your request?  We denied this request under Medicare Part D because: The information provided by your prescriber did not   meet the requirements for covering this medication (prior authorization).   Your plan does not allow coverage of this medication based on your prescriber answering No to the following   question(s):  Is the requested drug being prescribed as adjunctive therapy for treatment of partial onset seizures (focalonset seizures)?  Is the requested drug being prescribed for the management of fibromyalgia or the management of neuropathic   pain associated with spinal cord injury?  Is the requested drug being prescribed for any of the following: A) Management of postherpetic neuralgia, B)   Management of neuropathic pain associated with diabetic peripheral neuropathy, C) Cancer-related   neuropathic pain, D) Cancer treatment-related neuropathic pain?  You should share a copy of this decision with your prescriber so you and your prescriber can discuss next steps.   If your prescriber requested coverage on your behalf, we have shared this decision with your prescriber

## 2024-07-02 NOTE — TELEPHONE ENCOUNTER
None of the specific reason's listed seem to apply to her situation.  The cash options probably the only choice she has    mas

## 2024-07-26 RX ORDER — INSULIN ASPART 100 [IU]/ML
INJECTION, SOLUTION INTRAVENOUS; SUBCUTANEOUS
Qty: 12 ML | Refills: 1 | Status: SHIPPED | OUTPATIENT
Start: 2024-07-26

## 2024-08-06 RX ORDER — ALENDRONATE SODIUM 70 MG/1
TABLET ORAL
Qty: 8 TABLET | Refills: 0 | Status: SHIPPED | OUTPATIENT
Start: 2024-08-06

## 2024-08-17 DIAGNOSIS — I10 ESSENTIAL HYPERTENSION: ICD-10-CM

## 2024-08-19 RX ORDER — ATENOLOL AND CHLORTHALIDONE TABLET 50; 25 MG/1; MG/1
TABLET ORAL
Qty: 90 TABLET | Refills: 1 | Status: SHIPPED | OUTPATIENT
Start: 2024-08-19

## 2024-08-19 RX ORDER — INSULIN GLARGINE 100 [IU]/ML
INJECTION, SOLUTION SUBCUTANEOUS
Qty: 18 ML | Refills: 3 | Status: SHIPPED | OUTPATIENT
Start: 2024-08-19

## 2024-08-19 RX ORDER — LISINOPRIL 40 MG/1
40 TABLET ORAL DAILY
Qty: 90 TABLET | Refills: 1 | Status: SHIPPED | OUTPATIENT
Start: 2024-08-19

## 2024-09-13 RX ORDER — INSULIN ASPART 100 [IU]/ML
INJECTION, SOLUTION INTRAVENOUS; SUBCUTANEOUS
Qty: 12 ML | Refills: 2 | Status: SHIPPED | OUTPATIENT
Start: 2024-09-13

## 2024-09-19 DIAGNOSIS — E03.9 ACQUIRED HYPOTHYROIDISM: ICD-10-CM

## 2024-09-19 RX ORDER — LEVOTHYROXINE SODIUM 112 UG/1
112 TABLET ORAL DAILY
Qty: 90 TABLET | Refills: 0 | Status: SHIPPED | OUTPATIENT
Start: 2024-09-19

## 2024-09-20 ENCOUNTER — TRANSCRIBE ORDERS (OUTPATIENT)
Dept: ADMINISTRATIVE | Facility: HOSPITAL | Age: 74
End: 2024-09-20
Payer: MEDICARE

## 2024-09-20 DIAGNOSIS — Z12.31 ENCOUNTER FOR SCREENING MAMMOGRAM FOR BREAST CANCER: Primary | ICD-10-CM

## 2024-10-04 DIAGNOSIS — Z79.899 HIGH RISK MEDICATION USE: ICD-10-CM

## 2024-10-04 DIAGNOSIS — M54.17 LUMBOSACRAL RADICULOPATHY: ICD-10-CM

## 2024-10-04 RX ORDER — PREGABALIN 75 MG/1
75 CAPSULE ORAL 3 TIMES DAILY
Qty: 90 CAPSULE | Refills: 1 | Status: SHIPPED | OUTPATIENT
Start: 2024-10-04

## 2024-10-07 NOTE — TELEPHONE ENCOUNTER
Prior auth for PREGABALIN Capsule  DENIED :  We denied this request under Medicare Part D because: The information provided by your prescriber did not  meet the requirements for covering this medication (prior authorization).  Your plan does not allow coverage of this medication based on your prescriber answering No to the following  question(s):  Is the requested drug being prescribed as adjunctive therapy for treatment of partial onset seizures (focalonset  seizures)?  Is the requested drug being prescribed for the management of fibromyalgia or the management of neuropathic  pain associated with spinal cord injury?  Is the requested drug being prescribed for any of the following: A) Management of postherpetic neuralgia, B)  Management of neuropathic pain associated with diabetic peripheral neuropathy, C) Cancer-related  neuropathic pain, D) Cancer treatment-related neuropathic pain?

## 2024-10-08 ENCOUNTER — OFFICE VISIT (OUTPATIENT)
Dept: FAMILY MEDICINE CLINIC | Age: 74
End: 2024-10-08
Payer: MEDICARE

## 2024-10-08 ENCOUNTER — HOSPITAL ENCOUNTER (OUTPATIENT)
Dept: GENERAL RADIOLOGY | Facility: HOSPITAL | Age: 74
Discharge: HOME | End: 2024-10-08
Payer: MEDICARE

## 2024-10-08 ENCOUNTER — LAB (OUTPATIENT)
Dept: LAB | Facility: HOSPITAL | Age: 74
End: 2024-10-08
Payer: MEDICARE

## 2024-10-08 VITALS
TEMPERATURE: 97.5 F | SYSTOLIC BLOOD PRESSURE: 118 MMHG | DIASTOLIC BLOOD PRESSURE: 49 MMHG | BODY MASS INDEX: 32.68 KG/M2 | HEART RATE: 70 BPM | HEIGHT: 62 IN | WEIGHT: 177.6 LBS

## 2024-10-08 DIAGNOSIS — N18.31 TYPE 2 DIABETES MELLITUS WITH STAGE 3A CHRONIC KIDNEY DISEASE, WITH LONG-TERM CURRENT USE OF INSULIN: ICD-10-CM

## 2024-10-08 DIAGNOSIS — E78.2 MIXED HYPERLIPIDEMIA: ICD-10-CM

## 2024-10-08 DIAGNOSIS — Z79.4 TYPE 2 DIABETES MELLITUS WITH STAGE 3A CHRONIC KIDNEY DISEASE, WITH LONG-TERM CURRENT USE OF INSULIN: ICD-10-CM

## 2024-10-08 DIAGNOSIS — M79.641 PAIN OF RIGHT HAND: Primary | ICD-10-CM

## 2024-10-08 DIAGNOSIS — I10 HYPERTENSION, ESSENTIAL: ICD-10-CM

## 2024-10-08 DIAGNOSIS — Z23 ENCOUNTER FOR IMMUNIZATION: ICD-10-CM

## 2024-10-08 DIAGNOSIS — E11.22 TYPE 2 DIABETES MELLITUS WITH STAGE 3A CHRONIC KIDNEY DISEASE, WITH LONG-TERM CURRENT USE OF INSULIN: ICD-10-CM

## 2024-10-08 LAB
ALBUMIN SERPL-MCNC: 4.5 G/DL (ref 3.5–5.2)
ALBUMIN/GLOB SERPL: 1.6 G/DL
ALP SERPL-CCNC: 95 U/L (ref 39–117)
ALT SERPL W P-5'-P-CCNC: 29 U/L (ref 1–33)
ANION GAP SERPL CALCULATED.3IONS-SCNC: 12 MMOL/L (ref 5–15)
AST SERPL-CCNC: 25 U/L (ref 1–32)
BASOPHILS # BLD AUTO: 0.05 10*3/MM3 (ref 0–0.2)
BASOPHILS NFR BLD AUTO: 0.7 % (ref 0–1.5)
BILIRUB SERPL-MCNC: 0.7 MG/DL (ref 0–1.2)
BUN SERPL-MCNC: 21 MG/DL (ref 8–23)
BUN/CREAT SERPL: 19.6 (ref 7–25)
CALCIUM SPEC-SCNC: 10.1 MG/DL (ref 8.6–10.5)
CHLORIDE SERPL-SCNC: 99 MMOL/L (ref 98–107)
CHOLEST SERPL-MCNC: 146 MG/DL (ref 0–200)
CHROMATIN AB SERPL-ACNC: <10 IU/ML (ref 0–14)
CO2 SERPL-SCNC: 27 MMOL/L (ref 22–29)
CREAT SERPL-MCNC: 1.07 MG/DL (ref 0.57–1)
CRP SERPL-MCNC: <0.3 MG/DL (ref 0–0.5)
DEPRECATED RDW RBC AUTO: 45.8 FL (ref 37–54)
EGFRCR SERPLBLD CKD-EPI 2021: 55 ML/MIN/1.73
EOSINOPHIL # BLD AUTO: 0.17 10*3/MM3 (ref 0–0.4)
EOSINOPHIL NFR BLD AUTO: 2.5 % (ref 0.3–6.2)
ERYTHROCYTE [DISTWIDTH] IN BLOOD BY AUTOMATED COUNT: 13.8 % (ref 12.3–15.4)
ERYTHROCYTE [SEDIMENTATION RATE] IN BLOOD: 5 MM/HR (ref 0–30)
GLOBULIN UR ELPH-MCNC: 2.9 GM/DL
GLUCOSE SERPL-MCNC: 159 MG/DL (ref 65–99)
HBA1C MFR BLD: 7.7 % (ref 4.8–5.6)
HCT VFR BLD AUTO: 42.9 % (ref 34–46.6)
HDLC SERPL-MCNC: 38 MG/DL (ref 40–60)
HGB BLD-MCNC: 14 G/DL (ref 12–15.9)
IMM GRANULOCYTES # BLD AUTO: 0.01 10*3/MM3 (ref 0–0.05)
IMM GRANULOCYTES NFR BLD AUTO: 0.1 % (ref 0–0.5)
LDLC SERPL CALC-MCNC: 82 MG/DL (ref 0–100)
LDLC/HDLC SERPL: 2.06 {RATIO}
LYMPHOCYTES # BLD AUTO: 1.36 10*3/MM3 (ref 0.7–3.1)
LYMPHOCYTES NFR BLD AUTO: 20.1 % (ref 19.6–45.3)
MCH RBC QN AUTO: 29.3 PG (ref 26.6–33)
MCHC RBC AUTO-ENTMCNC: 32.6 G/DL (ref 31.5–35.7)
MCV RBC AUTO: 89.7 FL (ref 79–97)
MONOCYTES # BLD AUTO: 0.61 10*3/MM3 (ref 0.1–0.9)
MONOCYTES NFR BLD AUTO: 9 % (ref 5–12)
NEUTROPHILS NFR BLD AUTO: 4.56 10*3/MM3 (ref 1.7–7)
NEUTROPHILS NFR BLD AUTO: 67.6 % (ref 42.7–76)
PLATELET # BLD AUTO: 221 10*3/MM3 (ref 140–450)
PMV BLD AUTO: 11.2 FL (ref 6–12)
POTASSIUM SERPL-SCNC: 4.4 MMOL/L (ref 3.5–5.2)
PROT SERPL-MCNC: 7.4 G/DL (ref 6–8.5)
RBC # BLD AUTO: 4.78 10*6/MM3 (ref 3.77–5.28)
SODIUM SERPL-SCNC: 138 MMOL/L (ref 136–145)
TRIGL SERPL-MCNC: 149 MG/DL (ref 0–150)
VLDLC SERPL-MCNC: 26 MG/DL (ref 5–40)
WBC NRBC COR # BLD AUTO: 6.76 10*3/MM3 (ref 3.4–10.8)

## 2024-10-08 PROCEDURE — 3051F HG A1C>EQUAL 7.0%<8.0%: CPT | Performed by: FAMILY MEDICINE

## 2024-10-08 PROCEDURE — 99214 OFFICE O/P EST MOD 30 MIN: CPT | Performed by: FAMILY MEDICINE

## 2024-10-08 PROCEDURE — 86140 C-REACTIVE PROTEIN: CPT | Performed by: FAMILY MEDICINE

## 2024-10-08 PROCEDURE — 86200 CCP ANTIBODY: CPT | Performed by: FAMILY MEDICINE

## 2024-10-08 PROCEDURE — 3078F DIAST BP <80 MM HG: CPT | Performed by: FAMILY MEDICINE

## 2024-10-08 PROCEDURE — 85652 RBC SED RATE AUTOMATED: CPT | Performed by: FAMILY MEDICINE

## 2024-10-08 PROCEDURE — 73130 X-RAY EXAM OF HAND: CPT

## 2024-10-08 PROCEDURE — 83036 HEMOGLOBIN GLYCOSYLATED A1C: CPT | Performed by: FAMILY MEDICINE

## 2024-10-08 PROCEDURE — 1126F AMNT PAIN NOTED NONE PRSNT: CPT | Performed by: FAMILY MEDICINE

## 2024-10-08 PROCEDURE — 3074F SYST BP LT 130 MM HG: CPT | Performed by: FAMILY MEDICINE

## 2024-10-08 PROCEDURE — 90662 IIV NO PRSV INCREASED AG IM: CPT | Performed by: FAMILY MEDICINE

## 2024-10-08 PROCEDURE — 91320 SARSCV2 VAC 30MCG TRS-SUC IM: CPT | Performed by: FAMILY MEDICINE

## 2024-10-08 PROCEDURE — G0008 ADMIN INFLUENZA VIRUS VAC: HCPCS | Performed by: FAMILY MEDICINE

## 2024-10-08 PROCEDURE — 90480 ADMN SARSCOV2 VAC 1/ONLY CMP: CPT | Performed by: FAMILY MEDICINE

## 2024-10-08 PROCEDURE — 80061 LIPID PANEL: CPT | Performed by: FAMILY MEDICINE

## 2024-10-08 PROCEDURE — 36415 COLL VENOUS BLD VENIPUNCTURE: CPT | Performed by: FAMILY MEDICINE

## 2024-10-08 PROCEDURE — 86431 RHEUMATOID FACTOR QUANT: CPT | Performed by: FAMILY MEDICINE

## 2024-10-08 PROCEDURE — 80053 COMPREHEN METABOLIC PANEL: CPT | Performed by: FAMILY MEDICINE

## 2024-10-08 PROCEDURE — 85025 COMPLETE CBC W/AUTO DIFF WBC: CPT | Performed by: FAMILY MEDICINE

## 2024-10-08 NOTE — PROGRESS NOTES
Chief Complaint  Hand Pain (Right hand swelling)    Subjective          Kelsy Rider presents to [facility]  History of Present Illness      Past 3 weeks been experiencing some right hand pain.  Has stiffness and discomfort from the moment she gets up throughout the entire day.  Trouble gripping any hand or anything with the right hand.      In the past week, has had a trigger finger type issue with the right ring finger and the left index finger.    She has taken some Advil without much relief.    Current Outpatient Medications on File Prior to Visit   Medication Sig Dispense Refill    alendronate (FOSAMAX) 70 MG tablet TAKE 1 TABLET BY MOUTH ONCE WEEKLY ON AN EMPTY STOMACH BEFORE BREAKFAST. REMAIN UPRIGHT FOR 30 MINUTES AND TAKE WITH 8 OUNCES OF WATER 8 tablet 0    aspirin 81 MG chewable tablet Chew 1 tablet Daily.      atenolol-chlorthalidone (TENORETIC) 50-25 MG per tablet TAKE 1/2 TABLET BY MOUTH TWICE A DAY 90 tablet 1    atorvastatin (LIPITOR) 80 MG tablet Take 0.5 tablets by mouth Daily. 1 tablet 0    BD Pen Needle Ita U/F 32G X 4 MM misc USE AS DIRECTED WITH VICTOZA, LANTUS AND NOVOLOG 200 each 3    Calcium Carbonate-Vitamin D 600-200 MG-UNIT capsule Calcium 600 + D(3) 600 mg calcium- 200 unit oral capsule take 1 capsule by oral route daily   Active      coenzyme Q10 100 MG capsule Co Q-10 100 mg oral capsule take 1 capsule by oral route daily   Active      ezetimibe (ZETIA) 10 MG tablet Take 1 tablet by mouth Daily.      famotidine (PEPCID) 20 MG tablet Take 1 tablet by mouth Daily.      glucose blood (Accu-Chek Gricelda Plus) test strip USE TO TEST BLOOD SUGAR THREE TIMES A DAY DX E11.22 300 each 1    insulin aspart (NovoLOG FlexPen) 100 UNIT/ML solution pen-injector sc pen INJECT 4 UNITS UNDER THE SKIN EVERY MORNING, THEN 14 UNITS WITH NOON MEAL, THEN 6 UNITS WITH EVENING MEAL ALONG WITH SLIDING SCALE MAX 40 UNITS DAILY 12 mL 2    Lantus SoloStar 100 UNIT/ML injection  "pen INJECT 66 UNITS UNDER THE SKIN DAILY 18 mL 3    levothyroxine (SYNTHROID, LEVOTHROID) 112 MCG tablet TAKE 1 TABLET BY MOUTH DAILY 90 tablet 0    lisinopril (PRINIVIL,ZESTRIL) 40 MG tablet TAKE 1 TABLET BY MOUTH DAILY 90 tablet 1    metFORMIN (GLUCOPHAGE) 850 MG tablet TAKE 1 TABLET BY MOUTH DAILY 90 tablet 1    pregabalin (LYRICA) 75 MG capsule TAKE 1 CAPSULE BY MOUTH 3 TIMES A DAY 90 capsule 1    cyclobenzaprine (FLEXERIL) 10 MG tablet Take Half or One tablet TID prn for muscle spasm.  May cause drowsiness (Patient not taking: Reported on 10/8/2024) 30 tablet 0     No current facility-administered medications on file prior to visit.       Review of Systems         Objective   Vital Signs:   /49 (BP Location: Right arm, Patient Position: Sitting)   Pulse 70   Temp 97.5 °F (36.4 °C) (Temporal)   Ht 157.5 cm (62.01\")   Wt 80.6 kg (177 lb 9.6 oz)   BMI 32.47 kg/m²     Physical Exam     No acute distress  Obese  Heart regular rate rhythm no murmur  Lungs clear  Hands without acute inflammation or deformities        Result Review :                     Assessment and Plan    Diagnoses and all orders for this visit:    1. Pain of right hand (Primary)  Assessment & Plan:  Will get x-rays and labs to evaluate for etiology.  For now we will have her take Tylenol 500 mg 4 times daily.  Told her we could refer to hand surgeon for the trigger finger issues but she wants to hold off on that for now.    Orders:  -     XR Hand 3+ View Right  -     XR Hand 3+ View Left  -     Sedimentation Rate  -     C-reactive Protein  -     Rheumatoid Factor  -     Cyclic Citrul Peptide Antibody, IgG / IgA    2. Encounter for immunization  -     Fluzone High-Dose 65+yrs  -     COVID-19 (Pfizer) 12yrs+ (COMIRNATY)    3. Type 2 diabetes mellitus with stage 3a chronic kidney disease, with long-term current use of insulin  Assessment & Plan:  Will get labs for follow-up since we are drawing blood today.      Orders:  -     Hemoglobin " A1c    4. Hypertension, essential  Assessment & Plan:  Blood pressure actually looks pretty good today continue on current regimen     Orders:  -     Comprehensive Metabolic Panel  -     CBC Auto Differential    5. Mixed hyperlipidemia  Assessment & Plan:  Since we are going to be drawing blood I will go ahead and check lipid profile     Orders:  -     Lipid Panel  -     Comprehensive Metabolic Panel        Follow Up   No follow-ups on file.  Patient was given instructions and counseling regarding her condition or for health maintenance advice. Please see specific information pulled into the AVS if appropriate.

## 2024-10-08 NOTE — ASSESSMENT & PLAN NOTE
Will get x-rays and labs to evaluate for etiology.  For now we will have her take Tylenol 500 mg 4 times daily.  Told her we could refer to hand surgeon for the trigger finger issues but she wants to hold off on that for now.

## 2024-10-10 LAB — CCP IGA+IGG SERPL IA-ACNC: 31 UNITS (ref 0–19)

## 2024-10-15 ENCOUNTER — OFFICE VISIT (OUTPATIENT)
Dept: FAMILY MEDICINE CLINIC | Age: 74
End: 2024-10-15
Payer: MEDICARE

## 2024-10-15 VITALS
HEIGHT: 62 IN | SYSTOLIC BLOOD PRESSURE: 124 MMHG | HEART RATE: 60 BPM | BODY MASS INDEX: 33.13 KG/M2 | WEIGHT: 180 LBS | TEMPERATURE: 97.2 F | OXYGEN SATURATION: 97 % | DIASTOLIC BLOOD PRESSURE: 63 MMHG

## 2024-10-15 DIAGNOSIS — Z79.4 TYPE 2 DIABETES MELLITUS WITH STAGE 3A CHRONIC KIDNEY DISEASE, WITH LONG-TERM CURRENT USE OF INSULIN: ICD-10-CM

## 2024-10-15 DIAGNOSIS — M81.0 OSTEOPOROSIS, POST-MENOPAUSAL: Primary | ICD-10-CM

## 2024-10-15 DIAGNOSIS — Z78.0 POSTMENOPAUSAL STATE: ICD-10-CM

## 2024-10-15 DIAGNOSIS — E11.22 TYPE 2 DIABETES MELLITUS WITH STAGE 3A CHRONIC KIDNEY DISEASE, WITH LONG-TERM CURRENT USE OF INSULIN: ICD-10-CM

## 2024-10-15 DIAGNOSIS — N18.31 TYPE 2 DIABETES MELLITUS WITH STAGE 3A CHRONIC KIDNEY DISEASE, WITH LONG-TERM CURRENT USE OF INSULIN: ICD-10-CM

## 2024-10-15 DIAGNOSIS — R20.2 PARESTHESIAS IN RIGHT HAND: ICD-10-CM

## 2024-10-15 DIAGNOSIS — M79.641 PAIN OF RIGHT HAND: Primary | ICD-10-CM

## 2024-10-15 PROCEDURE — 1126F AMNT PAIN NOTED NONE PRSNT: CPT | Performed by: FAMILY MEDICINE

## 2024-10-15 PROCEDURE — 3051F HG A1C>EQUAL 7.0%<8.0%: CPT | Performed by: FAMILY MEDICINE

## 2024-10-15 PROCEDURE — 3078F DIAST BP <80 MM HG: CPT | Performed by: FAMILY MEDICINE

## 2024-10-15 PROCEDURE — G2211 COMPLEX E/M VISIT ADD ON: HCPCS | Performed by: FAMILY MEDICINE

## 2024-10-15 PROCEDURE — 3074F SYST BP LT 130 MM HG: CPT | Performed by: FAMILY MEDICINE

## 2024-10-15 PROCEDURE — 99213 OFFICE O/P EST LOW 20 MIN: CPT | Performed by: FAMILY MEDICINE

## 2024-10-15 RX ORDER — ALENDRONATE SODIUM 70 MG/1
TABLET ORAL
Qty: 12 TABLET | Refills: 1 | Status: SHIPPED | OUTPATIENT
Start: 2024-10-15

## 2024-10-15 NOTE — ASSESSMENT & PLAN NOTE
Reviewed the x-ray results with her.  She does not recall previous injuries.  I think her current pain sounds more likely to be neurogenic.  Will set her up for nerve conduction study and EMG

## 2024-10-15 NOTE — PROGRESS NOTES
Chief Complaint  Pain of right hand (Follow up results, pain not better from last visit with you on 10/8/2024 )    Subjective          Kelsy Rider presents to [facility]  History of Present Illness    Here to review recent lab results with her hemoglobin A1c at 7.7, which were the highest reading she has had over the past couple years.  She admits that she has not been following best diet.  Was in [place] last week off her diet.  On returning home doing better, blood sugar this morning was 87.  Her highest blood sugar since returning was 180.  She has used Victoza in the past but had to discontinue due to cost.  Talked about how Medicare is changing at the beginning of the year and there could be medication cost savings as a result.    Also following up on her right hand pain.  It has been bothering her primarily on the ulnar aspect.  X-ray had shown an old avulsion fracture on the radial aspect.  The pain she is describing now seems more neurologic in origin as it involves the fourth and fifth digit. Significant enough to make it difficult for her to write, or to hold a fork.      Right ring finger and left index trigger have continued to have some triggering at times.  Reminded her that we could refer to hand surgeon to address that issue      Current Outpatient Medications on File Prior to Visit   Medication Sig Dispense Refill    alendronate (FOSAMAX) 70 MG tablet TAKE 1 TABLET BY MOUTH ONCE WEEKLY ON AN EMPTY STOMACH BEFORE BREAKFAST. REMAIN UPRIGHT FOR 30 MINUTES AND TAKE WITH 8 OUNCES OF WATER 12 tablet 1    aspirin 81 MG chewable tablet Chew 1 tablet Daily.      atenolol-chlorthalidone (TENORETIC) 50-25 MG per tablet TAKE 1/2 TABLET BY MOUTH TWICE A DAY 90 tablet 1    atorvastatin (LIPITOR) 80 MG tablet Take 0.5 tablets by mouth Daily. 1 tablet 0    BD Pen Needle Ita U/F 32G X 4 MM misc USE AS DIRECTED WITH VICTOZA, LANTUS AND NOVOLOG 200 each 3    Calcium  "Carbonate-Vitamin D 600-200 MG-UNIT capsule Calcium 600 + D(3) 600 mg calcium- 200 unit oral capsule take 1 capsule by oral route daily   Active      coenzyme Q10 100 MG capsule Co Q-10 100 mg oral capsule take 1 capsule by oral route daily   Active      famotidine (PEPCID) 20 MG tablet Take 1 tablet by mouth Daily.      glucose blood (Accu-Chek Gricelda Plus) test strip USE TO TEST BLOOD SUGAR THREE TIMES A DAY DX E11.22 300 each 1    insulin aspart (NovoLOG FlexPen) 100 UNIT/ML solution pen-injector sc pen INJECT 4 UNITS UNDER THE SKIN EVERY MORNING, THEN 14 UNITS WITH NOON MEAL, THEN 6 UNITS WITH EVENING MEAL ALONG WITH SLIDING SCALE MAX 40 UNITS DAILY 12 mL 2    Lantus SoloStar 100 UNIT/ML injection pen INJECT 66 UNITS UNDER THE SKIN DAILY (Patient taking differently: Inject 60 Units under the skin into the appropriate area as directed Daily.) 18 mL 3    levothyroxine (SYNTHROID, LEVOTHROID) 112 MCG tablet TAKE 1 TABLET BY MOUTH DAILY 90 tablet 0    lisinopril (PRINIVIL,ZESTRIL) 40 MG tablet TAKE 1 TABLET BY MOUTH DAILY 90 tablet 1    metFORMIN (GLUCOPHAGE) 850 MG tablet TAKE 1 TABLET BY MOUTH DAILY 90 tablet 1    pregabalin (LYRICA) 75 MG capsule TAKE 1 CAPSULE BY MOUTH 3 TIMES A DAY 90 capsule 1    ezetimibe (ZETIA) 10 MG tablet Take 1 tablet by mouth Daily.      [DISCONTINUED] alendronate (FOSAMAX) 70 MG tablet TAKE 1 TABLET BY MOUTH ONCE WEEKLY ON AN EMPTY STOMACH BEFORE BREAKFAST. REMAIN UPRIGHT FOR 30 MINUTES AND TAKE WITH 8 OUNCES OF WATER 8 tablet 0     No current facility-administered medications on file prior to visit.       Review of Systems         Objective   Vital Signs:   /63 (BP Location: Left arm, Patient Position: Sitting, Cuff Size: Adult)   Pulse 60   Temp 97.2 °F (36.2 °C) (Oral)   Ht 157.5 cm (62\")   Wt 81.6 kg (180 lb)   SpO2 97%   BMI 32.92 kg/m²     Physical Exam     No acute distress  No acute inflammatory joints or deformity in the location of her discomfort of the right " hand.  Nontender to palpation around the elbow  Negative Phalen's and Tinel's  Range of motion of the neck is good without reproduction of symptoms        Result Review :                     Assessment and Plan    Diagnoses and all orders for this visit:    1. Pain of right hand (Primary)  Assessment & Plan:  Reviewed the x-ray results with her.  She does not recall previous injuries.  I think her current pain sounds more likely to be neurogenic.  Will set her up for nerve conduction study and EMG    Orders:  -     EMG & Nerve Conduction Test; Future    2. Paresthesias in right hand  Comments:  As above  Orders:  -     EMG & Nerve Conduction Test; Future    3. Type 2 diabetes mellitus with stage 3a chronic kidney disease, with long-term current use of insulin  Assessment & Plan:  For now she will titrate the Lantus 1 unit every 3 days that her average morning blood sugars above 150.  After the new year will revisit the possibility of GLP-1 agonist.            Follow Up   No follow-ups on file.  Patient was given instructions and counseling regarding her condition or for health maintenance advice. Please see specific information pulled into the AVS if appropriate.

## 2024-10-15 NOTE — TELEPHONE ENCOUNTER
Refill sent.  These let her know she is due for a bone density study so that order is placed as well    [facility]  Anatoly Zuñiga MD

## 2024-10-15 NOTE — ASSESSMENT & PLAN NOTE
For now she will titrate the Lantus 1 unit every 3 days that her average morning blood sugars above 150.  After the new year will revisit the possibility of GLP-1 agonist.

## 2024-11-01 ENCOUNTER — HOSPITAL ENCOUNTER (OUTPATIENT)
Dept: BONE DENSITY | Facility: HOSPITAL | Age: 74
Discharge: HOME | End: 2024-11-01
Payer: MEDICARE

## 2024-11-01 DIAGNOSIS — Z78.0 POSTMENOPAUSAL STATE: ICD-10-CM

## 2024-11-01 DIAGNOSIS — M81.0 OSTEOPOROSIS, POST-MENOPAUSAL: ICD-10-CM

## 2024-11-01 PROCEDURE — 77080 DXA BONE DENSITY AXIAL: CPT

## 2024-11-08 ENCOUNTER — HOSPITAL ENCOUNTER (OUTPATIENT)
Dept: MAMMOGRAPHY | Facility: HOSPITAL | Age: 74
Discharge: HOME | End: 2024-11-08
Admitting: FAMILY MEDICINE
Payer: MEDICARE

## 2024-11-08 DIAGNOSIS — Z12.31 ENCOUNTER FOR SCREENING MAMMOGRAM FOR BREAST CANCER: ICD-10-CM

## 2024-11-08 PROCEDURE — 77067 SCR MAMMO BI INCL CAD: CPT

## 2024-11-08 PROCEDURE — 77063 BREAST TOMOSYNTHESIS BI: CPT

## 2024-11-12 DIAGNOSIS — E11.22 TYPE 2 DIABETES MELLITUS WITH STAGE 3A CHRONIC KIDNEY DISEASE, WITH LONG-TERM CURRENT USE OF INSULIN: ICD-10-CM

## 2024-11-12 DIAGNOSIS — Z79.4 TYPE 2 DIABETES MELLITUS WITH STAGE 3A CHRONIC KIDNEY DISEASE, WITH LONG-TERM CURRENT USE OF INSULIN: ICD-10-CM

## 2024-11-12 DIAGNOSIS — N18.31 TYPE 2 DIABETES MELLITUS WITH STAGE 3A CHRONIC KIDNEY DISEASE, WITH LONG-TERM CURRENT USE OF INSULIN: ICD-10-CM

## 2024-11-21 ENCOUNTER — HOSPITAL ENCOUNTER (OUTPATIENT)
Facility: HOSPITAL | Age: 74
Discharge: HOME | End: 2024-11-21
Admitting: FAMILY MEDICINE
Payer: MEDICARE

## 2024-11-21 DIAGNOSIS — M79.641 PAIN OF RIGHT HAND: ICD-10-CM

## 2024-11-21 DIAGNOSIS — R20.2 PARESTHESIAS IN RIGHT HAND: ICD-10-CM

## 2024-11-21 PROCEDURE — 95886 MUSC TEST DONE W/N TEST COMP: CPT

## 2024-11-21 PROCEDURE — 95909 NRV CNDJ TST 5-6 STUDIES: CPT

## 2024-11-25 ENCOUNTER — OFFICE VISIT (OUTPATIENT)
Dept: FAMILY MEDICINE CLINIC | Age: 74
End: 2024-11-25
Payer: MEDICARE

## 2024-11-25 ENCOUNTER — TELEPHONE (OUTPATIENT)
Dept: FAMILY MEDICINE CLINIC | Age: 74
End: 2024-11-25

## 2024-11-25 VITALS
SYSTOLIC BLOOD PRESSURE: 137 MMHG | WEIGHT: 181.2 LBS | DIASTOLIC BLOOD PRESSURE: 70 MMHG | HEIGHT: 62 IN | BODY MASS INDEX: 33.34 KG/M2 | TEMPERATURE: 97.9 F | HEART RATE: 74 BPM

## 2024-11-25 DIAGNOSIS — H81.12 BENIGN PAROXYSMAL POSITIONAL VERTIGO OF LEFT EAR: ICD-10-CM

## 2024-11-25 DIAGNOSIS — R42 DIZZINESS: Primary | ICD-10-CM

## 2024-11-25 DIAGNOSIS — I10 HYPERTENSION, ESSENTIAL: ICD-10-CM

## 2024-11-25 RX ORDER — MECLIZINE HYDROCHLORIDE 25 MG/1
25 TABLET ORAL 3 TIMES DAILY PRN
Qty: 30 TABLET | Refills: 0 | Status: SHIPPED | OUTPATIENT
Start: 2024-11-25

## 2024-11-25 NOTE — TELEPHONE ENCOUNTER
Caller: Kelsy Rider     Relationship: [unfilled]     Best call back number: [phone]    What is your medical concern? UNSURE, DIFFICULTY STANDING, NAUSEA    How long has this issue been going on? EARLY FRIDAY MORNING     Is your provider already aware of this issue? NO  PATIENT IS HAVING UNUSUAL EPISODES WHEN TRYING TO GET OUT OF BED, SHE FELL TO HER KNEES THE FIRST TIME WHEN GETTING OUT OF BED TO USE THE RESTROOM. SHE IS NOW SITTING ON THE EDGE OF THE BED BEFORE STANDING AND LETTING THE SENSATION PASS BEFORE ATTEMPTING TO STAND   SHE THINKS IT COULD BE CONNECTED TO THE TEST SHE HAD DONE LAST  THURSDAY  SHE WOULD LIKE JUAN TO CALL HER AND DISCUSS THIS ISSUE

## 2024-11-25 NOTE — PROGRESS NOTES
Chief Complaint  Dizziness    Subjective          Kelsy Rider presents to [facility]  History of Present Illness      Had nerve conduction study/EMG on Thursday.  Early morning hours on Friday they (during the middle the night) got up went to the bathroom and as she was turning off of the toilet she got dizzy and went to her knees.  Laid there for a minute and symptoms resolved.    Sunday morning and this morning she sat up on the side of the bed and then had a sudden sense of imbalance and falls back onto the bed (sideways).    Has never had a diagnosis of vertigo.        Current Outpatient Medications on File Prior to Visit   Medication Sig Dispense Refill    alendronate (FOSAMAX) 70 MG tablet TAKE 1 TABLET BY MOUTH ONCE WEEKLY ON AN EMPTY STOMACH BEFORE BREAKFAST. REMAIN UPRIGHT FOR 30 MINUTES AND TAKE WITH 8 OUNCES OF WATER 12 tablet 1    aspirin 81 MG chewable tablet Chew 1 tablet Daily.      atenolol-chlorthalidone (TENORETIC) 50-25 MG per tablet TAKE 1/2 TABLET BY MOUTH TWICE A DAY 90 tablet 1    atorvastatin (LIPITOR) 80 MG tablet Take 0.5 tablets by mouth Daily. 1 tablet 0    BD Pen Needle Ita U/F 32G X 4 MM misc USE AS DIRECTED WITH VICTOZA, LANTUS AND NOVOLOG 200 each 3    Calcium Carbonate-Vitamin D 600-200 MG-UNIT capsule Calcium 600 + D(3) 600 mg calcium- 200 unit oral capsule take 1 capsule by oral route daily   Active      coenzyme Q10 100 MG capsule Co Q-10 100 mg oral capsule take 1 capsule by oral route daily   Active      ezetimibe (ZETIA) 10 MG tablet Take 1 tablet by mouth Daily.      famotidine (PEPCID) 20 MG tablet Take 1 tablet by mouth Daily.      glucose blood (Accu-Chek Gricelda Plus) test strip USE TO TEST BLOOD SUGAR THREE TIMES A DAY DX E11.22 300 each 1    insulin aspart (NovoLOG FlexPen) 100 UNIT/ML solution pen-injector sc pen INJECT 4 UNITS UNDER THE SKIN EVERY MORNING, THEN 14 UNITS WITH NOON MEAL, THEN 6 UNITS WITH EVENING MEAL ALONG WITH  "SLIDING SCALE MAX 40 UNITS DAILY 12 mL 2    Lantus SoloStar 100 UNIT/ML injection pen INJECT 66 UNITS UNDER THE SKIN DAILY (Patient taking differently: Inject 60 Units under the skin into the appropriate area as directed Daily.) 18 mL 3    levothyroxine (SYNTHROID, LEVOTHROID) 112 MCG tablet TAKE 1 TABLET BY MOUTH DAILY 90 tablet 0    lisinopril (PRINIVIL,ZESTRIL) 40 MG tablet TAKE 1 TABLET BY MOUTH DAILY 90 tablet 1    metFORMIN (GLUCOPHAGE) 850 MG tablet TAKE 1 TABLET BY MOUTH DAILY 90 tablet 1    pregabalin (LYRICA) 75 MG capsule TAKE 1 CAPSULE BY MOUTH 3 TIMES A DAY 90 capsule 1     No current facility-administered medications on file prior to visit.       Review of Systems         Objective   Vital Signs:   /70 (BP Location: Right arm, Patient Position: Sitting)   Pulse 74   Temp 97.9 °F (36.6 °C) (Oral)   Ht 157.5 cm (62\")   Wt 82.2 kg (181 lb 3.2 oz)   BMI 33.14 kg/m²     Physical Exam   No acute distress  Obese  Color is good  Eyes nonicteric  Tympanic membranes clear  Oropharynx clear  Heart regular rate and rhythm  Lungs clear  Neurologic without gross lateralizing focal deficit  Barany's test reproduce symptoms with left ear down        Result Review :                     Assessment and Plan    Diagnoses and all orders for this visit:    1. Dizziness (Primary)  Comments:  Consistent with benign positional vertigo    2. Benign paroxysmal positional vertigo of left ear  Assessment & Plan:  Handout given for modified Epley exercise.  Can use meclizine on a as needed basis.    Orders:  -     meclizine (ANTIVERT) 25 MG tablet; Take 1 tablet by mouth 3 (Three) Times a Day As Needed for Dizziness.  Dispense: 30 tablet; Refill: 0    3. Hypertension, essential  Assessment & Plan:  Blood pressure doing okay despite her dizziness.  Continue on current regimen           Follow Up   No follow-ups on file.  Patient was given instructions and counseling regarding her condition or for health maintenance " advice. Please see specific information pulled into the AVS if appropriate.

## 2024-12-01 PROBLEM — H81.12 BENIGN PAROXYSMAL POSITIONAL VERTIGO OF LEFT EAR: Status: ACTIVE | Noted: 2024-12-01

## 2024-12-10 RX ORDER — INSULIN GLARGINE 100 [IU]/ML
INJECTION, SOLUTION SUBCUTANEOUS
Qty: 18 ML | Refills: 0 | Status: SHIPPED | OUTPATIENT
Start: 2024-12-10

## 2024-12-13 DIAGNOSIS — M54.17 LUMBOSACRAL RADICULOPATHY: ICD-10-CM

## 2024-12-13 DIAGNOSIS — Z79.899 HIGH RISK MEDICATION USE: ICD-10-CM

## 2024-12-13 RX ORDER — PREGABALIN 75 MG/1
75 CAPSULE ORAL 3 TIMES DAILY
Qty: 90 CAPSULE | Refills: 0 | Status: SHIPPED | OUTPATIENT
Start: 2024-12-13

## 2024-12-13 RX ORDER — INSULIN ASPART 100 [IU]/ML
INJECTION, SOLUTION INTRAVENOUS; SUBCUTANEOUS
Qty: 12 ML | Refills: 2 | Status: SHIPPED | OUTPATIENT
Start: 2024-12-13

## 2024-12-19 ENCOUNTER — TELEPHONE (OUTPATIENT)
Dept: FAMILY MEDICINE CLINIC | Age: 74
End: 2024-12-19

## 2024-12-19 ENCOUNTER — OFFICE VISIT (OUTPATIENT)
Dept: FAMILY MEDICINE CLINIC | Age: 74
End: 2024-12-19
Payer: MEDICARE

## 2024-12-19 VITALS
HEIGHT: 62 IN | BODY MASS INDEX: 33.34 KG/M2 | HEART RATE: 68 BPM | OXYGEN SATURATION: 95 % | TEMPERATURE: 98.1 F | WEIGHT: 181.2 LBS | DIASTOLIC BLOOD PRESSURE: 39 MMHG | SYSTOLIC BLOOD PRESSURE: 111 MMHG

## 2024-12-19 DIAGNOSIS — Z00.00 MEDICARE ANNUAL WELLNESS VISIT, SUBSEQUENT: Primary | ICD-10-CM

## 2024-12-19 DIAGNOSIS — E11.22 TYPE 2 DIABETES MELLITUS WITH STAGE 3A CHRONIC KIDNEY DISEASE, WITH LONG-TERM CURRENT USE OF INSULIN: ICD-10-CM

## 2024-12-19 DIAGNOSIS — Z79.4 TYPE 2 DIABETES MELLITUS WITH STAGE 3A CHRONIC KIDNEY DISEASE, WITH LONG-TERM CURRENT USE OF INSULIN: ICD-10-CM

## 2024-12-19 DIAGNOSIS — Z79.899 HIGH RISK MEDICATION USE: ICD-10-CM

## 2024-12-19 DIAGNOSIS — E66.09 CLASS 1 OBESITY DUE TO EXCESS CALORIES WITH SERIOUS COMORBIDITY AND BODY MASS INDEX (BMI) OF 33.0 TO 33.9 IN ADULT: ICD-10-CM

## 2024-12-19 DIAGNOSIS — N18.31 TYPE 2 DIABETES MELLITUS WITH STAGE 3A CHRONIC KIDNEY DISEASE, WITH LONG-TERM CURRENT USE OF INSULIN: ICD-10-CM

## 2024-12-19 DIAGNOSIS — E03.9 ACQUIRED HYPOTHYROIDISM: ICD-10-CM

## 2024-12-19 DIAGNOSIS — I10 HYPERTENSION, ESSENTIAL: ICD-10-CM

## 2024-12-19 DIAGNOSIS — E66.811 CLASS 1 OBESITY DUE TO EXCESS CALORIES WITH SERIOUS COMORBIDITY AND BODY MASS INDEX (BMI) OF 33.0 TO 33.9 IN ADULT: ICD-10-CM

## 2024-12-19 DIAGNOSIS — E78.2 MIXED HYPERLIPIDEMIA: ICD-10-CM

## 2024-12-19 LAB
AMPHET+METHAMPHET UR QL: NEGATIVE
AMPHETAMINES UR QL: NEGATIVE
BARBITURATES UR QL SCN: NEGATIVE
BENZODIAZ UR QL SCN: NEGATIVE
BUPRENORPHINE SERPL-MCNC: NEGATIVE NG/ML
CANNABINOIDS SERPL QL: NEGATIVE
COCAINE UR QL: NEGATIVE
EXPIRATION DATE: NORMAL
Lab: NORMAL
MDMA UR QL SCN: NEGATIVE
METHADONE UR QL SCN: NEGATIVE
MORPHINE/OPIATES SCREEN, URINE: NEGATIVE
OXYCODONE UR QL SCN: NEGATIVE
PCP UR QL SCN: NEGATIVE

## 2024-12-19 NOTE — ASSESSMENT & PLAN NOTE
Continue current medicine for now.  Check lab prior to next visit.  Orders:    TSH Rfx On Abnormal To Free T4; Future

## 2024-12-19 NOTE — ASSESSMENT & PLAN NOTE
Patient's (Body mass index is 33.14 kg/m².) indicates that they are  with health conditions that include  . Weight is . BMI  . We discussed .

## 2024-12-19 NOTE — PROGRESS NOTES
Subjective   The ABCs of the Annual Wellness Visit  Medicare Wellness Visit      Kelsy Rider is a 74 y.o. patient who presents for a Medicare Wellness Visit.    The following portions of the patient's history were reviewed and   updated as appropriate: allergies, current medications, past family history, past medical history, past social history, past surgical history, and problem list.    Compared to one year ago, the patient's physical   health is the same.  Compared to one year ago, the patient's mental   health is the same.    Recent Hospitalizations:  She was not admitted to the hospital during the last year.     Current Medical Providers:  Patient Care Team:  Jonnie Zuñiga MD as PCP - General (Family Medicine)  Mumtaz Mathis MD as Consulting Physician (Orthopedic Surgery)  Darin Edward MD as Consulting Physician (Cardiology)    Outpatient Medications Prior to Visit   Medication Sig Dispense Refill    alendronate (FOSAMAX) 70 MG tablet TAKE 1 TABLET BY MOUTH ONCE WEEKLY ON AN EMPTY STOMACH BEFORE BREAKFAST. REMAIN UPRIGHT FOR 30 MINUTES AND TAKE WITH 8 OUNCES OF WATER 12 tablet 1    aspirin 81 MG chewable tablet Chew 1 tablet Daily.      atenolol-chlorthalidone (TENORETIC) 50-25 MG per tablet TAKE 1/2 TABLET BY MOUTH TWICE A DAY 90 tablet 1    atorvastatin (LIPITOR) 80 MG tablet Take 0.5 tablets by mouth Daily. 1 tablet 0    BD Pen Needle Ita U/F 32G X 4 MM misc USE AS DIRECTED WITH VICTOZA, LANTUS AND NOVOLOG 200 each 3    Calcium Carbonate-Vitamin D 600-200 MG-UNIT capsule Calcium 600 + D(3) 600 mg calcium- 200 unit oral capsule take 1 capsule by oral route daily   Active      coenzyme Q10 100 MG capsule Co Q-10 100 mg oral capsule take 1 capsule by oral route daily   Active      famotidine (PEPCID) 20 MG tablet Take 1 tablet by mouth Daily.      glucose blood (Accu-Chek Gricelda Plus) test strip USE TO TEST BLOOD SUGAR THREE TIMES A DAY DX E11.22 300 each 1    Insulin Glargine (Lantus  SoloStar) 100 UNIT/ML injection pen INJECT 66 UNITS UNDER THE SKIN DAILY 18 mL 0    lisinopril (PRINIVIL,ZESTRIL) 40 MG tablet TAKE 1 TABLET BY MOUTH DAILY 90 tablet 1    meclizine (ANTIVERT) 25 MG tablet Take 1 tablet by mouth 3 (Three) Times a Day As Needed for Dizziness. 30 tablet 0    metFORMIN (GLUCOPHAGE) 850 MG tablet TAKE 1 TABLET BY MOUTH DAILY 90 tablet 1    NovoLOG FlexPen 100 UNIT/ML solution pen-injector sc pen INJECT 4 UNITS UNDER THE SKIN EVERY MORNING, THEN 14 UNITS WITH NOON MEAL, THEN 6 UNITS WITH EVENING MEAL ALONG WITH SLIDING SCALE MAX 40 UNITS DAILY 12 mL 2    pregabalin (LYRICA) 75 MG capsule TAKE 1 CAPSULE BY MOUTH 3 TIMES A DAY 90 capsule 0    levothyroxine (SYNTHROID, LEVOTHROID) 112 MCG tablet TAKE 1 TABLET BY MOUTH DAILY 90 tablet 0    ezetimibe (ZETIA) 10 MG tablet Take 1 tablet by mouth Daily.       No facility-administered medications prior to visit.     No opioid medication identified on active medication list. I have reviewed chart for other potential  high risk medication/s and harmful drug interactions in the elderly.      Aspirin is on active medication list. Aspirin use is indicated based on review of current medical condition/s. Pros and cons of this therapy have been discussed today. Benefits of this medication outweigh potential harm.  Patient has been encouraged to continue taking this medication.  .      Patient Active Problem List   Diagnosis    Other chronic pain    Spinal stenosis of lumbar region    DDD (degenerative disc disease), lumbar    Osteoporosis, post-menopausal    Neurogenic claudication due to lumbar spinal stenosis    Lumbosacral radiculopathy    Acquired hypothyroidism    Hypertension, essential    Mixed hyperlipidemia    Type 2 diabetes mellitus with stage 3a chronic kidney disease, with long-term current use of insulin    Right hip pain    High risk medication use    Immunization, pneumococcus and influenza    Class 1 obesity due to excess calories with  "serious comorbidity and body mass index (BMI) of 33.0 to 33.9 in adult    Sebaceous cyst    Medicare annual wellness visit, subsequent    COVID-19 virus infection    Overactive bladder    Acute bilateral low back pain without sciatica    Right upper quadrant abdominal tenderness without rebound tenderness    Right-sided chest wall pain    Acute right-sided thoracic back pain    Pain of right hand    Dizziness    Benign paroxysmal positional vertigo of left ear     Advance Care Planning Advance Directive is not on file.  ACP discussion was held with the patient during this visit. Patient does not have an advance directive, information provided.            Objective   Vitals:    12/19/24 1312   BP: (!) 111/39   BP Location: Right arm   Patient Position: Sitting   Pulse: 68   Temp: 98.1 °F (36.7 °C)   TempSrc: Oral   SpO2: 95%   Weight: 82.2 kg (181 lb 3.2 oz)   Height: 157.5 cm (62\")       Estimated body mass index is 33.14 kg/m² as calculated from the following:    Height as of this encounter: 157.5 cm (62\").    Weight as of this encounter: 82.2 kg (181 lb 3.2 oz).    BMI is >= 30 and <35. (Class 1 Obesity). The following options were offered after discussion;: exercise counseling/recommendations and nutrition counseling/recommendations           Does the patient have evidence of cognitive impairment? No  Lab Results   Component Value Date    TRIG 149 10/08/2024    HDL 38 (L) 10/08/2024    LDL 82 10/08/2024    VLDL 26 10/08/2024    HGBA1C 7.70 (H) 10/08/2024                                                                                                Health  Risk Assessment    Smoking Status:  Social History     Tobacco Use   Smoking Status Never   Smokeless Tobacco Never     Alcohol Consumption:  Social History     Substance and Sexual Activity   Alcohol Use No       Fall Risk Screen  STEADI Fall Risk Assessment was completed, and patient is at LOW risk for falls.Assessment completed on:12/19/2024    Depression " Screening   Little interest or pleasure in doing things? Not at all   Feeling down, depressed, or hopeless? Not at all   PHQ-2 Total Score 0      Health Habits and Functional and Cognitive Screenin/19/2024     1:11 PM   Functional & Cognitive Status   Do you have difficulty preparing food and eating? No   Do you have difficulty bathing yourself, getting dressed or grooming yourself? No   Do you have difficulty using the toilet? No   Do you have difficulty moving around from place to place? No   Do you have trouble with steps or getting out of a bed or a chair? No   Current Diet Well Balanced Diet   Dental Exam Up to date   Eye Exam Up to date   Exercise (times per week) 0 times per week   Current Exercises Include No Regular Exercise   Do you need help using the phone?  No   Are you deaf or do you have serious difficulty hearing?  No   Do you need help to go to places out of walking distance? No   Do you need help shopping? No   Do you need help preparing meals?  No   Do you need help with housework?  No   Do you need help with laundry? No   Do you need help taking your medications? No   Do you need help managing money? No   Do you ever drive or ride in a car without wearing a seat belt? No   Have you felt unusual stress, anger or loneliness in the last month? No   Who do you live with? Spouse   If you need help, do you have trouble finding someone available to you? No   Have you been bothered in the last four weeks by sexual problems? No   Do you have difficulty concentrating, remembering or making decisions? No           Age-appropriate Screening Schedule:  Refer to the list below for future screening recommendations based on patient's age, sex and/or medical conditions. Orders for these recommended tests are listed in the plan section. The patient has been provided with a written plan.    Health Maintenance List  Health Maintenance   Topic Date Due    DIABETIC FOOT EXAM  Never done    ZOSTER VACCINE (2  of 3) 01/09/2014    ANNUAL WELLNESS VISIT  12/06/2023    HEMOGLOBIN A1C  04/08/2025    DIABETIC EYE EXAM  04/10/2025    LIPID PANEL  10/08/2025    COLORECTAL CANCER SCREENING  10/27/2025    BMI FOLLOWUP  12/27/2025    DXA SCAN  11/01/2026    MAMMOGRAM  11/08/2026    TDAP/TD VACCINES (3 - Td or Tdap) 01/04/2032    HEPATITIS C SCREENING  Completed    COVID-19 Vaccine  Completed    INFLUENZA VACCINE  Completed    Pneumococcal Vaccine 65+  Completed    URINE MICROALBUMIN  Discontinued                                                                                                                                                CMS Preventative Services Quick Reference  Risk Factors Identified During Encounter  Inactivity/Sedentary: Patient was advised to exercise at least 150 minutes a week per CDC recommendations.    The above risks/problems have been discussed with the patient.  Pertinent information has been shared with the patient in the After Visit Summary.  An After Visit Summary and PPPS were made available to the patient.    Follow Up:   Next Medicare Wellness visit to be scheduled in 1 year.         Additional E&M Note during same encounter follows:  Patient has additional, significant, and separately identifiable condition(s)/problem(s) that require work above and beyond the Medicare Wellness Visit     Chief Complaint  Medicare Wellness-subsequent, Diabetes, Hyperlipidemia, Hypertension, and Hypothyroidism    Subjective   HPI  Kelsy is also being seen today for additional medical problem/s.        BPV symptoms are been better.  She basically only notices the dizziness when she lies down especially on the left side.  Sometimes she will feel it when she gets up from bed but will sit on the side of the bed for a while to recover.  She had done the modified Epley exercises symptoms went away so she quit doing the exercises and symptoms returned so she has started doing the exercises again.      Diabetes log of  "blood sugars.  Most days look pretty good but she does have a few days where was running in the 2-300 range..  Current diabetes medications include metformin, NovoLog 4 units in the morning 14 units at noon and 6 units at the evening meal with sliding scale insulin based on Premeal blood sugar, Lantus 66 units.    Her last hemoglobin A1c 7.7 October 8.          Right hand pain  EMG and nerve conduction study performed November 21  We reviewed results of the EMG NCS.  She states her symptoms have improved enough that she is not currently wanting to pursue further evaluation/treatment.  However if symptoms worsen she will let me know and we will get her into see hand surgeon.                        Objective   Vital Signs:  BP (!) 111/39 (BP Location: Right arm, Patient Position: Sitting)   Pulse 68   Temp 98.1 °F (36.7 °C) (Oral)   Ht 157.5 cm (62\")   Wt 82.2 kg (181 lb 3.2 oz)   SpO2 95%   BMI 33.14 kg/m²   Physical Exam  Vitals and nursing note reviewed.   Constitutional:       General: She is not in acute distress.     Appearance: Normal appearance. She is obese.   HENT:      Right Ear: Tympanic membrane and ear canal normal.      Left Ear: Tympanic membrane and ear canal normal.      Mouth/Throat:      Mouth: Mucous membranes are moist.      Pharynx: Oropharynx is clear. No oropharyngeal exudate.   Eyes:      General: No scleral icterus.     Conjunctiva/sclera: Conjunctivae normal.   Neck:      Vascular: No carotid bruit.   Cardiovascular:      Rate and Rhythm: Normal rate and regular rhythm.      Heart sounds: No murmur heard.     No friction rub. No gallop.   Pulmonary:      Effort: No respiratory distress.      Breath sounds: No wheezing or rales.   Abdominal:      General: Bowel sounds are normal.      Palpations: Abdomen is soft. There is no mass.      Tenderness: There is no abdominal tenderness. There is no guarding or rebound.   Musculoskeletal:         General: No swelling.      Right lower leg: No " edema.      Left lower leg: No edema.   Lymphadenopathy:      Cervical: No cervical adenopathy.   Skin:     Coloration: Skin is not jaundiced.      Findings: No lesion.   Neurological:      General: No focal deficit present.      Mental Status: She is alert and oriented to person, place, and time.   Psychiatric:         Mood and Affect: Mood normal.         Behavior: Behavior normal.         Thought Content: Thought content normal.         Judgment: Judgment normal.                       Assessment and Plan            Medicare annual wellness visit, subsequent  We reviewed the preventive service recommendations and created an individualized handout       Type 2 diabetes mellitus with stage 3a chronic kidney disease, with long-term current use of insulin  Had hemoglobin A1c checked in October, will hold off on repeat for now.  Redouble efforts at diet, exercise, weight loss.    Orders:    Hemoglobin A1c; Future    Comprehensive Metabolic Panel; Future    Microalbumin / Creatinine Urine Ratio - Urine, Clean Catch; Future    Hypertension, essential  Blood pressure looks good continue current regimen    Orders:    Comprehensive Metabolic Panel; Future    CBC Auto Differential; Future    Mixed hyperlipidemia  Lipid profile in October looks okay continue on current regimen.  Check lab prior to next visit.    Orders:    Lipid Panel; Future    Comprehensive Metabolic Panel; Future    Acquired hypothyroidism  Continue current medicine for now.  Check lab prior to next visit.  Orders:    TSH Rfx On Abnormal To Free T4; Future    Class 1 obesity due to excess calories with serious comorbidity and body mass index (BMI) of 33.0 to 33.9 in adult  Patient's (Body mass index is 33.14 kg/m².) indicates that they are  with health conditions that include  . Weight is . BMI  . We discussed .          High risk medication use    Orders:    POC Medline 12 Panel Urine Drug Screen            Follow Up   No follow-ups on file.  Patient was  given instructions and counseling regarding her condition or for health maintenance advice. Please see specific information pulled into the AVS if appropriate.

## 2024-12-19 NOTE — ASSESSMENT & PLAN NOTE
Had hemoglobin A1c checked in October, will hold off on repeat for now.  Redouble efforts at diet, exercise, weight loss.    Orders:    Hemoglobin A1c; Future    Comprehensive Metabolic Panel; Future    Microalbumin / Creatinine Urine Ratio - Urine, Clean Catch; Future

## 2024-12-19 NOTE — ASSESSMENT & PLAN NOTE
Lipid profile in October looks okay continue on current regimen.  Check lab prior to next visit.    Orders:    Lipid Panel; Future    Comprehensive Metabolic Panel; Future

## 2024-12-19 NOTE — ASSESSMENT & PLAN NOTE
Blood pressure looks good continue current regimen    Orders:    Comprehensive Metabolic Panel; Future    CBC Auto Differential; Future

## 2024-12-19 NOTE — TELEPHONE ENCOUNTER
She states that she did get her diabetic eye exam April 2024 please call [place] [facility] and get a copy of that report then update EMR

## 2024-12-20 DIAGNOSIS — E03.9 ACQUIRED HYPOTHYROIDISM: ICD-10-CM

## 2024-12-20 RX ORDER — LEVOTHYROXINE SODIUM 112 UG/1
112 TABLET ORAL DAILY
Qty: 90 TABLET | Refills: 0 | Status: SHIPPED | OUTPATIENT
Start: 2024-12-20

## 2024-12-27 ENCOUNTER — OFFICE VISIT (OUTPATIENT)
Dept: FAMILY MEDICINE CLINIC | Age: 74
End: 2024-12-27
Payer: MEDICARE

## 2024-12-27 VITALS
OXYGEN SATURATION: 94 % | HEART RATE: 69 BPM | HEIGHT: 62 IN | DIASTOLIC BLOOD PRESSURE: 79 MMHG | SYSTOLIC BLOOD PRESSURE: 122 MMHG | TEMPERATURE: 98.4 F | BODY MASS INDEX: 33.21 KG/M2 | WEIGHT: 180.5 LBS

## 2024-12-27 DIAGNOSIS — J02.9 SORE THROAT: ICD-10-CM

## 2024-12-27 DIAGNOSIS — R05.1 ACUTE COUGH: ICD-10-CM

## 2024-12-27 DIAGNOSIS — H61.23 BILATERAL IMPACTED CERUMEN: ICD-10-CM

## 2024-12-27 DIAGNOSIS — J06.9 ACUTE URI: Primary | ICD-10-CM

## 2024-12-27 DIAGNOSIS — R06.2 WHEEZING: ICD-10-CM

## 2024-12-27 LAB
EXPIRATION DATE: NORMAL
EXPIRATION DATE: NORMAL
FLUAV AG UPPER RESP QL IA.RAPID: NOT DETECTED
FLUBV AG UPPER RESP QL IA.RAPID: NOT DETECTED
INTERNAL CONTROL: NORMAL
INTERNAL CONTROL: NORMAL
Lab: NORMAL
Lab: NORMAL
S PYO AG THROAT QL: NEGATIVE
SARS-COV-2 AG UPPER RESP QL IA.RAPID: NOT DETECTED

## 2024-12-27 PROCEDURE — 87081 CULTURE SCREEN ONLY: CPT

## 2024-12-27 PROCEDURE — 87880 STREP A ASSAY W/OPTIC: CPT

## 2024-12-27 PROCEDURE — 99214 OFFICE O/P EST MOD 30 MIN: CPT

## 2024-12-27 RX ORDER — ALBUTEROL SULFATE 90 UG/1
2 INHALANT RESPIRATORY (INHALATION) EVERY 4 HOURS PRN
Qty: 8 G | Refills: 0 | Status: SHIPPED | OUTPATIENT
Start: 2024-12-27

## 2024-12-27 RX ORDER — DEXTROMETHORPHAN HYDROBROMIDE AND PROMETHAZINE HYDROCHLORIDE 15; 6.25 MG/5ML; MG/5ML
5 SYRUP ORAL NIGHTLY PRN
Qty: 118 ML | Refills: 0 | Status: SHIPPED | OUTPATIENT
Start: 2024-12-27

## 2024-12-27 NOTE — PROGRESS NOTES
Ear Cerumen Removal    Date/Time: 12/27/2024 9:44 AM    Performed by: Mallory Farley MA  Authorized by: Galina Sosa APRN    Anesthesia:  Local Anesthetic: none  Location details: left ear and right ear  Patient tolerance: patient tolerated the procedure well with no immediate complications  Procedure type: irrigation   Sedation:  Patient sedated: no

## 2024-12-27 NOTE — PROGRESS NOTES
Subjective     CHIEF COMPLAINT    Chief Complaint   Patient presents with    Cough    Shortness of Breath    Sore Throat     History of Present Illness  Patient is a 74-year-old female, presenting to the clinic today complaining of cough, sore throat, shortness of breath and wheezing.  She also notes she has lost her voice.  Symptoms started on 12-.  Denies fever or chills.  Cough is productive.  She has been taking Tylenol and Robitussin DM.  Denies chronic lung issues.  She does not smoke.  No known exposure to any illnesses.    Review of Systems   Constitutional:  Negative for chills and fever.   HENT:  Positive for sore throat and voice change. Negative for congestion and rhinorrhea.    Respiratory:  Positive for cough, shortness of breath and wheezing.    Cardiovascular:  Negative for chest pain.   Gastrointestinal:  Negative for nausea and vomiting.   Musculoskeletal:  Negative for myalgias.   Neurological:  Negative for headaches.     Past Medical History:   Diagnosis Date    Diabetes     High cholesterol     Hypertension     Hypothyroid      Past Surgical History:   Procedure Laterality Date    CORONARY ANGIOPLASTY WITH STENT PLACEMENT  2012    GALLBLADDER SURGERY  1995    LAPAROSCOPIC TUBAL LIGATION  2012    TOTAL SHOULDER REPLACEMENT Right 2012     Family History   Problem Relation Age of Onset    Cancer Mother     COPD Father     Heart disease Father     Diabetes Sister     Alcohol abuse Brother      Social History     Socioeconomic History    Marital status:    Tobacco Use    Smoking status: Never    Smokeless tobacco: Never   Vaping Use    Vaping status: Never Used   Substance and Sexual Activity    Alcohol use: No    Drug use: Never    Sexual activity: Yes     Partners: Male     Birth control/protection: Surgical     Allergies   Allergen Reactions    Morphine Other (See Comments)     Rapid heart beat      Plavix [Clopidogrel] Rash     Red ring around neck       Current Outpatient  Medications on File Prior to Visit   Medication Sig Dispense Refill    alendronate (FOSAMAX) 70 MG tablet TAKE 1 TABLET BY MOUTH ONCE WEEKLY ON AN EMPTY STOMACH BEFORE BREAKFAST. REMAIN UPRIGHT FOR 30 MINUTES AND TAKE WITH 8 OUNCES OF WATER 12 tablet 1    aspirin 81 MG chewable tablet Chew 1 tablet Daily.      atenolol-chlorthalidone (TENORETIC) 50-25 MG per tablet TAKE 1/2 TABLET BY MOUTH TWICE A DAY 90 tablet 1    atorvastatin (LIPITOR) 80 MG tablet Take 0.5 tablets by mouth Daily. 1 tablet 0    BD Pen Needle Ita U/F 32G X 4 MM misc USE AS DIRECTED WITH VICTOZA, LANTUS AND NOVOLOG 200 each 3    Calcium Carbonate-Vitamin D 600-200 MG-UNIT capsule Calcium 600 + D(3) 600 mg calcium- 200 unit oral capsule take 1 capsule by oral route daily   Active      coenzyme Q10 100 MG capsule Co Q-10 100 mg oral capsule take 1 capsule by oral route daily   Active      ezetimibe (ZETIA) 10 MG tablet Take 1 tablet by mouth Daily.      famotidine (PEPCID) 20 MG tablet Take 1 tablet by mouth Daily.      glucose blood (Accu-Chek Gricelda Plus) test strip USE TO TEST BLOOD SUGAR THREE TIMES A DAY DX E11.22 300 each 1    Insulin Glargine (Lantus SoloStar) 100 UNIT/ML injection pen INJECT 66 UNITS UNDER THE SKIN DAILY 18 mL 0    levothyroxine (SYNTHROID, LEVOTHROID) 112 MCG tablet TAKE 1 TABLET BY MOUTH DAILY 90 tablet 0    lisinopril (PRINIVIL,ZESTRIL) 40 MG tablet TAKE 1 TABLET BY MOUTH DAILY 90 tablet 1    meclizine (ANTIVERT) 25 MG tablet Take 1 tablet by mouth 3 (Three) Times a Day As Needed for Dizziness. 30 tablet 0    metFORMIN (GLUCOPHAGE) 850 MG tablet TAKE 1 TABLET BY MOUTH DAILY 90 tablet 1    NovoLOG FlexPen 100 UNIT/ML solution pen-injector sc pen INJECT 4 UNITS UNDER THE SKIN EVERY MORNING, THEN 14 UNITS WITH NOON MEAL, THEN 6 UNITS WITH EVENING MEAL ALONG WITH SLIDING SCALE MAX 40 UNITS DAILY 12 mL 2    pregabalin (LYRICA) 75 MG capsule TAKE 1 CAPSULE BY MOUTH 3 TIMES A DAY 90 capsule 0     No current facility-administered  "medications on file prior to visit.     /79   Pulse 69   Temp 98.4 °F (36.9 °C) (Oral)   Ht 157.5 cm (62\")   Wt 81.9 kg (180 lb 8 oz)   SpO2 94%   BMI 33.01 kg/m²     Objective     Physical Exam  Vitals and nursing note reviewed.   Constitutional:       General: She is not in acute distress.     Appearance: Normal appearance. She is not ill-appearing.   HENT:      Head: Normocephalic.      Right Ear: External ear normal. There is impacted cerumen.      Left Ear: External ear normal. There is impacted cerumen.      Nose: Nose normal.      Right Sinus: No maxillary sinus tenderness or frontal sinus tenderness.      Left Sinus: No maxillary sinus tenderness or frontal sinus tenderness.      Mouth/Throat:      Lips: Pink.      Mouth: Mucous membranes are moist.      Pharynx: Oropharynx is clear. Uvula midline. No pharyngeal swelling, oropharyngeal exudate, posterior oropharyngeal erythema or uvula swelling.      Comments: Hoarse voice noted   Eyes:      Pupils: Pupils are equal, round, and reactive to light.   Cardiovascular:      Rate and Rhythm: Normal rate and regular rhythm.      Heart sounds: Normal heart sounds. No murmur heard.  Pulmonary:      Effort: Pulmonary effort is normal. No accessory muscle usage or respiratory distress.      Breath sounds: Normal breath sounds. No wheezing or rhonchi.   Musculoskeletal:      Cervical back: Normal range of motion.   Lymphadenopathy:      Cervical: No cervical adenopathy.   Skin:     General: Skin is warm and dry.   Neurological:      General: No focal deficit present.      Mental Status: She is alert and oriented to person, place, and time.   Psychiatric:         Mood and Affect: Mood and affect normal.         Behavior: Behavior normal.          Lab Results (last 24 hours)       Procedure Component Value Units Date/Time    POCT SARS-CoV-2 Antigen KALYN + Flu [[id number]] Collected: 12/27/24 0858    Specimen: Swab Updated: 12/27/24 0858     SARS Antigen Not " Detected     Influenza A Antigen KALYN Not Detected     Influenza B Antigen KALYN Not Detected     Internal Control Passed     Lot Number [id number]     Expiration Date 7-11-25    POCT rapid strep A [[id number]] Collected: 12/27/24 0858    Specimen: Swab Updated: 12/27/24 0858     Rapid Strep A Screen Negative     Internal Control Passed     Lot Number [id number]     Expiration Date 11-30-25          Assessment & Plan  Acute URI  Patient is negative for COVID, flu and strep on rapid testing today.  No evidence of bacterial infection on exam.  Suspect viral illness.  Recommend symptomatic treatment which was discussed with patient.  Promethazine DM sent to pharmacy to use at night. Continue robitussin during the day. Increase fluid intake, rest, warm salt water gargles. Continue tylenol. Return if symptoms worsen or fail to improve.     Orders:    promethazine-dextromethorphan (PROMETHAZINE-DM) 6.25-15 MG/5ML syrup; Take 5 mL by mouth At Night As Needed for Cough.    Acute cough  Orders:    POCT SARS-CoV-2 Antigen KALYN + Flu    promethazine-dextromethorphan (PROMETHAZINE-DM) 6.25-15 MG/5ML syrup; Take 5 mL by mouth At Night As Needed for Cough.    Sore throat  Orders:    POCT rapid strep A    Beta Strep Culture, Throat - , Throat; Future    Beta Strep Culture, Throat - Swab, Throat    Bilateral impacted cerumen  Cerumen removal completed in office.        Wheezing  None heard on exam today, but sent in albuterol for patient to use PRN.   Orders:    albuterol sulfate  (90 Base) MCG/ACT inhaler; Inhale 2 puffs Every 4 (Four) Hours As Needed for Shortness of Air or Wheezing.         Follow up:  Return if symptoms worsen or fail to improve.  Patient was given instructions and counseling regarding her condition or for health maintenance advice. Please see specific information pulled into the AVS if appropriate.

## 2024-12-29 LAB — BACTERIA SPEC AEROBE CULT: NORMAL

## 2025-01-07 RX ORDER — INSULIN GLARGINE 100 [IU]/ML
INJECTION, SOLUTION SUBCUTANEOUS
Qty: 18 ML | Refills: 0 | Status: SHIPPED | OUTPATIENT
Start: 2025-01-07

## 2025-01-09 ENCOUNTER — TELEPHONE (OUTPATIENT)
Dept: FAMILY MEDICINE CLINIC | Age: 75
End: 2025-01-09
Payer: MEDICARE

## 2025-01-14 ENCOUNTER — TELEPHONE (OUTPATIENT)
Dept: FAMILY MEDICINE CLINIC | Age: 75
End: 2025-01-14
Payer: MEDICARE

## 2025-01-16 ENCOUNTER — LAB (OUTPATIENT)
Dept: LAB | Facility: HOSPITAL | Age: 75
End: 2025-01-16
Payer: MEDICARE

## 2025-01-16 DIAGNOSIS — I10 HYPERTENSION, ESSENTIAL: ICD-10-CM

## 2025-01-16 DIAGNOSIS — E78.2 MIXED HYPERLIPIDEMIA: ICD-10-CM

## 2025-01-16 DIAGNOSIS — Z79.4 TYPE 2 DIABETES MELLITUS WITH STAGE 3A CHRONIC KIDNEY DISEASE, WITH LONG-TERM CURRENT USE OF INSULIN: ICD-10-CM

## 2025-01-16 DIAGNOSIS — E03.9 ACQUIRED HYPOTHYROIDISM: ICD-10-CM

## 2025-01-16 DIAGNOSIS — N18.31 TYPE 2 DIABETES MELLITUS WITH STAGE 3A CHRONIC KIDNEY DISEASE, WITH LONG-TERM CURRENT USE OF INSULIN: ICD-10-CM

## 2025-01-16 DIAGNOSIS — E11.22 TYPE 2 DIABETES MELLITUS WITH STAGE 3A CHRONIC KIDNEY DISEASE, WITH LONG-TERM CURRENT USE OF INSULIN: ICD-10-CM

## 2025-01-16 LAB
ALBUMIN SERPL-MCNC: 4.2 G/DL (ref 3.5–5.2)
ALBUMIN UR-MCNC: <1.2 MG/DL
ALBUMIN/GLOB SERPL: 1.4 G/DL
ALP SERPL-CCNC: 75 U/L (ref 39–117)
ALT SERPL W P-5'-P-CCNC: 17 U/L (ref 1–33)
ANION GAP SERPL CALCULATED.3IONS-SCNC: 11.2 MMOL/L (ref 5–15)
AST SERPL-CCNC: 22 U/L (ref 1–32)
BASOPHILS # BLD AUTO: 0.06 10*3/MM3 (ref 0–0.2)
BASOPHILS NFR BLD AUTO: 1 % (ref 0–1.5)
BILIRUB SERPL-MCNC: 0.6 MG/DL (ref 0–1.2)
BUN SERPL-MCNC: 19 MG/DL (ref 8–23)
BUN/CREAT SERPL: 19 (ref 7–25)
CALCIUM SPEC-SCNC: 9.3 MG/DL (ref 8.6–10.5)
CHLORIDE SERPL-SCNC: 101 MMOL/L (ref 98–107)
CHOLEST SERPL-MCNC: 129 MG/DL (ref 0–200)
CO2 SERPL-SCNC: 28.8 MMOL/L (ref 22–29)
CREAT SERPL-MCNC: 1 MG/DL (ref 0.57–1)
CREAT UR-MCNC: 47.6 MG/DL
DEPRECATED RDW RBC AUTO: 50 FL (ref 37–54)
EGFRCR SERPLBLD CKD-EPI 2021: 59.6 ML/MIN/1.73
EOSINOPHIL # BLD AUTO: 0.37 10*3/MM3 (ref 0–0.4)
EOSINOPHIL NFR BLD AUTO: 5.9 % (ref 0.3–6.2)
ERYTHROCYTE [DISTWIDTH] IN BLOOD BY AUTOMATED COUNT: 14.7 % (ref 12.3–15.4)
GLOBULIN UR ELPH-MCNC: 2.9 GM/DL
GLUCOSE SERPL-MCNC: 117 MG/DL (ref 65–99)
HBA1C MFR BLD: 6.5 % (ref 4.8–5.6)
HCT VFR BLD AUTO: 39.9 % (ref 34–46.6)
HDLC SERPL-MCNC: 43 MG/DL (ref 40–60)
HGB BLD-MCNC: 12.8 G/DL (ref 12–15.9)
IMM GRANULOCYTES # BLD AUTO: 0 10*3/MM3 (ref 0–0.05)
IMM GRANULOCYTES NFR BLD AUTO: 0 % (ref 0–0.5)
LDLC SERPL CALC-MCNC: 61 MG/DL (ref 0–100)
LDLC/HDLC SERPL: 1.33 {RATIO}
LYMPHOCYTES # BLD AUTO: 1.66 10*3/MM3 (ref 0.7–3.1)
LYMPHOCYTES NFR BLD AUTO: 26.4 % (ref 19.6–45.3)
MCH RBC QN AUTO: 29.4 PG (ref 26.6–33)
MCHC RBC AUTO-ENTMCNC: 32.1 G/DL (ref 31.5–35.7)
MCV RBC AUTO: 91.7 FL (ref 79–97)
MICROALBUMIN/CREAT UR: NORMAL MG/G{CREAT}
MONOCYTES # BLD AUTO: 0.7 10*3/MM3 (ref 0.1–0.9)
MONOCYTES NFR BLD AUTO: 11.1 % (ref 5–12)
NEUTROPHILS NFR BLD AUTO: 3.49 10*3/MM3 (ref 1.7–7)
NEUTROPHILS NFR BLD AUTO: 55.6 % (ref 42.7–76)
PLATELET # BLD AUTO: 227 10*3/MM3 (ref 140–450)
PMV BLD AUTO: 10.6 FL (ref 6–12)
POTASSIUM SERPL-SCNC: 4.3 MMOL/L (ref 3.5–5.2)
PROT SERPL-MCNC: 7.1 G/DL (ref 6–8.5)
RBC # BLD AUTO: 4.35 10*6/MM3 (ref 3.77–5.28)
SODIUM SERPL-SCNC: 141 MMOL/L (ref 136–145)
T4 FREE SERPL-MCNC: 1.23 NG/DL (ref 0.92–1.68)
TRIGL SERPL-MCNC: 144 MG/DL (ref 0–150)
TSH SERPL DL<=0.05 MIU/L-ACNC: 6.23 UIU/ML (ref 0.27–4.2)
VLDLC SERPL-MCNC: 25 MG/DL (ref 5–40)
WBC NRBC COR # BLD AUTO: 6.28 10*3/MM3 (ref 3.4–10.8)

## 2025-01-16 PROCEDURE — 85025 COMPLETE CBC W/AUTO DIFF WBC: CPT

## 2025-01-16 PROCEDURE — 82043 UR ALBUMIN QUANTITATIVE: CPT

## 2025-01-16 PROCEDURE — 84439 ASSAY OF FREE THYROXINE: CPT

## 2025-01-16 PROCEDURE — 80061 LIPID PANEL: CPT

## 2025-01-16 PROCEDURE — 83036 HEMOGLOBIN GLYCOSYLATED A1C: CPT

## 2025-01-16 PROCEDURE — 84443 ASSAY THYROID STIM HORMONE: CPT

## 2025-01-16 PROCEDURE — 82570 ASSAY OF URINE CREATININE: CPT

## 2025-01-16 PROCEDURE — 80053 COMPREHEN METABOLIC PANEL: CPT

## 2025-01-16 PROCEDURE — 36415 COLL VENOUS BLD VENIPUNCTURE: CPT

## 2025-01-17 DIAGNOSIS — M54.17 LUMBOSACRAL RADICULOPATHY: ICD-10-CM

## 2025-01-17 DIAGNOSIS — Z79.899 HIGH RISK MEDICATION USE: ICD-10-CM

## 2025-01-17 RX ORDER — PREGABALIN 75 MG/1
75 CAPSULE ORAL 3 TIMES DAILY
Qty: 90 CAPSULE | Refills: 1 | Status: SHIPPED | OUTPATIENT
Start: 2025-01-17

## 2025-02-05 DIAGNOSIS — N18.31 TYPE 2 DIABETES MELLITUS WITH STAGE 3A CHRONIC KIDNEY DISEASE, WITH LONG-TERM CURRENT USE OF INSULIN: Primary | ICD-10-CM

## 2025-02-05 DIAGNOSIS — E11.22 TYPE 2 DIABETES MELLITUS WITH STAGE 3A CHRONIC KIDNEY DISEASE, WITH LONG-TERM CURRENT USE OF INSULIN: Primary | ICD-10-CM

## 2025-02-05 DIAGNOSIS — Z79.4 TYPE 2 DIABETES MELLITUS WITH STAGE 3A CHRONIC KIDNEY DISEASE, WITH LONG-TERM CURRENT USE OF INSULIN: Primary | ICD-10-CM

## 2025-02-05 RX ORDER — INSULIN GLARGINE 100 [IU]/ML
INJECTION, SOLUTION SUBCUTANEOUS
Qty: 18 ML | Refills: 0 | Status: SHIPPED | OUTPATIENT
Start: 2025-02-05

## 2025-02-13 DIAGNOSIS — I10 ESSENTIAL HYPERTENSION: ICD-10-CM

## 2025-02-13 RX ORDER — LISINOPRIL 40 MG/1
40 TABLET ORAL DAILY
Qty: 90 TABLET | Refills: 1 | Status: SHIPPED | OUTPATIENT
Start: 2025-02-13

## 2025-02-13 RX ORDER — ATENOLOL AND CHLORTHALIDONE TABLET 50; 25 MG/1; MG/1
TABLET ORAL
Qty: 90 TABLET | Refills: 1 | Status: SHIPPED | OUTPATIENT
Start: 2025-02-13

## 2025-03-07 DIAGNOSIS — N18.31 TYPE 2 DIABETES MELLITUS WITH STAGE 3A CHRONIC KIDNEY DISEASE, WITH LONG-TERM CURRENT USE OF INSULIN: ICD-10-CM

## 2025-03-07 DIAGNOSIS — Z79.4 TYPE 2 DIABETES MELLITUS WITH STAGE 3A CHRONIC KIDNEY DISEASE, WITH LONG-TERM CURRENT USE OF INSULIN: ICD-10-CM

## 2025-03-07 DIAGNOSIS — E11.22 TYPE 2 DIABETES MELLITUS WITH STAGE 3A CHRONIC KIDNEY DISEASE, WITH LONG-TERM CURRENT USE OF INSULIN: ICD-10-CM

## 2025-03-08 RX ORDER — INSULIN ASPART 100 [IU]/ML
INJECTION, SOLUTION INTRAVENOUS; SUBCUTANEOUS
Qty: 12 ML | Refills: 2 | Status: SHIPPED | OUTPATIENT
Start: 2025-03-08

## 2025-03-10 RX ORDER — INSULIN GLARGINE 100 [IU]/ML
INJECTION, SOLUTION SUBCUTANEOUS
Qty: 18 ML | Refills: 3 | Status: SHIPPED | OUTPATIENT
Start: 2025-03-10

## 2025-03-17 DIAGNOSIS — Z79.899 HIGH RISK MEDICATION USE: ICD-10-CM

## 2025-03-17 DIAGNOSIS — M54.17 LUMBOSACRAL RADICULOPATHY: ICD-10-CM

## 2025-03-18 NOTE — TELEPHONE ENCOUNTER
Controlled refill request:  Requested Prescriptions     Pending Prescriptions Disp Refills    pregabalin (LYRICA) 75 MG capsule [Pharmacy Med Name: PREGABALIN 75 MG CAPSULE] 90 capsule      Sig: TAKE 1 CAPSULE BY MOUTH 3 TIMES A DAY      Last OV:  12/19/2024  Next OV:  3/31/2025  Last fill:  1/17/25 x1  Last tox:  12/19/24

## 2025-03-19 RX ORDER — PREGABALIN 75 MG/1
75 CAPSULE ORAL 3 TIMES DAILY
Qty: 90 CAPSULE | Refills: 2 | Status: SHIPPED | OUTPATIENT
Start: 2025-03-19

## 2025-03-23 DIAGNOSIS — E03.9 ACQUIRED HYPOTHYROIDISM: ICD-10-CM

## 2025-03-24 RX ORDER — LEVOTHYROXINE SODIUM 112 UG/1
112 TABLET ORAL DAILY
Qty: 90 TABLET | Refills: 0 | Status: SHIPPED | OUTPATIENT
Start: 2025-03-24

## 2025-03-31 ENCOUNTER — OFFICE VISIT (OUTPATIENT)
Dept: FAMILY MEDICINE CLINIC | Age: 75
End: 2025-03-31
Payer: MEDICARE

## 2025-03-31 ENCOUNTER — LAB (OUTPATIENT)
Dept: LAB | Facility: HOSPITAL | Age: 75
End: 2025-03-31
Payer: MEDICARE

## 2025-03-31 VITALS
BODY MASS INDEX: 33.82 KG/M2 | TEMPERATURE: 97.9 F | DIASTOLIC BLOOD PRESSURE: 73 MMHG | HEIGHT: 62 IN | WEIGHT: 183.8 LBS | HEART RATE: 64 BPM | SYSTOLIC BLOOD PRESSURE: 114 MMHG | OXYGEN SATURATION: 96 %

## 2025-03-31 DIAGNOSIS — I25.10 CORONARY ARTERY DISEASE INVOLVING NATIVE CORONARY ARTERY OF NATIVE HEART WITHOUT ANGINA PECTORIS: ICD-10-CM

## 2025-03-31 DIAGNOSIS — E03.9 ACQUIRED HYPOTHYROIDISM: ICD-10-CM

## 2025-03-31 DIAGNOSIS — E78.2 MIXED HYPERLIPIDEMIA: ICD-10-CM

## 2025-03-31 DIAGNOSIS — N18.31 TYPE 2 DIABETES MELLITUS WITH STAGE 3A CHRONIC KIDNEY DISEASE, WITH LONG-TERM CURRENT USE OF INSULIN: Primary | ICD-10-CM

## 2025-03-31 DIAGNOSIS — Z79.4 TYPE 2 DIABETES MELLITUS WITH STAGE 3A CHRONIC KIDNEY DISEASE, WITH LONG-TERM CURRENT USE OF INSULIN: Primary | ICD-10-CM

## 2025-03-31 DIAGNOSIS — E11.22 TYPE 2 DIABETES MELLITUS WITH STAGE 3A CHRONIC KIDNEY DISEASE, WITH LONG-TERM CURRENT USE OF INSULIN: Primary | ICD-10-CM

## 2025-03-31 DIAGNOSIS — I10 HYPERTENSION, ESSENTIAL: ICD-10-CM

## 2025-03-31 LAB
ALBUMIN SERPL-MCNC: 4.3 G/DL (ref 3.5–5.2)
ALBUMIN/GLOB SERPL: 1.5 G/DL
ALP SERPL-CCNC: 78 U/L (ref 39–117)
ALT SERPL W P-5'-P-CCNC: 25 U/L (ref 1–33)
ANION GAP SERPL CALCULATED.3IONS-SCNC: 11.6 MMOL/L (ref 5–15)
AST SERPL-CCNC: 27 U/L (ref 1–32)
BASOPHILS # BLD AUTO: 0.06 10*3/MM3 (ref 0–0.2)
BASOPHILS NFR BLD AUTO: 0.9 % (ref 0–1.5)
BILIRUB SERPL-MCNC: 0.4 MG/DL (ref 0–1.2)
BUN SERPL-MCNC: 41 MG/DL (ref 8–23)
BUN/CREAT SERPL: 29.9 (ref 7–25)
CALCIUM SPEC-SCNC: 9.7 MG/DL (ref 8.6–10.5)
CHLORIDE SERPL-SCNC: 102 MMOL/L (ref 98–107)
CHOLEST SERPL-MCNC: 104 MG/DL (ref 0–200)
CO2 SERPL-SCNC: 26.4 MMOL/L (ref 22–29)
CREAT SERPL-MCNC: 1.37 MG/DL (ref 0.57–1)
DEPRECATED RDW RBC AUTO: 46.9 FL (ref 37–54)
EGFRCR SERPLBLD CKD-EPI 2021: 40.6 ML/MIN/1.73
EOSINOPHIL # BLD AUTO: 0.49 10*3/MM3 (ref 0–0.4)
EOSINOPHIL NFR BLD AUTO: 7 % (ref 0.3–6.2)
ERYTHROCYTE [DISTWIDTH] IN BLOOD BY AUTOMATED COUNT: 13.9 % (ref 12.3–15.4)
GLOBULIN UR ELPH-MCNC: 2.8 GM/DL
GLUCOSE SERPL-MCNC: 101 MG/DL (ref 65–99)
HBA1C MFR BLD: 7.5 % (ref 4.8–5.6)
HCT VFR BLD AUTO: 41.9 % (ref 34–46.6)
HDLC SERPL-MCNC: 32 MG/DL (ref 40–60)
HGB BLD-MCNC: 13.5 G/DL (ref 12–15.9)
IMM GRANULOCYTES # BLD AUTO: 0.01 10*3/MM3 (ref 0–0.05)
IMM GRANULOCYTES NFR BLD AUTO: 0.1 % (ref 0–0.5)
LDLC SERPL CALC-MCNC: 52 MG/DL (ref 0–100)
LDLC/HDLC SERPL: 1.58 {RATIO}
LYMPHOCYTES # BLD AUTO: 1.64 10*3/MM3 (ref 0.7–3.1)
LYMPHOCYTES NFR BLD AUTO: 23.5 % (ref 19.6–45.3)
MCH RBC QN AUTO: 29.3 PG (ref 26.6–33)
MCHC RBC AUTO-ENTMCNC: 32.2 G/DL (ref 31.5–35.7)
MCV RBC AUTO: 90.9 FL (ref 79–97)
MONOCYTES # BLD AUTO: 0.8 10*3/MM3 (ref 0.1–0.9)
MONOCYTES NFR BLD AUTO: 11.5 % (ref 5–12)
NEUTROPHILS NFR BLD AUTO: 3.97 10*3/MM3 (ref 1.7–7)
NEUTROPHILS NFR BLD AUTO: 57 % (ref 42.7–76)
PLATELET # BLD AUTO: 198 10*3/MM3 (ref 140–450)
PMV BLD AUTO: 11.4 FL (ref 6–12)
POTASSIUM SERPL-SCNC: 4.6 MMOL/L (ref 3.5–5.2)
PROT SERPL-MCNC: 7.1 G/DL (ref 6–8.5)
RBC # BLD AUTO: 4.61 10*6/MM3 (ref 3.77–5.28)
SODIUM SERPL-SCNC: 140 MMOL/L (ref 136–145)
TRIGL SERPL-MCNC: 107 MG/DL (ref 0–150)
TSH SERPL DL<=0.05 MIU/L-ACNC: 1.65 UIU/ML (ref 0.27–4.2)
VLDLC SERPL-MCNC: 20 MG/DL (ref 5–40)
WBC NRBC COR # BLD AUTO: 6.97 10*3/MM3 (ref 3.4–10.8)

## 2025-03-31 PROCEDURE — 2028F FOOT EXAM PERFORMED: CPT | Performed by: FAMILY MEDICINE

## 2025-03-31 PROCEDURE — 84443 ASSAY THYROID STIM HORMONE: CPT | Performed by: FAMILY MEDICINE

## 2025-03-31 PROCEDURE — 83036 HEMOGLOBIN GLYCOSYLATED A1C: CPT | Performed by: FAMILY MEDICINE

## 2025-03-31 PROCEDURE — 99214 OFFICE O/P EST MOD 30 MIN: CPT | Performed by: FAMILY MEDICINE

## 2025-03-31 PROCEDURE — 80061 LIPID PANEL: CPT | Performed by: FAMILY MEDICINE

## 2025-03-31 PROCEDURE — 80053 COMPREHEN METABOLIC PANEL: CPT | Performed by: FAMILY MEDICINE

## 2025-03-31 PROCEDURE — 3044F HG A1C LEVEL LT 7.0%: CPT | Performed by: FAMILY MEDICINE

## 2025-03-31 PROCEDURE — 3074F SYST BP LT 130 MM HG: CPT | Performed by: FAMILY MEDICINE

## 2025-03-31 PROCEDURE — 85025 COMPLETE CBC W/AUTO DIFF WBC: CPT | Performed by: FAMILY MEDICINE

## 2025-03-31 PROCEDURE — 1126F AMNT PAIN NOTED NONE PRSNT: CPT | Performed by: FAMILY MEDICINE

## 2025-03-31 PROCEDURE — 36415 COLL VENOUS BLD VENIPUNCTURE: CPT | Performed by: FAMILY MEDICINE

## 2025-03-31 PROCEDURE — 3078F DIAST BP <80 MM HG: CPT | Performed by: FAMILY MEDICINE

## 2025-03-31 PROCEDURE — G2211 COMPLEX E/M VISIT ADD ON: HCPCS | Performed by: FAMILY MEDICINE

## 2025-03-31 RX ORDER — INSULIN ASPART 100 [IU]/ML
INJECTION, SOLUTION INTRAVENOUS; SUBCUTANEOUS
Qty: 12 ML | Refills: 2 | Status: SHIPPED | OUTPATIENT
Start: 2025-03-31

## 2025-03-31 NOTE — ASSESSMENT & PLAN NOTE
She had coronary artery stenting in 2012.  She is not currently having any chest pain or symptomatology attributable to ischemic heart disease.  Not sure about the recommendation for stress test.  Send her for second opinion.

## 2025-03-31 NOTE — PROGRESS NOTES
Chief Complaint  Diabetes    Subjective          Kelsy Rider presents to [facility]  History of Present Illness    Brings in a log of blood pressure readings.  Still substantial number of elevated readings.  She voices frustration.  Says that she is running out of her NovoLog because she is using more the sliding scale.  Currently her insulin doses are Lantus 66 units daily (sometimes more), NovoLog she takes 4 in the morning., 14 midday, 6 in the evening along with sliding scale insulin.  She is also taking metformin 850 mg daily.    Review of chart shows she was in the emergency room December 29 with URI was treated with antibiotics doxycycline.    Also had follow-up with cardiology, Dr. Edward, March 25 and that note is reviewed.      Current Outpatient Medications on File Prior to Visit   Medication Sig Dispense Refill    alendronate (FOSAMAX) 70 MG tablet TAKE 1 TABLET BY MOUTH ONCE WEEKLY ON AN EMPTY STOMACH BEFORE BREAKFAST. REMAIN UPRIGHT FOR 30 MINUTES AND TAKE WITH 8 OUNCES OF WATER 12 tablet 1    aspirin 81 MG chewable tablet Chew 1 tablet Daily.      atenolol-chlorthalidone (TENORETIC) 50-25 MG per tablet TAKE 1/2 TABLET BY MOUTH TWICE A DAY 90 tablet 1    atorvastatin (LIPITOR) 80 MG tablet Take 0.5 tablets by mouth Daily. 1 tablet 0    BD Pen Needle Ita U/F 32G X 4 MM misc USE AS DIRECTED WITH VICTOZA, LANTUS AND NOVOLOG 200 each 3    Calcium Carbonate-Vitamin D 600-200 MG-UNIT capsule Calcium 600 + D(3) 600 mg calcium- 200 unit oral capsule take 1 capsule by oral route daily   Active      coenzyme Q10 100 MG capsule Co Q-10 100 mg oral capsule take 1 capsule by oral route daily   Active      ezetimibe (ZETIA) 10 MG tablet Take 1 tablet by mouth Daily.      famotidine (PEPCID) 20 MG tablet Take 1 tablet by mouth Daily.      glucose blood (Accu-Chek Gricelda Plus) test strip USE TO TEST BLOOD SUGAR THREE TIMES A DAY DX E11.22 300 each 1    Insulin Glargine (Lantus  "SoloStar) 100 UNIT/ML injection pen INJECT 66 UNITS UNDER THE SKIN DAILY 18 mL 3    levothyroxine (SYNTHROID, LEVOTHROID) 112 MCG tablet TAKE 1 TABLET BY MOUTH DAILY 90 tablet 0    lisinopril (PRINIVIL,ZESTRIL) 40 MG tablet TAKE 1 TABLET BY MOUTH DAILY 90 tablet 1    meclizine (ANTIVERT) 25 MG tablet Take 1 tablet by mouth 3 (Three) Times a Day As Needed for Dizziness. 30 tablet 0    metFORMIN (GLUCOPHAGE) 850 MG tablet TAKE 1 TABLET BY MOUTH DAILY 90 tablet 1    pregabalin (LYRICA) 75 MG capsule TAKE 1 CAPSULE BY MOUTH 3 TIMES A DAY 90 capsule 2     No current facility-administered medications on file prior to visit.       Review of Systems         Objective   Vital Signs:   /73 (BP Location: Left arm, Patient Position: Sitting)   Pulse 64   Temp 97.9 °F (36.6 °C) (Oral)   Ht 157.5 cm (62\")   Wt 83.4 kg (183 lb 12.8 oz)   SpO2 96%   BMI 33.62 kg/m²     Physical Exam  Constitutional:       Appearance: Normal appearance.   Neck:      Vascular: No carotid bruit.   Cardiovascular:      Rate and Rhythm: Normal rate and regular rhythm.      Heart sounds: Normal heart sounds. No murmur heard.  Pulmonary:      Effort: No respiratory distress.      Breath sounds: No wheezing or rales.   Musculoskeletal:      Right lower leg: No edema.      Left lower leg: No edema.      Right foot: No deformity.      Left foot: No deformity.   Feet:      Right foot:      Protective Sensation: 5 sites tested.  5 sites sensed.      Skin integrity: No ulcer or callus.      Toenail Condition: Right toenails are normal.      Left foot:      Protective Sensation: 5 sites tested.  5 sites sensed.      Skin integrity: No ulcer or callus.      Toenail Condition: Left toenails are normal.      Comments: Dorsalis pedis posterior tibial pulses are good bilateral and feet are without concerning calluses, ulcerations, or other deformities. Sensation intact to monofilament testing.      Diabetic Foot Exam Performed and Monofilament Test " Performed  Lymphadenopathy:      Cervical: No cervical adenopathy.   Neurological:      General: No focal deficit present.      Mental Status: She is alert.   Psychiatric:         Attention and Perception: Attention normal.         Mood and Affect: Mood normal.         Speech: Speech normal.            Result Review :   The following data was reviewed by: Jonnie Zuñiga MD on 03/31/2025:  T4, Free (01/16/2025 08:56)  Microalbumin / Creatinine Urine Ratio - Urine, Clean Catch (01/16/2025 08:56)  CBC Auto Differential (01/16/2025 08:56)  Comprehensive Metabolic Panel (01/16/2025 08:56)  Lipid Panel (01/16/2025 08:56)  Hemoglobin A1c (01/16/2025 08:56)  TSH Rfx On Abnormal To Free T4 (01/16/2025 08:56)                Assessment and Plan    Diagnoses and all orders for this visit:    1. Type 2 diabetes mellitus with stage 3a chronic kidney disease, with long-term current use of insulin (Primary)  Assessment & Plan:  I am going to increase the availability of her NovoLog.  Hopefully she will not need it though because were also going to start her on Mounjaro.  Discussed possible side effects of the Mounjaro with her.  Asked her to call back and the end of the month let us know how she tolerates.  If blood sugars start running low let us know and we can decrease the dose of the insulin.      Orders:  -     Tirzepatide 2.5 MG/0.5ML solution auto-injector; Inject 2.5 mg under the skin into the appropriate area as directed 1 (One) Time Per Week.  Dispense: 2 mL; Refill: 0  -     insulin aspart (NovoLOG FlexPen) 100 UNIT/ML solution pen-injector sc pen; INJECT 4 UNITS UNDER THE SKIN EVERY MORNING, THEN 14 UNITS WITH NOON MEAL, THEN 6 UNITS WITH EVENING MEAL ALONG WITH SLIDING SCALE MAX 50 UNITS DAILY  Dispense: 12 mL; Refill: 2  -     Hemoglobin A1c    2. Hypertension, essential  Assessment & Plan:  Blood pressure okay continue on current regimen     Orders:  -     Comprehensive Metabolic Panel  -     CBC Auto  Differential    3. Acquired hypothyroidism  Assessment & Plan:  Repeat lab today predicate change based on result    Orders:  -     TSH Rfx On Abnormal To Free T4    4. Mixed hyperlipidemia  Assessment & Plan:  Will check labs predicate medication change based on result.  Her previous LDL was in the 60s which I think is quite good.  She says the cardiologist was wanting her to go for IV infusion.  After am not sure that is necessary but discuss with Dr. Bhatia.    Orders:  -     Lipid Panel    5. Coronary artery disease involving native coronary artery of native heart without angina pectoris  Assessment & Plan:  She had coronary artery stenting in 2012.  She is not currently having any chest pain or symptomatology attributable to ischemic heart disease.  Not sure about the recommendation for stress test.  Send her for second opinion.      Orders:  -     Ambulatory Referral to Cardiology        Follow Up   No follow-ups on file.  Patient was given instructions and counseling regarding her condition or for health maintenance advice. Please see specific information pulled into the AVS if appropriate.

## 2025-03-31 NOTE — ASSESSMENT & PLAN NOTE
I am going to increase the availability of her NovoLog.  Hopefully she will not need it though because were also going to start her on Mounjaro.  Discussed possible side effects of the Mounjaro with her.  Asked her to call back and the end of the month let us know how she tolerates.  If blood sugars start running low let us know and we can decrease the dose of the insulin.

## 2025-03-31 NOTE — ASSESSMENT & PLAN NOTE
Will check labs predicate medication change based on result.  Her previous LDL was in the 60s which I think is quite good.  She says the cardiologist was wanting her to go for IV infusion.  After am not sure that is necessary but discuss with Dr. Bhatia.

## 2025-04-01 ENCOUNTER — RESULTS FOLLOW-UP (OUTPATIENT)
Dept: FAMILY MEDICINE CLINIC | Age: 75
End: 2025-04-01
Payer: MEDICARE

## 2025-04-04 RX ORDER — ALENDRONATE SODIUM 70 MG/1
70 TABLET ORAL WEEKLY
Qty: 12 TABLET | Refills: 1 | Status: SHIPPED | OUTPATIENT
Start: 2025-04-04

## 2025-04-23 ENCOUNTER — TELEPHONE (OUTPATIENT)
Dept: FAMILY MEDICINE CLINIC | Age: 75
End: 2025-04-23
Payer: MEDICARE

## 2025-04-24 DIAGNOSIS — N18.32 STAGE 3B CHRONIC KIDNEY DISEASE: Primary | ICD-10-CM

## 2025-05-02 ENCOUNTER — HOSPITAL ENCOUNTER (OUTPATIENT)
Dept: ULTRASOUND IMAGING | Facility: HOSPITAL | Age: 75
Discharge: HOME | End: 2025-05-02
Payer: MEDICARE

## 2025-05-02 DIAGNOSIS — N18.32 STAGE 3B CHRONIC KIDNEY DISEASE: ICD-10-CM

## 2025-05-02 PROCEDURE — 76775 US EXAM ABDO BACK WALL LIM: CPT

## 2025-05-08 NOTE — ASSESSMENT & PLAN NOTE
Doing fine regards to her spinal stenosis   [FreeTextEntry1] : I have independently reviewed these scans: CT C/A/P on 3/18/25

## 2025-05-09 DIAGNOSIS — E11.22 TYPE 2 DIABETES MELLITUS WITH STAGE 3A CHRONIC KIDNEY DISEASE, WITH LONG-TERM CURRENT USE OF INSULIN: ICD-10-CM

## 2025-05-09 DIAGNOSIS — Z79.4 TYPE 2 DIABETES MELLITUS WITH STAGE 3A CHRONIC KIDNEY DISEASE, WITH LONG-TERM CURRENT USE OF INSULIN: ICD-10-CM

## 2025-05-09 DIAGNOSIS — N18.31 TYPE 2 DIABETES MELLITUS WITH STAGE 3A CHRONIC KIDNEY DISEASE, WITH LONG-TERM CURRENT USE OF INSULIN: ICD-10-CM

## 2025-05-13 ENCOUNTER — OFFICE VISIT (OUTPATIENT)
Age: 75
End: 2025-05-13
Payer: MEDICARE

## 2025-05-13 VITALS
DIASTOLIC BLOOD PRESSURE: 88 MMHG | BODY MASS INDEX: 32.2 KG/M2 | HEART RATE: 73 BPM | SYSTOLIC BLOOD PRESSURE: 128 MMHG | HEIGHT: 62 IN | WEIGHT: 175 LBS

## 2025-05-13 DIAGNOSIS — E78.2 MIXED HYPERLIPIDEMIA: ICD-10-CM

## 2025-05-13 DIAGNOSIS — I10 HYPERTENSION, ESSENTIAL: ICD-10-CM

## 2025-05-13 DIAGNOSIS — I25.10 CORONARY ARTERY DISEASE INVOLVING NATIVE CORONARY ARTERY OF NATIVE HEART WITHOUT ANGINA PECTORIS: Primary | ICD-10-CM

## 2025-05-13 PROCEDURE — 3074F SYST BP LT 130 MM HG: CPT | Performed by: INTERNAL MEDICINE

## 2025-05-13 PROCEDURE — 99204 OFFICE O/P NEW MOD 45 MIN: CPT | Performed by: INTERNAL MEDICINE

## 2025-05-13 PROCEDURE — 3079F DIAST BP 80-89 MM HG: CPT | Performed by: INTERNAL MEDICINE

## 2025-05-13 NOTE — PROGRESS NOTES
Chief Complaint  Coronary Artery Disease and Establish Care    Subjective        Kelsy Rider presents to [facility] CARDIOLOGY  History of present illness:    Patient is a 75-year-old female with history of coronary artery disease.  She had a stent in 2011 to her first OM.  She then had repeat left heart cath/20 5/2012 that showed widely patent OM1 stent and at most 30% stenosis in the RCA and left circumflex.  She has a history of hypertension and hyperlipidemia.  She is fairly active but is limited by back pain.  She does not have a regular exercise program.  When she is walking around she notes no chest pain.  She denies any palpitations or edema.  She is taking the aspirin every day.  She does not smoke or drink alcohol.  Her father has a history of heart attack.      Past Medical History:   Diagnosis Date    Diabetes     High cholesterol     Hypertension     Hypothyroid          Past Surgical History:   Procedure Laterality Date    CORONARY ANGIOPLASTY WITH STENT PLACEMENT  2012    GALLBLADDER SURGERY  1995    LAPAROSCOPIC TUBAL LIGATION  2012    TOTAL SHOULDER REPLACEMENT Right 2012          Social History     Socioeconomic History    Marital status:    Tobacco Use    Smoking status: Never    Smokeless tobacco: Never   Vaping Use    Vaping status: Never Used   Substance and Sexual Activity    Alcohol use: No    Drug use: Never    Sexual activity: Yes     Partners: Male     Birth control/protection: Surgical         Family History   Problem Relation Age of Onset    Cancer Mother     COPD Father     Heart disease Father     Diabetes Sister     Alcohol abuse Brother           Allergies   Allergen Reactions    Morphine Other (See Comments)     Rapid heart beat      Plavix [Clopidogrel] Rash     Red ring around neck              Current Outpatient Medications:     alendronate (FOSAMAX) 70 MG tablet, TAKE 1 TABLET BY MOUTH ONCE WEEKLY ON AN EMPTY STOMACH BEFORE BREAKFAST. REMAIN UPRIGHT  FOR 30 MINUTES AND TAKE WITH 8 OUNCES OF WATER, Disp: 12 tablet, Rfl: 1    aspirin 81 MG chewable tablet, Chew 1 tablet Daily., Disp: , Rfl:     atenolol-chlorthalidone (TENORETIC) 50-25 MG per tablet, TAKE 1/2 TABLET BY MOUTH TWICE A DAY, Disp: 90 tablet, Rfl: 1    atorvastatin (LIPITOR) 80 MG tablet, Take 0.5 tablets by mouth Daily., Disp: 1 tablet, Rfl: 0    BD Pen Needle Ita U/F 32G X 4 MM misc, USE AS DIRECTED WITH VICTOZA, LANTUS AND NOVOLOG, Disp: 200 each, Rfl: 3    Calcium Carbonate-Vitamin D 600-200 MG-UNIT capsule, Calcium 600 + D(3) 600 mg calcium- 200 unit oral capsule take 1 capsule by oral route daily   Active, Disp: , Rfl:     coenzyme Q10 100 MG capsule, Co Q-10 100 mg oral capsule take 1 capsule by oral route daily   Active, Disp: , Rfl:     ezetimibe (ZETIA) 10 MG tablet, Take 1 tablet by mouth Daily., Disp: , Rfl:     famotidine (PEPCID) 20 MG tablet, Take 1 tablet by mouth Daily., Disp: , Rfl:     glucose blood (Accu-Chek Gricelda Plus) test strip, USE TO TEST BLOOD SUGAR THREE TIMES A DAY DX E11.22, Disp: 300 each, Rfl: 1    insulin aspart (NovoLOG FlexPen) 100 UNIT/ML solution pen-injector sc pen, INJECT 4 UNITS UNDER THE SKIN EVERY MORNING, THEN 14 UNITS WITH NOON MEAL, THEN 6 UNITS WITH EVENING MEAL ALONG WITH SLIDING SCALE MAX 50 UNITS DAILY, Disp: 12 mL, Rfl: 2    Insulin Glargine (Lantus SoloStar) 100 UNIT/ML injection pen, INJECT 66 UNITS UNDER THE SKIN DAILY, Disp: 18 mL, Rfl: 3    levothyroxine (SYNTHROID, LEVOTHROID) 112 MCG tablet, TAKE 1 TABLET BY MOUTH DAILY, Disp: 90 tablet, Rfl: 0    lisinopril (PRINIVIL,ZESTRIL) 40 MG tablet, TAKE 1 TABLET BY MOUTH DAILY, Disp: 90 tablet, Rfl: 1    meclizine (ANTIVERT) 25 MG tablet, Take 1 tablet by mouth 3 (Three) Times a Day As Needed for Dizziness., Disp: 30 tablet, Rfl: 0    metFORMIN (GLUCOPHAGE) 850 MG tablet, TAKE 1 TABLET BY MOUTH DAILY, Disp: 90 tablet, Rfl: 1    pregabalin (LYRICA) 75 MG capsule, TAKE 1 CAPSULE BY MOUTH 3 TIMES A DAY,  "Disp: 90 capsule, Rfl: 2    Tirzepatide 2.5 MG/0.5ML solution auto-injector, Inject 2.5 mg under the skin into the appropriate area as directed 1 (One) Time Per Week., Disp: 2 mL, Rfl: 0      ROS:  Cardiac review of systems is negative.    Objective     /88   Pulse 73   Ht 157.5 cm (62\")   Wt 79.4 kg (175 lb)   BMI 32.01 kg/m²       General Appearance:   well developed  well nourished  HENT:   oropharynx moist  lips not cyanotic  Respiratory:  no respiratory distress  normal breath sounds  no rales  Cardiovascular:  no jugular venous distention  regular rhythm  S1 normal, S2 normal  no S3, no S4   no murmur  no rub, no thrill  No carotid bruit  pedal pulses normal  lower extremity edema: none    Musculoskeletal:  no clubbing of fingers.   normocephalic, head atraumatic  Skin:   warm, dry  Psychiatric:  judgement and insight appropriate  normal mood and affect    ECHO:    STRESS:    CATH:  No results found for this or any previous visit.    BMP:     Glucose   Date Value Ref Range Status   03/31/2025 101 (H) 65 - 99 mg/dL Final     BUN   Date Value Ref Range Status   03/31/2025 41 (H) 8 - 23 mg/dL Final     Creatinine   Date Value Ref Range Status   03/31/2025 1.37 (H) 0.57 - 1.00 mg/dL Final     Sodium   Date Value Ref Range Status   03/31/2025 140 136 - 145 mmol/L Final     Potassium   Date Value Ref Range Status   03/31/2025 4.6 3.5 - 5.2 mmol/L Final     Chloride   Date Value Ref Range Status   03/31/2025 102 98 - 107 mmol/L Final     CO2   Date Value Ref Range Status   03/31/2025 26.4 22.0 - 29.0 mmol/L Final     Calcium   Date Value Ref Range Status   03/31/2025 9.7 8.6 - 10.5 mg/dL Final     BUN/Creatinine Ratio   Date Value Ref Range Status   03/31/2025 29.9 (H) 7.0 - 25.0 Final     Anion Gap   Date Value Ref Range Status   03/31/2025 11.6 5.0 - 15.0 mmol/L Final     eGFR   Date Value Ref Range Status   03/31/2025 40.6 (L) >60.0 mL/min/1.73 Final     LIPIDS:  Total Cholesterol   Date Value Ref Range " Status   03/31/2025 104 0 - 200 mg/dL Final     Triglycerides   Date Value Ref Range Status   03/31/2025 107 0 - 150 mg/dL Final     HDL Cholesterol   Date Value Ref Range Status   03/31/2025 32 (L) 40 - 60 mg/dL Final     LDL Cholesterol    Date Value Ref Range Status   03/31/2025 52 0 - 100 mg/dL Final     VLDL Cholesterol   Date Value Ref Range Status   03/31/2025 20 5 - 40 mg/dL Final     LDL/HDL Ratio   Date Value Ref Range Status   03/31/2025 1.58  Final         Procedures             ASSESSMENT:  Diagnoses and all orders for this visit:    1. Coronary artery disease involving native coronary artery of native heart without angina pectoris (Primary)    2. Mixed hyperlipidemia    3. Hypertension, essential    Other orders  -     Holter Monitor Scan  -     ECG Scan         PLAN:    1.  Agree with the aspirin.  2.  Blood pressure is under good control.  3.  Patient is only tolerating the Lipitor 40 daily.  This along with the Zetia has her cholesterol 1/2025 at LDL 61, HDL 43, and triglycerides 144.  I feel these are under fine control.  4.  Patient does not smoke.  5.  Reviewed patient's EKG from 3/25/2025 that shows normal sinus rhythm with no abnormalities.  6.  Patient is working on her A1c and is intentionally losing weight.  7.  Patient is active with no chest pain.  I do not think any further cardiac workup is warranted at this time.  We did talk about a regular exercise program along with the exercise bike as patient does have back issues.  She is going to call us if any exertional chest pain relieved with rest.  Her modifiable risk factors are under excellent control.  8.  Will see back in 6 months if she is doing well at that point we could consider spacing this out to 1 year.  I did tell her to call us if any exertional chest pain relieved with rest.      Return in about 6 months (around 11/13/2025).     Patient was given instructions and counseling regarding her condition or for health maintenance  advice. Please see specific information pulled into the AVS if appropriate.         Stevan Bhatia MD   5/13/2025  12:33 EDT

## 2025-05-22 RX ORDER — BLOOD SUGAR DIAGNOSTIC
1 STRIP MISCELLANEOUS 3 TIMES DAILY
Qty: 300 EACH | Refills: 1 | Status: SHIPPED | OUTPATIENT
Start: 2025-05-22

## 2025-06-10 ENCOUNTER — OFFICE VISIT (OUTPATIENT)
Dept: FAMILY MEDICINE CLINIC | Age: 75
End: 2025-06-10
Payer: MEDICARE

## 2025-06-10 VITALS
WEIGHT: 177.8 LBS | HEART RATE: 65 BPM | SYSTOLIC BLOOD PRESSURE: 117 MMHG | OXYGEN SATURATION: 96 % | HEIGHT: 62 IN | DIASTOLIC BLOOD PRESSURE: 47 MMHG | TEMPERATURE: 97.7 F | BODY MASS INDEX: 32.72 KG/M2

## 2025-06-10 DIAGNOSIS — E66.09 CLASS 1 OBESITY DUE TO EXCESS CALORIES WITH SERIOUS COMORBIDITY AND BODY MASS INDEX (BMI) OF 33.0 TO 33.9 IN ADULT: ICD-10-CM

## 2025-06-10 DIAGNOSIS — N18.32 STAGE 3B CHRONIC KIDNEY DISEASE: ICD-10-CM

## 2025-06-10 DIAGNOSIS — E11.22 TYPE 2 DIABETES MELLITUS WITH STAGE 3A CHRONIC KIDNEY DISEASE, WITH LONG-TERM CURRENT USE OF INSULIN: Primary | ICD-10-CM

## 2025-06-10 DIAGNOSIS — I25.10 CORONARY ARTERY DISEASE INVOLVING NATIVE CORONARY ARTERY OF NATIVE HEART WITHOUT ANGINA PECTORIS: ICD-10-CM

## 2025-06-10 DIAGNOSIS — N18.31 TYPE 2 DIABETES MELLITUS WITH STAGE 3A CHRONIC KIDNEY DISEASE, WITH LONG-TERM CURRENT USE OF INSULIN: Primary | ICD-10-CM

## 2025-06-10 DIAGNOSIS — E03.9 ACQUIRED HYPOTHYROIDISM: ICD-10-CM

## 2025-06-10 DIAGNOSIS — Z79.4 TYPE 2 DIABETES MELLITUS WITH STAGE 3A CHRONIC KIDNEY DISEASE, WITH LONG-TERM CURRENT USE OF INSULIN: Primary | ICD-10-CM

## 2025-06-10 DIAGNOSIS — E78.2 MIXED HYPERLIPIDEMIA: ICD-10-CM

## 2025-06-10 DIAGNOSIS — Z12.11 COLON CANCER SCREENING: ICD-10-CM

## 2025-06-10 DIAGNOSIS — I10 HYPERTENSION, ESSENTIAL: ICD-10-CM

## 2025-06-10 DIAGNOSIS — E66.811 CLASS 1 OBESITY DUE TO EXCESS CALORIES WITH SERIOUS COMORBIDITY AND BODY MASS INDEX (BMI) OF 33.0 TO 33.9 IN ADULT: ICD-10-CM

## 2025-06-10 RX ORDER — EZETIMIBE 10 MG/1
10 TABLET ORAL DAILY
Qty: 90 TABLET | Refills: 1 | Status: SHIPPED | OUTPATIENT
Start: 2025-06-10 | End: 2027-03-02

## 2025-06-10 NOTE — ASSESSMENT & PLAN NOTE
Blood pressure looks great continue current regimen    Orders:    Comprehensive Metabolic Panel    CBC Auto Differential

## 2025-06-10 NOTE — ASSESSMENT & PLAN NOTE
Not having symptoms of ischemic heart disease.  Follow risk factor reduction strategies which would include improved diet, weight loss, and regular exercise.

## 2025-06-10 NOTE — ASSESSMENT & PLAN NOTE
Lately her blood sugars have trended up some.  She has proven that if she adheres to a stricter diet blood sugars and weight both improved.  Encouraged her to redouble efforts at diet.  We discussed the option of using Mounjaro at the limited out-of-pocket expense expense $2000 per year which she may decide to hold off until first of the year to implement.  Check lab predicate further recommendations based on results    Orders:    Hemoglobin A1c

## 2025-06-10 NOTE — ASSESSMENT & PLAN NOTE
Patient's (Body mass index is 32.52 kg/m².) indicates that they are obese (BMI >30) with health conditions that include hypertension, diabetes mellitus, and dyslipidemias . Weight is unchanged. BMI  is above average; BMI management plan is completed. We discussed portion control and increasing exercise.

## 2025-06-10 NOTE — ASSESSMENT & PLAN NOTE
Lipids are okay continue current regimen    Orders:    ezetimibe (ZETIA) 10 MG tablet; Take 1 tablet by mouth Daily.    Lipid Panel    Comprehensive Metabolic Panel

## 2025-06-10 NOTE — ASSESSMENT & PLAN NOTE
Reviewed ultrasound results and the consequence of CKD.  Will repeat labs and further recommendations pending results

## 2025-06-10 NOTE — PROGRESS NOTES
Chief Complaint  Diabetes, Hyperlipidemia, Hypertension, and Hypothyroidism    Patient or patient representative verbalized consent for the use of Ambient Listening during the visit with  Jonnie Zuñiga MD for chart documentation. 6/10/2025  10:03 EDT    Subjective          Kelsy Rider presents to [facility]  History of Present Illness    History of Present Illness  The patient is a 75-year-old female who presents for evaluation of diabetes mellitus, decreased kidney function, and health maintenance.    She reports a period of stable blood glucose levels following her last visit in 03/2025, but has since experienced a decline in control. Her blood glucose levels have been fluctuating around the 200 range since the end of 05/2025. She has not initiated Mounjaro therapy due to financial constraints, despite being on Medicare. She was informed that the cost of Mounjaro would be $689 for the first month and $289 for subsequent months. She has received a letter indicating that she has met her deductible, but continues to pay out-of-pocket for her medications. The cost of her insulin is $35 per prescription, while her oral medications are less expensive. She is aware of the dietary modifications necessary to manage her diabetes, but struggles with consistent implementation. She has lost weight, as reported by her cardiologist, Dr. Shoemaker, but believes she may have regained some of it. She is seeking a refill of her NovoLog prescription, as she only receives four pens per refill. She does not typically experience swelling.    She underwent a kidney ultrasound, which yielded normal results. She occasionally uses ibuprofen, but does not take Tylenol. She is curious about the potential impact of neuropathy on her kidney function.    She is due for a colonoscopy in 10/2025 and has expressed a preference for Dr. Bishop to perform the procedure.            Current Outpatient Medications  on File Prior to Visit   Medication Sig Dispense Refill    Accu-Chek Gricelda Plus test strip USE TO TEST BLOOD SUGAR THREE TIMES A  each 1    alendronate (FOSAMAX) 70 MG tablet TAKE 1 TABLET BY MOUTH ONCE WEEKLY ON AN EMPTY STOMACH BEFORE BREAKFAST. REMAIN UPRIGHT FOR 30 MINUTES AND TAKE WITH 8 OUNCES OF WATER 12 tablet 1    aspirin 81 MG chewable tablet Chew 1 tablet Daily.      atenolol-chlorthalidone (TENORETIC) 50-25 MG per tablet TAKE 1/2 TABLET BY MOUTH TWICE A DAY 90 tablet 1    atorvastatin (LIPITOR) 80 MG tablet Take 0.5 tablets by mouth Daily. 1 tablet 0    BD Pen Needle Ita U/F 32G X 4 MM misc USE AS DIRECTED WITH VICTOZA, LANTUS AND NOVOLOG 200 each 3    Calcium Carbonate-Vitamin D 600-200 MG-UNIT capsule Calcium 600 + D(3) 600 mg calcium- 200 unit oral capsule take 1 capsule by oral route daily   Active      coenzyme Q10 100 MG capsule Co Q-10 100 mg oral capsule take 1 capsule by oral route daily   Active      famotidine (PEPCID) 20 MG tablet Take 1 tablet by mouth Daily.      insulin aspart (NovoLOG FlexPen) 100 UNIT/ML solution pen-injector sc pen INJECT 4 UNITS UNDER THE SKIN EVERY MORNING, THEN 14 UNITS WITH NOON MEAL, THEN 6 UNITS WITH EVENING MEAL ALONG WITH SLIDING SCALE MAX 50 UNITS DAILY 12 mL 2    Insulin Glargine (Lantus SoloStar) 100 UNIT/ML injection pen INJECT 66 UNITS UNDER THE SKIN DAILY 18 mL 3    levothyroxine (SYNTHROID, LEVOTHROID) 112 MCG tablet TAKE 1 TABLET BY MOUTH DAILY 90 tablet 0    lisinopril (PRINIVIL,ZESTRIL) 40 MG tablet TAKE 1 TABLET BY MOUTH DAILY 90 tablet 1    metFORMIN (GLUCOPHAGE) 850 MG tablet TAKE 1 TABLET BY MOUTH DAILY 90 tablet 1    pregabalin (LYRICA) 75 MG capsule TAKE 1 CAPSULE BY MOUTH 3 TIMES A DAY 90 capsule 2    [DISCONTINUED] ezetimibe (ZETIA) 10 MG tablet Take 1 tablet by mouth Daily.      meclizine (ANTIVERT) 25 MG tablet Take 1 tablet by mouth 3 (Three) Times a Day As Needed for Dizziness. 30 tablet 0    Tirzepatide 2.5 MG/0.5ML solution  "auto-injector Inject 2.5 mg under the skin into the appropriate area as directed 1 (One) Time Per Week. 2 mL 0     No current facility-administered medications on file prior to visit.       Review of Systems     Social History     Tobacco Use   Smoking Status Never   Smokeless Tobacco Never        Objective   Vital Signs:   /47 (BP Location: Right arm, Patient Position: Sitting)   Pulse 65   Temp 97.7 °F (36.5 °C) (Temporal)   Ht 157.5 cm (62\")   Wt 80.6 kg (177 lb 12.8 oz)   SpO2 96%   BMI 32.52 kg/m²   BP Readings from Last 3 Encounters:   06/10/25 117/47   05/13/25 128/88   03/31/25 114/73      Physical Exam  Constitutional:       Appearance: Normal appearance. She is obese.   HENT:      Right Ear: Tympanic membrane normal.      Left Ear: Tympanic membrane normal.      Mouth/Throat:      Pharynx: No oropharyngeal exudate or posterior oropharyngeal erythema.   Eyes:      General: No scleral icterus.  Neck:      Vascular: No carotid bruit.   Cardiovascular:      Rate and Rhythm: Normal rate and regular rhythm.      Heart sounds: Normal heart sounds. No murmur heard.  Pulmonary:      Effort: No respiratory distress.      Breath sounds: No wheezing or rales.   Musculoskeletal:      Right lower leg: No edema.      Left lower leg: No edema.   Lymphadenopathy:      Cervical: No cervical adenopathy.   Neurological:      General: No focal deficit present.      Mental Status: She is alert.   Psychiatric:         Attention and Perception: Attention normal.         Mood and Affect: Mood normal.         Speech: Speech normal.          Physical Exam        Result Review :       Results  Labs   - Blood sugar levels: 200 range   - Kidney function test: Slight drop off    Imaging   - Ultrasound of the kidneys: Normal                  Assessment and Plan    Assessment & Plan  Type 2 diabetes mellitus with stage 3a chronic kidney disease, with long-term current use of insulin  Lately her blood sugars have trended up " some.  She has proven that if she adheres to a stricter diet blood sugars and weight both improved.  Encouraged her to redouble efforts at diet.  We discussed the option of using Mounjaro at the limited out-of-pocket expense expense $2000 per year which she may decide to hold off until first of the year to implement.  Check lab predicate further recommendations based on results    Orders:    Hemoglobin A1c    Hypertension, essential  Blood pressure looks great continue current regimen    Orders:    Comprehensive Metabolic Panel    CBC Auto Differential    Mixed hyperlipidemia  Lipids are okay continue current regimen    Orders:    ezetimibe (ZETIA) 10 MG tablet; Take 1 tablet by mouth Daily.    Lipid Panel    Comprehensive Metabolic Panel    Acquired hypothyroidism  Check lab predicate change based on results  Orders:    TSH Rfx On Abnormal To Free T4    Class 1 obesity due to excess calories with serious comorbidity and body mass index (BMI) of 33.0 to 33.9 in adult  Patient's (Body mass index is 32.52 kg/m².) indicates that they are obese (BMI >30) with health conditions that include hypertension, diabetes mellitus, and dyslipidemias . Weight is unchanged. BMI  is above average; BMI management plan is completed. We discussed portion control and increasing exercise.          Coronary artery disease involving native coronary artery of native heart without angina pectoris  Not having symptoms of ischemic heart disease.  Follow risk factor reduction strategies which would include improved diet, weight loss, and regular exercise.         Colon cancer screening  Will be due for colonoscopy October this year  Orders:    Ambulatory Referral For Screening Colonoscopy    Stage 3b chronic kidney disease  Reviewed ultrasound results and the consequence of CKD.  Will repeat labs and further recommendations pending results           Assessment & Plan  1. Diabetes Mellitus.  Her blood glucose levels have been fluctuating around  the 200 range since the end of 05/2025. She has not initiated Mounjaro therapy due to financial constraints, despite being on Medicare.  Increased pain and limited mobility.  She is currently on NovoLog insulin and has been advised to inform her pharmacy that her prescription allows for up to 50 units daily, which equates to five pens per month.  A prescription refill for NovoLog will be sent to the pharmacy. She is encouraged to consider the use of Mounjaro, given its potential benefits in managing her condition. An A1c test will be conducted today. If her blood glucose levels continue to be unstable, she should return sooner.    2. Decreased kidney function.  Her kidney function has shown a slight decrease, potentially due to factors such as blood pressure, diabetes, or certain medications like anti-inflammatory drugs.  Occasionally takes ibuprofen, which should not be a problem.  A repeat lab test will be conducted today to monitor her kidney function.    3. Health Maintenance.  She is due for a colonoscopy in 10/2025.  A referral will be made to Dr. Bishop for this procedure.    Follow-up  The patient will follow up in 6 months.        Follow Up   No follow-ups on file.  Patient was given instructions and counseling regarding her condition or for health maintenance advice. Please see specific information pulled into the AVS if appropriate.

## 2025-06-18 DIAGNOSIS — Z79.899 HIGH RISK MEDICATION USE: ICD-10-CM

## 2025-06-18 DIAGNOSIS — M54.17 LUMBOSACRAL RADICULOPATHY: ICD-10-CM

## 2025-06-18 RX ORDER — PREGABALIN 75 MG/1
75 CAPSULE ORAL 3 TIMES DAILY
Qty: 90 CAPSULE | Refills: 1 | Status: SHIPPED | OUTPATIENT
Start: 2025-06-18

## 2025-06-19 ENCOUNTER — TELEPHONE (OUTPATIENT)
Dept: CARDIOLOGY | Facility: CLINIC | Age: 75
End: 2025-06-19
Payer: MEDICARE

## 2025-06-19 NOTE — TELEPHONE ENCOUNTER
Procedure: Colonoscopy and/or EGD     Med Directive: NA     PMH: CAD stent 2011, HTN, HLD     Last Seen: 5/13/2025

## 2025-06-22 DIAGNOSIS — E03.9 ACQUIRED HYPOTHYROIDISM: ICD-10-CM

## 2025-06-23 DIAGNOSIS — E11.22 TYPE 2 DIABETES MELLITUS WITH STAGE 3A CHRONIC KIDNEY DISEASE, WITH LONG-TERM CURRENT USE OF INSULIN: ICD-10-CM

## 2025-06-23 DIAGNOSIS — Z79.4 TYPE 2 DIABETES MELLITUS WITH STAGE 3A CHRONIC KIDNEY DISEASE, WITH LONG-TERM CURRENT USE OF INSULIN: ICD-10-CM

## 2025-06-23 DIAGNOSIS — N18.31 TYPE 2 DIABETES MELLITUS WITH STAGE 3A CHRONIC KIDNEY DISEASE, WITH LONG-TERM CURRENT USE OF INSULIN: ICD-10-CM

## 2025-06-24 RX ORDER — LEVOTHYROXINE SODIUM 112 UG/1
112 TABLET ORAL DAILY
Qty: 90 TABLET | Refills: 1 | Status: SHIPPED | OUTPATIENT
Start: 2025-06-24

## 2025-06-24 RX ORDER — INSULIN GLARGINE 100 [IU]/ML
66 INJECTION, SOLUTION SUBCUTANEOUS DAILY
Qty: 18 ML | Refills: 5 | Status: SHIPPED | OUTPATIENT
Start: 2025-06-24

## 2025-07-01 DIAGNOSIS — E11.22 TYPE 2 DIABETES MELLITUS WITH STAGE 3A CHRONIC KIDNEY DISEASE, WITH LONG-TERM CURRENT USE OF INSULIN: ICD-10-CM

## 2025-07-01 DIAGNOSIS — Z79.4 TYPE 2 DIABETES MELLITUS WITH STAGE 3A CHRONIC KIDNEY DISEASE, WITH LONG-TERM CURRENT USE OF INSULIN: ICD-10-CM

## 2025-07-01 DIAGNOSIS — N18.31 TYPE 2 DIABETES MELLITUS WITH STAGE 3A CHRONIC KIDNEY DISEASE, WITH LONG-TERM CURRENT USE OF INSULIN: ICD-10-CM

## 2025-07-01 RX ORDER — INSULIN ASPART 100 [IU]/ML
INJECTION, SOLUTION INTRAVENOUS; SUBCUTANEOUS
Qty: 12 ML | Refills: 2 | Status: SHIPPED | OUTPATIENT
Start: 2025-07-01

## 2025-07-02 ENCOUNTER — LAB (OUTPATIENT)
Dept: LAB | Facility: HOSPITAL | Age: 75
End: 2025-07-02
Payer: MEDICARE

## 2025-07-02 LAB
ALBUMIN SERPL-MCNC: 3.9 G/DL (ref 3.5–5.2)
ALBUMIN/GLOB SERPL: 1.3 G/DL
ALP SERPL-CCNC: 62 U/L (ref 39–117)
ALT SERPL W P-5'-P-CCNC: 17 U/L (ref 1–33)
ANION GAP SERPL CALCULATED.3IONS-SCNC: 9.9 MMOL/L (ref 5–15)
AST SERPL-CCNC: 22 U/L (ref 1–32)
BASOPHILS # BLD AUTO: 0.06 10*3/MM3 (ref 0–0.2)
BASOPHILS NFR BLD AUTO: 0.9 % (ref 0–1.5)
BILIRUB SERPL-MCNC: 0.3 MG/DL (ref 0–1.2)
BUN SERPL-MCNC: 24 MG/DL (ref 8–23)
BUN/CREAT SERPL: 22.6 (ref 7–25)
CALCIUM SPEC-SCNC: 9.1 MG/DL (ref 8.6–10.5)
CHLORIDE SERPL-SCNC: 106 MMOL/L (ref 98–107)
CHOLEST SERPL-MCNC: 115 MG/DL (ref 0–200)
CO2 SERPL-SCNC: 26.1 MMOL/L (ref 22–29)
CREAT SERPL-MCNC: 1.06 MG/DL (ref 0.57–1)
DEPRECATED RDW RBC AUTO: 47.2 FL (ref 37–54)
EGFRCR SERPLBLD CKD-EPI 2021: 55.2 ML/MIN/1.73
EOSINOPHIL # BLD AUTO: 0.49 10*3/MM3 (ref 0–0.4)
EOSINOPHIL NFR BLD AUTO: 7 % (ref 0.3–6.2)
ERYTHROCYTE [DISTWIDTH] IN BLOOD BY AUTOMATED COUNT: 14.1 % (ref 12.3–15.4)
GLOBULIN UR ELPH-MCNC: 2.9 GM/DL
GLUCOSE SERPL-MCNC: 115 MG/DL (ref 65–99)
HBA1C MFR BLD: 6.7 % (ref 4.8–5.6)
HCT VFR BLD AUTO: 38.1 % (ref 34–46.6)
HDLC SERPL-MCNC: 33 MG/DL (ref 40–60)
HGB BLD-MCNC: 12.4 G/DL (ref 12–15.9)
IMM GRANULOCYTES # BLD AUTO: 0.01 10*3/MM3 (ref 0–0.05)
IMM GRANULOCYTES NFR BLD AUTO: 0.1 % (ref 0–0.5)
LDLC SERPL CALC-MCNC: 42 MG/DL (ref 0–100)
LDLC/HDLC SERPL: 0.93 {RATIO}
LYMPHOCYTES # BLD AUTO: 1.94 10*3/MM3 (ref 0.7–3.1)
LYMPHOCYTES NFR BLD AUTO: 27.5 % (ref 19.6–45.3)
MCH RBC QN AUTO: 29.7 PG (ref 26.6–33)
MCHC RBC AUTO-ENTMCNC: 32.5 G/DL (ref 31.5–35.7)
MCV RBC AUTO: 91.1 FL (ref 79–97)
MONOCYTES # BLD AUTO: 0.77 10*3/MM3 (ref 0.1–0.9)
MONOCYTES NFR BLD AUTO: 10.9 % (ref 5–12)
NEUTROPHILS NFR BLD AUTO: 3.78 10*3/MM3 (ref 1.7–7)
NEUTROPHILS NFR BLD AUTO: 53.6 % (ref 42.7–76)
PLATELET # BLD AUTO: 194 10*3/MM3 (ref 140–450)
PMV BLD AUTO: 12.2 FL (ref 6–12)
POTASSIUM SERPL-SCNC: 4 MMOL/L (ref 3.5–5.2)
PROT SERPL-MCNC: 6.8 G/DL (ref 6–8.5)
RBC # BLD AUTO: 4.18 10*6/MM3 (ref 3.77–5.28)
SODIUM SERPL-SCNC: 142 MMOL/L (ref 136–145)
TRIGL SERPL-MCNC: 256 MG/DL (ref 0–150)
TSH SERPL DL<=0.05 MIU/L-ACNC: 0.53 UIU/ML (ref 0.27–4.2)
VLDLC SERPL-MCNC: 40 MG/DL (ref 5–40)
WBC NRBC COR # BLD AUTO: 7.05 10*3/MM3 (ref 3.4–10.8)

## 2025-07-02 PROCEDURE — 80061 LIPID PANEL: CPT | Performed by: FAMILY MEDICINE

## 2025-07-02 PROCEDURE — 80053 COMPREHEN METABOLIC PANEL: CPT | Performed by: FAMILY MEDICINE

## 2025-07-02 PROCEDURE — 84443 ASSAY THYROID STIM HORMONE: CPT | Performed by: FAMILY MEDICINE

## 2025-07-02 PROCEDURE — 85025 COMPLETE CBC W/AUTO DIFF WBC: CPT | Performed by: FAMILY MEDICINE

## 2025-07-02 PROCEDURE — 83036 HEMOGLOBIN GLYCOSYLATED A1C: CPT | Performed by: FAMILY MEDICINE

## 2025-08-11 DIAGNOSIS — I10 ESSENTIAL HYPERTENSION: ICD-10-CM

## 2025-08-11 RX ORDER — LISINOPRIL 40 MG/1
40 TABLET ORAL DAILY
Qty: 90 TABLET | Refills: 1 | Status: SHIPPED | OUTPATIENT
Start: 2025-08-11

## 2025-08-11 RX ORDER — ATENOLOL AND CHLORTHALIDONE TABLET 50; 25 MG/1; MG/1
0.5 TABLET ORAL EVERY 12 HOURS SCHEDULED
Qty: 90 TABLET | Refills: 1 | Status: SHIPPED | OUTPATIENT
Start: 2025-08-11

## 2025-08-19 DIAGNOSIS — Z79.899 HIGH RISK MEDICATION USE: ICD-10-CM

## 2025-08-19 DIAGNOSIS — M54.17 LUMBOSACRAL RADICULOPATHY: ICD-10-CM

## 2025-08-21 RX ORDER — PREGABALIN 75 MG/1
75 CAPSULE ORAL 3 TIMES DAILY
Qty: 90 CAPSULE | Refills: 1 | Status: SHIPPED | OUTPATIENT
Start: 2025-08-21